# Patient Record
Sex: FEMALE | Race: BLACK OR AFRICAN AMERICAN | NOT HISPANIC OR LATINO | URBAN - METROPOLITAN AREA
[De-identification: names, ages, dates, MRNs, and addresses within clinical notes are randomized per-mention and may not be internally consistent; named-entity substitution may affect disease eponyms.]

---

## 2017-02-06 ENCOUNTER — OUTPATIENT (OUTPATIENT)
Dept: OUTPATIENT SERVICES | Facility: HOSPITAL | Age: 67
LOS: 1 days | End: 2017-02-06
Payer: COMMERCIAL

## 2017-02-06 DIAGNOSIS — Z01.810 ENCOUNTER FOR PREPROCEDURAL CARDIOVASCULAR EXAMINATION: ICD-10-CM

## 2017-02-06 PROCEDURE — 78452 HT MUSCLE IMAGE SPECT MULT: CPT | Mod: 26

## 2017-02-06 PROCEDURE — 78452 HT MUSCLE IMAGE SPECT MULT: CPT

## 2017-02-06 PROCEDURE — 93017 CV STRESS TEST TRACING ONLY: CPT

## 2017-02-06 PROCEDURE — A9500: CPT

## 2017-02-06 PROCEDURE — 93018 CV STRESS TEST I&R ONLY: CPT

## 2017-02-06 PROCEDURE — 93016 CV STRESS TEST SUPVJ ONLY: CPT

## 2017-02-07 DIAGNOSIS — R07.9 CHEST PAIN, UNSPECIFIED: ICD-10-CM

## 2017-04-27 ENCOUNTER — RESULT REVIEW (OUTPATIENT)
Age: 67
End: 2017-04-27

## 2017-12-26 ENCOUNTER — APPOINTMENT (OUTPATIENT)
Dept: PULMONOLOGY | Facility: CLINIC | Age: 67
End: 2017-12-26

## 2018-07-31 ENCOUNTER — RESULT REVIEW (OUTPATIENT)
Age: 68
End: 2018-07-31

## 2018-08-28 ENCOUNTER — RESULT REVIEW (OUTPATIENT)
Age: 68
End: 2018-08-28

## 2019-02-04 ENCOUNTER — RESULT REVIEW (OUTPATIENT)
Age: 69
End: 2019-02-04

## 2019-09-30 ENCOUNTER — RESULT REVIEW (OUTPATIENT)
Age: 69
End: 2019-09-30

## 2019-11-20 ENCOUNTER — OUTPATIENT (OUTPATIENT)
Dept: OUTPATIENT SERVICES | Facility: HOSPITAL | Age: 69
LOS: 1 days | End: 2019-11-20
Payer: COMMERCIAL

## 2019-11-20 DIAGNOSIS — I35.0 NONRHEUMATIC AORTIC (VALVE) STENOSIS: ICD-10-CM

## 2019-11-20 DIAGNOSIS — Z01.810 ENCOUNTER FOR PREPROCEDURAL CARDIOVASCULAR EXAMINATION: ICD-10-CM

## 2019-11-20 DIAGNOSIS — R06.09 OTHER FORMS OF DYSPNEA: ICD-10-CM

## 2019-11-20 PROCEDURE — 78452 HT MUSCLE IMAGE SPECT MULT: CPT | Mod: 26

## 2019-11-20 PROCEDURE — 78452 HT MUSCLE IMAGE SPECT MULT: CPT

## 2019-11-20 PROCEDURE — 93017 CV STRESS TEST TRACING ONLY: CPT

## 2019-11-20 PROCEDURE — A9500: CPT

## 2019-11-20 PROCEDURE — 93016 CV STRESS TEST SUPVJ ONLY: CPT

## 2019-11-20 PROCEDURE — 93018 CV STRESS TEST I&R ONLY: CPT

## 2020-01-21 ENCOUNTER — OUTPATIENT (OUTPATIENT)
Dept: OUTPATIENT SERVICES | Facility: HOSPITAL | Age: 70
LOS: 1 days | End: 2020-01-21
Payer: COMMERCIAL

## 2020-01-21 VITALS
SYSTOLIC BLOOD PRESSURE: 118 MMHG | WEIGHT: 212.97 LBS | OXYGEN SATURATION: 99 % | RESPIRATION RATE: 16 BRPM | HEIGHT: 65 IN | TEMPERATURE: 98 F | DIASTOLIC BLOOD PRESSURE: 68 MMHG | HEART RATE: 77 BPM

## 2020-01-21 DIAGNOSIS — I25.10 ATHEROSCLEROTIC HEART DISEASE OF NATIVE CORONARY ARTERY WITHOUT ANGINA PECTORIS: ICD-10-CM

## 2020-01-21 DIAGNOSIS — Z87.898 PERSONAL HISTORY OF OTHER SPECIFIED CONDITIONS: ICD-10-CM

## 2020-01-21 DIAGNOSIS — R42 DIZZINESS AND GIDDINESS: ICD-10-CM

## 2020-01-21 DIAGNOSIS — N95.0 POSTMENOPAUSAL BLEEDING: ICD-10-CM

## 2020-01-21 DIAGNOSIS — Z95.1 PRESENCE OF AORTOCORONARY BYPASS GRAFT: Chronic | ICD-10-CM

## 2020-01-21 DIAGNOSIS — E11.9 TYPE 2 DIABETES MELLITUS WITHOUT COMPLICATIONS: ICD-10-CM

## 2020-01-21 DIAGNOSIS — Z91.013 ALLERGY TO SEAFOOD: ICD-10-CM

## 2020-01-21 DIAGNOSIS — Z87.81 PERSONAL HISTORY OF (HEALED) TRAUMATIC FRACTURE: Chronic | ICD-10-CM

## 2020-01-21 LAB
ANION GAP SERPL CALC-SCNC: 17 MMO/L — HIGH (ref 7–14)
BUN SERPL-MCNC: 21 MG/DL — SIGNIFICANT CHANGE UP (ref 7–23)
CALCIUM SERPL-MCNC: 10.3 MG/DL — SIGNIFICANT CHANGE UP (ref 8.4–10.5)
CHLORIDE SERPL-SCNC: 95 MMOL/L — LOW (ref 98–107)
CO2 SERPL-SCNC: 26 MMOL/L — SIGNIFICANT CHANGE UP (ref 22–31)
CREAT SERPL-MCNC: 0.9 MG/DL — SIGNIFICANT CHANGE UP (ref 0.5–1.3)
GLUCOSE SERPL-MCNC: 137 MG/DL — HIGH (ref 70–99)
HBA1C BLD-MCNC: 7.3 % — HIGH (ref 4–5.6)
HCT VFR BLD CALC: 38.8 % — SIGNIFICANT CHANGE UP (ref 34.5–45)
HGB BLD-MCNC: 12.3 G/DL — SIGNIFICANT CHANGE UP (ref 11.5–15.5)
MCHC RBC-ENTMCNC: 29.4 PG — SIGNIFICANT CHANGE UP (ref 27–34)
MCHC RBC-ENTMCNC: 31.7 % — LOW (ref 32–36)
MCV RBC AUTO: 92.8 FL — SIGNIFICANT CHANGE UP (ref 80–100)
NRBC # FLD: 0 K/UL — SIGNIFICANT CHANGE UP (ref 0–0)
PLATELET # BLD AUTO: 334 K/UL — SIGNIFICANT CHANGE UP (ref 150–400)
PMV BLD: 9.7 FL — SIGNIFICANT CHANGE UP (ref 7–13)
POTASSIUM SERPL-MCNC: 3.5 MMOL/L — SIGNIFICANT CHANGE UP (ref 3.5–5.3)
POTASSIUM SERPL-SCNC: 3.5 MMOL/L — SIGNIFICANT CHANGE UP (ref 3.5–5.3)
RBC # BLD: 4.18 M/UL — SIGNIFICANT CHANGE UP (ref 3.8–5.2)
RBC # FLD: 14.2 % — SIGNIFICANT CHANGE UP (ref 10.3–14.5)
SODIUM SERPL-SCNC: 138 MMOL/L — SIGNIFICANT CHANGE UP (ref 135–145)
WBC # BLD: 5.46 K/UL — SIGNIFICANT CHANGE UP (ref 3.8–10.5)
WBC # FLD AUTO: 5.46 K/UL — SIGNIFICANT CHANGE UP (ref 3.8–10.5)

## 2020-01-21 PROCEDURE — 93010 ELECTROCARDIOGRAM REPORT: CPT

## 2020-01-21 RX ORDER — SODIUM CHLORIDE 9 MG/ML
1000 INJECTION, SOLUTION INTRAVENOUS
Refills: 0 | Status: DISCONTINUED | OUTPATIENT
Start: 2020-01-31 | End: 2020-02-17

## 2020-01-21 RX ORDER — UBIDECARENONE 100 MG
1 CAPSULE ORAL
Qty: 0 | Refills: 0 | DISCHARGE

## 2020-01-21 NOTE — H&P PST ADULT - NSICDXPASTMEDICALHX_GEN_ALL_CORE_FT
PAST MEDICAL HISTORY:  Breast Ca (L)    Colon Cancer     Coronary artery disease     HTN (Hypertension)     Hyperlipidemia PAST MEDICAL HISTORY:  Breast Ca (L)    Colon Cancer     Coronary artery disease     Diabetes mellitus     HTN (Hypertension)     Hyperlipidemia     Renal insufficiency per pt    Vertigo

## 2020-01-21 NOTE — H&P PST ADULT - NEUROLOGICAL COMMENTS
Pt  with hx of vertigo dx several ago.  Pt reports episodes of  dizziness started  Dec 2019. Started on Meclizine without improvement, No longer taking.  Per pt, she feels the dizziness when changing positions quickly.  Pt to see PMD for re-eval. Pt  with hx of vertigo dx several ago.  Pt reports recent episodes of  dizziness started  Dec 2019. Started on Meclizine without improvement, No longer taking.  Per pt, she feels the dizziness when changing positions quickly.  Pt to see PMD for re-eval.

## 2020-01-21 NOTE — H&P PST ADULT - NSICDXPROBLEM_GEN_ALL_CORE_FT
PROBLEM DIAGNOSES  Problem: Postmenopausal bleeding  Assessment and Plan: Dilation Curettage Hysteroscopy 1/31/20.   CBC BMP Hgba1C EKG  pre-op instructions given and explained.  MASON precautions, OR booking informed    Problem: Shellfish allergy  Assessment and Plan: OR booking informed    Problem: Diabetes mellitus  Assessment and Plan: Pt advised not to take Metformin night pripor to surgery  or  morning of surgery, accuchek on admission    Problem: Episode of dizziness  Assessment and Plan: Pt with recent episdoes of dizziness, past hx of vertigo.  Pt to see PMD for pre-op eval, requsetd on chart    Problem: Coronary artery disease  Assessment and Plan: Most recent Card note , echo and stress reports from 11/2019 requested on chart.  Pt continues on low dose ASA

## 2020-01-21 NOTE — H&P PST ADULT - NSICDXPASTSURGICALHX_GEN_ALL_CORE_FT
PAST SURGICAL HISTORY:  H/O forearm fracture 1995 (R)    History of Colon Resection 2001    S/P Breast Lumpectomy (L) 1999    S/P CABG x 2 2010

## 2020-01-21 NOTE — H&P PST ADULT - GENITOURINARY COMMENTS
hx of intermittent vaginal bleeding which started 2019.  Dx with uterine polyp on imaging.  Scheduled for Dilation Curettage Hysteroscopy 1/31/20.

## 2020-01-21 NOTE — H&P PST ADULT - NEGATIVE NEUROLOGICAL SYMPTOMS
no confusion/no weakness/no generalized seizures/no headache/no facial palsy/no hemiparesis/no syncope/no transient paralysis/no loss of consciousness/no paresthesias/no loss of sensation/no difficulty walking/no focal seizures/no tremors

## 2020-01-21 NOTE — H&P PST ADULT - ASSESSMENT
70 y/o female with hx of intermittent vaginal bleeding which started 2019.  Dx with uterine polyp on imaging.  Scheduled for Dilation Curettage Hysteroscopy 1/31/20.

## 2020-01-30 ENCOUNTER — TRANSCRIPTION ENCOUNTER (OUTPATIENT)
Age: 70
End: 2020-01-30

## 2020-01-30 NOTE — ASU PATIENT PROFILE, ADULT - PSH
H/O forearm fracture  1995 (R)  History of Colon Resection  2001  S/P Breast Lumpectomy  (L) 1999  S/P CABG x 2  2010

## 2020-01-30 NOTE — ASU PATIENT PROFILE, ADULT - HARM RISK FACTORS
[facility]– [place] 2[place]         Critical Care Progress Note    Patient: Domo Espinal Date of Service: 5/5/2019   male, 81 year old  Admit Date: 5/3/2019   Attending: Bobby Parsons MD      Patient Description:  81 year old year old male admitted with SDH BLEED    Principal Problem:    SDH (subdural hematoma) (CMS/HCC)  Active Problems:    SAH (subarachnoid hemorrhage) (CMS/HCC)    CAD (coronary artery disease)    HTN (hypertension)    Atrial fibrillation (CMS/HCC)  Resolved Problems:    * No resolved hospital problems. *       ICU Diagnosis  A.fib on X[place]to, fall, SAH/SDH    24 HOUR Summary:  No change in neuro exam: intermittent R UE weakness/stiffness. Plan repeat CT brain w/o contrast tomorrow AM. Neurosurgery on the case. PT/OT on the case. Speech therapy and inpt rehab consults pending. Continue to hold anticoagolation. Pt has suspected MARGARETH, did not tolerate CPAP, required O2 4 lpm while asleep but on RA during the day. CXR: nl.      PHYSICAL EXAM    Vital 24 Hour Range Most Recent Value   Temperature Temp  Min: 97.6 °F (36.4 °C)  Max: 98.6 °F (37 °C) 97.6 °F (36.4 °C)   Pulse Pulse  Min: 51  Max: 77 64   Respiratory Resp  Min: 9  Max: 30 18   Blood Pressure BP  Min: 109/57  Max: 157/65 123/78   Pulse Oximetry SpO2  Min: 85 %  Max: 100 % 96 %   Art. BP No data recorded     O2 O2 Flow Rate (L/min)  Avg: 3.6 L/min  Min: 2 L/min   Min taken time: 05/05/19 1400  Max: 5 L/min   Max taken time: 05/05/19 0140       Vital Most Recent Value First Value   Weight 87.3 kg Weight: 88 kg   Height 5' 9\" (175.3 cm) Height: 5' 9\" (175.3 cm)   BMI 28.42 N/A     Examination:  Appearence: 81 year old year old male who appears   Neurologic:  Alert and oriented x 3  Lungs:  Clear to auscultation bilaterally  Heart:  Regular rate and rhythm and S1, S2 present  Abdomen: Soft  Extremities: Edema absent      Plan:  Close neuro monitoring  CT brain w/o contrast in AM  [facility] for seizure prophylaxis  PT/OT  Inpatient  rehab consult  Speech consult  Hold anticoagulation  Neurosurgery on the case    I have personally examined the patient and reviewed all pertinent data. Critical care time was 35 minutes.  This does not include time spent in procedures and teaching.  The patient's cares and treatment plan were discussed with Patient/Family and Nursing.    Jaqui Hamilton MD  5/5/2019  2:33 PM           no

## 2020-01-31 ENCOUNTER — RESULT REVIEW (OUTPATIENT)
Age: 70
End: 2020-01-31

## 2020-01-31 ENCOUNTER — OUTPATIENT (OUTPATIENT)
Dept: OUTPATIENT SERVICES | Facility: HOSPITAL | Age: 70
LOS: 1 days | Discharge: HOME | End: 2020-01-31
Payer: COMMERCIAL

## 2020-01-31 VITALS
DIASTOLIC BLOOD PRESSURE: 88 MMHG | TEMPERATURE: 98 F | OXYGEN SATURATION: 95 % | HEART RATE: 63 BPM | SYSTOLIC BLOOD PRESSURE: 128 MMHG | WEIGHT: 212.97 LBS | HEIGHT: 65 IN | RESPIRATION RATE: 16 BRPM

## 2020-01-31 VITALS
OXYGEN SATURATION: 99 % | RESPIRATION RATE: 16 BRPM | HEART RATE: 82 BPM | SYSTOLIC BLOOD PRESSURE: 118 MMHG | DIASTOLIC BLOOD PRESSURE: 65 MMHG

## 2020-01-31 DIAGNOSIS — Z87.81 PERSONAL HISTORY OF (HEALED) TRAUMATIC FRACTURE: Chronic | ICD-10-CM

## 2020-01-31 DIAGNOSIS — N95.0 POSTMENOPAUSAL BLEEDING: ICD-10-CM

## 2020-01-31 DIAGNOSIS — Z95.1 PRESENCE OF AORTOCORONARY BYPASS GRAFT: Chronic | ICD-10-CM

## 2020-01-31 LAB — GLUCOSE BLDC GLUCOMTR-MCNC: 156 MG/DL — HIGH (ref 70–99)

## 2020-01-31 PROCEDURE — 88305 TISSUE EXAM BY PATHOLOGIST: CPT | Mod: 26

## 2020-01-31 RX ORDER — METOPROLOL TARTRATE 50 MG
1 TABLET ORAL
Qty: 0 | Refills: 0 | DISCHARGE

## 2020-01-31 RX ORDER — ASCORBIC ACID 60 MG
1 TABLET,CHEWABLE ORAL
Qty: 0 | Refills: 0 | DISCHARGE

## 2020-01-31 RX ORDER — NIFEDIPINE 30 MG
1 TABLET, EXTENDED RELEASE 24 HR ORAL
Qty: 0 | Refills: 0 | DISCHARGE

## 2020-01-31 RX ORDER — ASPIRIN/CALCIUM CARB/MAGNESIUM 324 MG
1 TABLET ORAL
Qty: 0 | Refills: 0 | DISCHARGE

## 2020-01-31 RX ORDER — METFORMIN HYDROCHLORIDE 850 MG/1
1 TABLET ORAL
Qty: 0 | Refills: 0 | DISCHARGE

## 2020-01-31 RX ORDER — LISINOPRIL/HYDROCHLOROTHIAZIDE 10-12.5 MG
1 TABLET ORAL
Qty: 0 | Refills: 0 | DISCHARGE

## 2020-01-31 RX ORDER — SODIUM CHLORIDE 9 MG/ML
1000 INJECTION, SOLUTION INTRAVENOUS
Refills: 0 | Status: DISCONTINUED | OUTPATIENT
Start: 2020-01-31 | End: 2020-02-17

## 2020-01-31 RX ORDER — EZETIMIBE 10 MG/1
1 TABLET ORAL
Qty: 0 | Refills: 0 | DISCHARGE

## 2020-01-31 RX ORDER — UBIDECARENONE 100 MG
1 CAPSULE ORAL
Qty: 0 | Refills: 0 | DISCHARGE

## 2020-01-31 NOTE — ASU DISCHARGE PLAN (ADULT/PEDIATRIC) - FOLLOW UP APPOINTMENTS
911 or go to the nearest Emergency Room may also call Recovery Room (PACU) 24/7 @ [phone]/[facility]:

## 2020-01-31 NOTE — ASU DISCHARGE PLAN (ADULT/PEDIATRIC) - MEDICATION INSTRUCTIONS
You received IV Tylenol for pain management at 10:25 AM. Please DO NOT take any Tylenol (Acetaminophen) containing products, such as Vicodin, Percocet, Excedrin, and cold medications for the next 6 hours (until 4:25 PM). DO NOT TAKE MORE THAN 3000 MG OF TYLENOL in a 24 hour period.

## 2020-01-31 NOTE — ASU DISCHARGE PLAN (ADULT/PEDIATRIC) - CARE PROVIDER_API CALL
Danielle Espino)  Obstetrics and Gynecology  [address] [place] [id number]  Phone: [phone]  Fax: [phone]  Follow Up Time:

## 2020-01-31 NOTE — BRIEF OPERATIVE NOTE - OPERATION/FINDINGS
2-3 cm fibroid protruding into fundus, 1 cm polyp at left uterine cavity, otherwise normal uterus and b/l ostia

## 2020-01-31 NOTE — BRIEF OPERATIVE NOTE - NSICDXBRIEFPROCEDURE_GEN_ALL_CORE_FT
PROCEDURES:  Polypectomy, uterus 31-Jan-2020 10:48:00  Walter Orlando  Diagnostic hysteroscopy 31-Jan-2020 10:47:53  Walter Orlando  Dilation and curettage, uterus 31-Jan-2020 10:47:46  Walter Orlando

## 2020-01-31 NOTE — ASU DISCHARGE PLAN (ADULT/PEDIATRIC) - CALL YOUR DOCTOR IF YOU HAVE ANY OF THE FOLLOWING:
Inability to tolerate liquids or foods/Bleeding that does not stop/Nausea and vomiting that does not stop/Pain not relieved by Medications/Fever greater than (need to indicate Fahrenheit or Celsius) Nausea and vomiting that does not stop/Inability to tolerate liquids or foods/Unable to urinate/Pain not relieved by Medications/Fever greater than (need to indicate Fahrenheit or Celsius)/Wound/Surgical Site with redness, or foul smelling discharge or pus/Bleeding that does not stop

## 2020-02-06 LAB — SURGICAL PATHOLOGY STUDY: SIGNIFICANT CHANGE UP

## 2021-02-16 ENCOUNTER — RESULT REVIEW (OUTPATIENT)
Age: 71
End: 2021-02-16

## 2021-06-17 ENCOUNTER — EMERGENCY (EMERGENCY)
Facility: HOSPITAL | Age: 71
LOS: 1 days | Discharge: HOME | End: 2021-06-17
Attending: EMERGENCY MEDICINE
Payer: COMMERCIAL

## 2021-06-17 VITALS
SYSTOLIC BLOOD PRESSURE: 162 MMHG | RESPIRATION RATE: 16 BRPM | HEART RATE: 79 BPM | DIASTOLIC BLOOD PRESSURE: 90 MMHG | OXYGEN SATURATION: 97 % | HEIGHT: 65 IN | TEMPERATURE: 98 F

## 2021-06-17 DIAGNOSIS — Z95.1 PRESENCE OF AORTOCORONARY BYPASS GRAFT: Chronic | ICD-10-CM

## 2021-06-17 DIAGNOSIS — Z87.81 PERSONAL HISTORY OF (HEALED) TRAUMATIC FRACTURE: Chronic | ICD-10-CM

## 2021-06-17 PROBLEM — R42 DIZZINESS AND GIDDINESS: Chronic | Status: ACTIVE | Noted: 2020-01-21

## 2021-06-17 PROBLEM — I25.10 ATHEROSCLEROTIC HEART DISEASE OF NATIVE CORONARY ARTERY WITHOUT ANGINA PECTORIS: Chronic | Status: ACTIVE | Noted: 2020-01-21

## 2021-06-17 PROBLEM — N28.9 DISORDER OF KIDNEY AND URETER, UNSPECIFIED: Chronic | Status: ACTIVE | Noted: 2020-01-21

## 2021-06-17 PROBLEM — E11.9 TYPE 2 DIABETES MELLITUS WITHOUT COMPLICATIONS: Chronic | Status: ACTIVE | Noted: 2020-01-21

## 2021-06-17 LAB
ALBUMIN SERPL ELPH-MCNC: 4.1 G/DL — SIGNIFICANT CHANGE UP (ref 3.3–5.2)
ALP SERPL-CCNC: 56 U/L — SIGNIFICANT CHANGE UP (ref 40–120)
ALT FLD-CCNC: 11 U/L — SIGNIFICANT CHANGE UP
ANION GAP SERPL CALC-SCNC: 14 MMOL/L — SIGNIFICANT CHANGE UP (ref 5–17)
AST SERPL-CCNC: 23 U/L — SIGNIFICANT CHANGE UP
BASE EXCESS BLDV CALC-SCNC: 4.2 MMOL/L — HIGH (ref -2–3)
BASOPHILS # BLD AUTO: 0.07 K/UL — SIGNIFICANT CHANGE UP (ref 0–0.2)
BASOPHILS NFR BLD AUTO: 0.9 % — SIGNIFICANT CHANGE UP (ref 0–2)
BILIRUB SERPL-MCNC: 0.4 MG/DL — SIGNIFICANT CHANGE UP (ref 0.4–2)
BUN SERPL-MCNC: 16.6 MG/DL — SIGNIFICANT CHANGE UP (ref 8–20)
CA-I SERPL-SCNC: 1.17 MMOL/L — SIGNIFICANT CHANGE UP (ref 1.15–1.33)
CALCIUM SERPL-MCNC: 9.4 MG/DL — SIGNIFICANT CHANGE UP (ref 8.6–10.2)
CHLORIDE BLDV-SCNC: 101 MMOL/L — SIGNIFICANT CHANGE UP (ref 98–107)
CHLORIDE SERPL-SCNC: 99 MMOL/L — SIGNIFICANT CHANGE UP (ref 98–107)
CO2 SERPL-SCNC: 25 MMOL/L — SIGNIFICANT CHANGE UP (ref 22–29)
CREAT SERPL-MCNC: 0.89 MG/DL — SIGNIFICANT CHANGE UP (ref 0.5–1.3)
EOSINOPHIL # BLD AUTO: 0 K/UL — SIGNIFICANT CHANGE UP (ref 0–0.5)
EOSINOPHIL NFR BLD AUTO: 0 % — SIGNIFICANT CHANGE UP (ref 0–6)
GAS PNL BLDV: 137 MMOL/L — SIGNIFICANT CHANGE UP (ref 136–145)
GAS PNL BLDV: SIGNIFICANT CHANGE UP
GIANT PLATELETS BLD QL SMEAR: PRESENT — SIGNIFICANT CHANGE UP
GLUCOSE BLDV-MCNC: 285 MG/DL — HIGH (ref 70–99)
GLUCOSE SERPL-MCNC: 264 MG/DL — HIGH (ref 70–99)
HCO3 BLDV-SCNC: 28 MMOL/L — SIGNIFICANT CHANGE UP (ref 22–29)
HCT VFR BLD CALC: 39.7 % — SIGNIFICANT CHANGE UP (ref 34.5–45)
HCT VFR BLDA CALC: 41 % — SIGNIFICANT CHANGE UP (ref 34–46)
HGB BLD CALC-MCNC: 13.7 G/DL — SIGNIFICANT CHANGE UP (ref 11.7–16.1)
HGB BLD-MCNC: 13.2 G/DL — SIGNIFICANT CHANGE UP (ref 11.5–15.5)
LACTATE BLDV-MCNC: 2.1 MMOL/L — HIGH (ref 0.5–2)
LACTATE BLDV-MCNC: 2.3 MMOL/L — HIGH (ref 0.5–2)
LIDOCAIN IGE QN: 23 U/L — SIGNIFICANT CHANGE UP (ref 22–51)
LYMPHOCYTES # BLD AUTO: 1.19 K/UL — SIGNIFICANT CHANGE UP (ref 1–3.3)
LYMPHOCYTES # BLD AUTO: 14.8 % — SIGNIFICANT CHANGE UP (ref 13–44)
MACROCYTES BLD QL: SLIGHT — SIGNIFICANT CHANGE UP
MAGNESIUM SERPL-MCNC: 1.4 MG/DL — LOW (ref 1.6–2.6)
MANUAL SMEAR VERIFICATION: SIGNIFICANT CHANGE UP
MCHC RBC-ENTMCNC: 30 PG — SIGNIFICANT CHANGE UP (ref 27–34)
MCHC RBC-ENTMCNC: 33.2 GM/DL — SIGNIFICANT CHANGE UP (ref 32–36)
MCV RBC AUTO: 90.2 FL — SIGNIFICANT CHANGE UP (ref 80–100)
MONOCYTES # BLD AUTO: 0.56 K/UL — SIGNIFICANT CHANGE UP (ref 0–0.9)
MONOCYTES NFR BLD AUTO: 6.9 % — SIGNIFICANT CHANGE UP (ref 2–14)
NEUTROPHILS # BLD AUTO: 5.46 K/UL — SIGNIFICANT CHANGE UP (ref 1.8–7.4)
NEUTROPHILS NFR BLD AUTO: 67.8 % — SIGNIFICANT CHANGE UP (ref 43–77)
OVALOCYTES BLD QL SMEAR: SLIGHT — SIGNIFICANT CHANGE UP
PCO2 BLDV: 43 MMHG — HIGH (ref 39–42)
PH BLDV: 7.43 — SIGNIFICANT CHANGE UP (ref 7.32–7.43)
PLAT MORPH BLD: NORMAL — SIGNIFICANT CHANGE UP
PLATELET # BLD AUTO: 336 K/UL — SIGNIFICANT CHANGE UP (ref 150–400)
PO2 BLDV: 102 MMHG — HIGH (ref 25–45)
POTASSIUM BLDV-SCNC: 3.7 MMOL/L — SIGNIFICANT CHANGE UP (ref 3.5–5.1)
POTASSIUM SERPL-MCNC: 4.2 MMOL/L — SIGNIFICANT CHANGE UP (ref 3.5–5.3)
POTASSIUM SERPL-SCNC: 4.2 MMOL/L — SIGNIFICANT CHANGE UP (ref 3.5–5.3)
PROT SERPL-MCNC: 8.1 G/DL — SIGNIFICANT CHANGE UP (ref 6.6–8.7)
RBC # BLD: 4.4 M/UL — SIGNIFICANT CHANGE UP (ref 3.8–5.2)
RBC # FLD: 13.7 % — SIGNIFICANT CHANGE UP (ref 10.3–14.5)
RBC BLD AUTO: ABNORMAL
SAO2 % BLDV: 99.8 % — SIGNIFICANT CHANGE UP
SODIUM SERPL-SCNC: 138 MMOL/L — SIGNIFICANT CHANGE UP (ref 135–145)
VARIANT LYMPHS # BLD: 9.6 % — HIGH (ref 0–6)
WBC # BLD: 8.06 K/UL — SIGNIFICANT CHANGE UP (ref 3.8–10.5)
WBC # FLD AUTO: 8.06 K/UL — SIGNIFICANT CHANGE UP (ref 3.8–10.5)

## 2021-06-17 PROCEDURE — 74177 CT ABD & PELVIS W/CONTRAST: CPT

## 2021-06-17 PROCEDURE — 82803 BLOOD GASES ANY COMBINATION: CPT

## 2021-06-17 PROCEDURE — 84132 ASSAY OF SERUM POTASSIUM: CPT

## 2021-06-17 PROCEDURE — 96374 THER/PROPH/DIAG INJ IV PUSH: CPT | Mod: XU

## 2021-06-17 PROCEDURE — 82435 ASSAY OF BLOOD CHLORIDE: CPT

## 2021-06-17 PROCEDURE — 80053 COMPREHEN METABOLIC PANEL: CPT

## 2021-06-17 PROCEDURE — 82962 GLUCOSE BLOOD TEST: CPT

## 2021-06-17 PROCEDURE — 71045 X-RAY EXAM CHEST 1 VIEW: CPT

## 2021-06-17 PROCEDURE — 99284 EMERGENCY DEPT VISIT MOD MDM: CPT | Mod: 25

## 2021-06-17 PROCEDURE — 36415 COLL VENOUS BLD VENIPUNCTURE: CPT

## 2021-06-17 PROCEDURE — 93005 ELECTROCARDIOGRAM TRACING: CPT

## 2021-06-17 PROCEDURE — 82947 ASSAY GLUCOSE BLOOD QUANT: CPT

## 2021-06-17 PROCEDURE — 85025 COMPLETE CBC W/AUTO DIFF WBC: CPT

## 2021-06-17 PROCEDURE — 83690 ASSAY OF LIPASE: CPT

## 2021-06-17 PROCEDURE — 85014 HEMATOCRIT: CPT

## 2021-06-17 PROCEDURE — 74177 CT ABD & PELVIS W/CONTRAST: CPT | Mod: 26,MA

## 2021-06-17 PROCEDURE — 71045 X-RAY EXAM CHEST 1 VIEW: CPT | Mod: 26

## 2021-06-17 PROCEDURE — 82330 ASSAY OF CALCIUM: CPT

## 2021-06-17 PROCEDURE — 84295 ASSAY OF SERUM SODIUM: CPT

## 2021-06-17 PROCEDURE — 83605 ASSAY OF LACTIC ACID: CPT

## 2021-06-17 PROCEDURE — 85018 HEMOGLOBIN: CPT

## 2021-06-17 PROCEDURE — 83735 ASSAY OF MAGNESIUM: CPT

## 2021-06-17 PROCEDURE — 93010 ELECTROCARDIOGRAM REPORT: CPT

## 2021-06-17 PROCEDURE — 99284 EMERGENCY DEPT VISIT MOD MDM: CPT

## 2021-06-17 RX ORDER — SODIUM CHLORIDE 9 MG/ML
1000 INJECTION, SOLUTION INTRAVENOUS ONCE
Refills: 0 | Status: COMPLETED | OUTPATIENT
Start: 2021-06-17 | End: 2021-06-17

## 2021-06-17 RX ORDER — MORPHINE SULFATE 50 MG/1
4 CAPSULE, EXTENDED RELEASE ORAL ONCE
Refills: 0 | Status: DISCONTINUED | OUTPATIENT
Start: 2021-06-17 | End: 2021-06-17

## 2021-06-17 RX ORDER — IOHEXOL 300 MG/ML
30 INJECTION, SOLUTION INTRAVENOUS ONCE
Refills: 0 | Status: COMPLETED | OUTPATIENT
Start: 2021-06-17 | End: 2021-06-17

## 2021-06-17 RX ADMIN — IOHEXOL 30 MILLILITER(S): 300 INJECTION, SOLUTION INTRAVENOUS at 01:53

## 2021-06-17 RX ADMIN — MORPHINE SULFATE 4 MILLIGRAM(S): 50 CAPSULE, EXTENDED RELEASE ORAL at 04:19

## 2021-06-17 RX ADMIN — SODIUM CHLORIDE 1000 MILLILITER(S): 9 INJECTION, SOLUTION INTRAVENOUS at 01:15

## 2021-06-17 NOTE — ED ADULT NURSE NOTE - NSIMPLEMENTINTERV_GEN_ALL_ED
Implemented All Universal Safety Interventions:  [place] to call system. Call bell, personal items and telephone within reach. Instruct patient to call for assistance. Room bathroom lighting operational. Non-slip footwear when patient is off stretcher. Physically safe environment: no spills, clutter or unnecessary equipment. Stretcher in lowest position, wheels locked, appropriate side rails in place.

## 2021-06-17 NOTE — ED PROVIDER NOTE - PMH
Breast Ca  (L)  Colon Cancer    Coronary artery disease    Diabetes mellitus    HTN (Hypertension)    Hyperlipidemia    Renal insufficiency  per pt  Vertigo

## 2021-06-17 NOTE — ED ADULT TRIAGE NOTE - CHIEF COMPLAINT QUOTE
Pt. BIBA for worsening epigastric pain over the last 7 hours. Pt. states shes been having diarrhea also. Pt. denies N/V/chest pain/ SOB.  in triage.

## 2021-06-17 NOTE — ED PROVIDER NOTE - CLINICAL SUMMARY MEDICAL DECISION MAKING FREE TEXT BOX
Acute abdominal pain with nausea, vomiting, diarrhea, CT with nonspecific small bowel enteritis. Noted minimal elevation of lactate but no signs of ischemia, abdominal exam benign and pain free on reassessment. Able to tolerate PO without recurrent pain. Given lack of fever or leukocytosis will hold antibiotics. Pt will follow up with her GI, return precautions provided.

## 2021-06-17 NOTE — ED PROVIDER NOTE - OBJECTIVE STATEMENT
71yof w/ distant hx of colon CA s/p resection p/w abdominal pain. Acute onset of diffuse abdominal pain with nausea, vomiting and diarrhea this evening without any inciting event. Pain localized mostly across the mid abdomen. Non-bilious vomiting, watery diarrhea. Pain has been starting to improve without intervention but any PO intake causes recurrent pain.

## 2021-06-17 NOTE — ED PROVIDER NOTE - PATIENT PORTAL LINK FT
You can access the FollowMyHealth Patient Portal offered by [facility] by registering at the following website: [email or web address]. By joining [employer]’s FollowMyHealth portal, you will also be able to view your health information using other applications (apps) compatible with our system.

## 2021-06-17 NOTE — ED ADULT NURSE NOTE - OBJECTIVE STATEMENT
Pt is alert and oriented. Pt states that she developed abdominal pain at 7pm. Pt states that the pain in her abdomen is generalized and a 4/10 and diarrhea. Pt denies sob, chest pain, nausea, vomiting and dizziness. Pt resp are even and unlabored, skin color lenore for race. pt educated on plan of care, pt able to successfully teach back plan of care to RN, RN will continue to reeducate pt during hospital stay.

## 2021-09-08 ENCOUNTER — OUTPATIENT (OUTPATIENT)
Dept: OUTPATIENT SERVICES | Facility: HOSPITAL | Age: 71
LOS: 1 days | End: 2021-09-08
Payer: COMMERCIAL

## 2021-09-08 DIAGNOSIS — I34.0 NONRHEUMATIC MITRAL (VALVE) INSUFFICIENCY: ICD-10-CM

## 2021-09-08 DIAGNOSIS — Z95.1 PRESENCE OF AORTOCORONARY BYPASS GRAFT: Chronic | ICD-10-CM

## 2021-09-08 DIAGNOSIS — Z87.81 PERSONAL HISTORY OF (HEALED) TRAUMATIC FRACTURE: Chronic | ICD-10-CM

## 2021-09-08 DIAGNOSIS — R06.00 DYSPNEA, UNSPECIFIED: ICD-10-CM

## 2021-09-08 PROCEDURE — 93306 TTE W/DOPPLER COMPLETE: CPT | Mod: 26

## 2021-09-08 PROCEDURE — 93306 TTE W/DOPPLER COMPLETE: CPT

## 2021-09-13 ENCOUNTER — OUTPATIENT (OUTPATIENT)
Dept: OUTPATIENT SERVICES | Facility: HOSPITAL | Age: 71
LOS: 1 days | End: 2021-09-13
Payer: COMMERCIAL

## 2021-09-13 DIAGNOSIS — Z87.81 PERSONAL HISTORY OF (HEALED) TRAUMATIC FRACTURE: Chronic | ICD-10-CM

## 2021-09-13 DIAGNOSIS — R06.00 DYSPNEA, UNSPECIFIED: ICD-10-CM

## 2021-09-13 DIAGNOSIS — I34.0 NONRHEUMATIC MITRAL (VALVE) INSUFFICIENCY: ICD-10-CM

## 2021-09-13 DIAGNOSIS — Z95.1 PRESENCE OF AORTOCORONARY BYPASS GRAFT: Chronic | ICD-10-CM

## 2021-09-13 PROCEDURE — 93017 CV STRESS TEST TRACING ONLY: CPT

## 2021-09-13 PROCEDURE — 78452 HT MUSCLE IMAGE SPECT MULT: CPT

## 2021-09-13 PROCEDURE — A9500: CPT

## 2021-09-13 PROCEDURE — 93018 CV STRESS TEST I&R ONLY: CPT

## 2021-09-13 PROCEDURE — 78452 HT MUSCLE IMAGE SPECT MULT: CPT | Mod: 26

## 2021-09-13 PROCEDURE — 93016 CV STRESS TEST SUPVJ ONLY: CPT

## 2022-06-16 NOTE — ED PROVIDER NOTE - CONSTITUTIONAL, MLM
normal... Well appearing, awake, alert, oriented to person, place, time/situation and in no apparent distress. No

## 2022-12-03 ENCOUNTER — EMERGENCY (EMERGENCY)
Facility: HOSPITAL | Age: 72
LOS: 1 days | Discharge: HOME | End: 2022-12-03
Attending: EMERGENCY MEDICINE | Admitting: EMERGENCY MEDICINE
Payer: COMMERCIAL

## 2022-12-03 VITALS
RESPIRATION RATE: 15 BRPM | DIASTOLIC BLOOD PRESSURE: 75 MMHG | HEART RATE: 70 BPM | TEMPERATURE: 98 F | OXYGEN SATURATION: 99 % | SYSTOLIC BLOOD PRESSURE: 149 MMHG

## 2022-12-03 VITALS
RESPIRATION RATE: 15 BRPM | DIASTOLIC BLOOD PRESSURE: 78 MMHG | WEIGHT: 199.96 LBS | SYSTOLIC BLOOD PRESSURE: 161 MMHG | TEMPERATURE: 98 F | HEIGHT: 65 IN | HEART RATE: 75 BPM | OXYGEN SATURATION: 98 %

## 2022-12-03 DIAGNOSIS — Z87.81 PERSONAL HISTORY OF (HEALED) TRAUMATIC FRACTURE: Chronic | ICD-10-CM

## 2022-12-03 DIAGNOSIS — Z95.1 PRESENCE OF AORTOCORONARY BYPASS GRAFT: Chronic | ICD-10-CM

## 2022-12-03 PROCEDURE — 99283 EMERGENCY DEPT VISIT LOW MDM: CPT | Mod: 25

## 2022-12-03 PROCEDURE — 73610 X-RAY EXAM OF ANKLE: CPT | Mod: 26,RT,59

## 2022-12-03 PROCEDURE — 73630 X-RAY EXAM OF FOOT: CPT | Mod: 26,RT

## 2022-12-03 PROCEDURE — 29515 APPLICATION SHORT LEG SPLINT: CPT | Mod: RT

## 2022-12-03 PROCEDURE — 73610 X-RAY EXAM OF ANKLE: CPT

## 2022-12-03 PROCEDURE — 99284 EMERGENCY DEPT VISIT MOD MDM: CPT | Mod: 25

## 2022-12-03 PROCEDURE — 93971 EXTREMITY STUDY: CPT | Mod: 26,RT

## 2022-12-03 PROCEDURE — 73590 X-RAY EXAM OF LOWER LEG: CPT

## 2022-12-03 PROCEDURE — 93971 EXTREMITY STUDY: CPT

## 2022-12-03 PROCEDURE — 73630 X-RAY EXAM OF FOOT: CPT

## 2022-12-03 PROCEDURE — 73590 X-RAY EXAM OF LOWER LEG: CPT | Mod: 26,RT,59

## 2022-12-03 RX ORDER — OXYCODONE AND ACETAMINOPHEN 5; 325 MG/1; MG/1
1 TABLET ORAL
Qty: 12 | Refills: 0
Start: 2022-12-03 | End: 2022-12-05

## 2022-12-03 NOTE — ED ADULT NURSE REASSESSMENT NOTE - NS ED NURSE REASSESS COMMENT FT1
Presents to ER with right ankle pain since yesterday.  Outer ankle edematous. No bruising or wounds noted to foot.  Pending XRAYS, medicated with Percocet for pain.  Leg elevated, ice pack applied.

## 2022-12-03 NOTE — ED PROVIDER NOTE - PATIENT PORTAL LINK FT
You can access the FollowMyHealth Patient Portal offered by [facility] by registering at the following website: [email or web address]. By joining [employer]’s FollowMyHealth portal, you will also be able to view your health information using other applications (apps) compatible with our system.

## 2022-12-03 NOTE — ED PROVIDER NOTE - MUSCULOSKELETAL, MLM
Right ankle: Moderate severe swelling noted to the lateral malleolus with diffuse tenderness to palpation no tenderness to palpation overlying the medial malleolus.  No tenderness to palpation overlying the Achilles tendon.  Achilles reflex intact.  No tenderness to palpation to the first through fourth MTPs.  There is some tenderness to palpation overlying the fifth proximal MTP.  Sensation is grossly intact to the foot and ankle positive pedal pulse cap refill less than 2 seconds there is no increased warmth or redness noted to the ankle.  No tenderness palpation to the anterior shin.  There is no tenderness of palpation to the calf no calf swelling noted.

## 2022-12-03 NOTE — ED PROVIDER NOTE - CARE PROVIDER_API CALL
Wander Smith (DPM)  Foot Surgery; Podiatric Medicine; Recon RearfootAnkle Surgery  [address] [place] [address]  Phone: [phone]  Fax: [phone]  Follow Up Time:

## 2022-12-03 NOTE — ED ADULT NURSE NOTE - NSICDXPASTMEDICALHX_GEN_ALL_CORE_FT
PAST MEDICAL HISTORY:  Breast Ca (L)    Colon Cancer     Coronary artery disease     Diabetes mellitus     HTN (Hypertension)     Hyperlipidemia     Renal insufficiency per pt    Vertigo

## 2022-12-03 NOTE — ED ADULT NURSE NOTE - NSIMPLEMENTINTERV_GEN_ALL_ED
Implemented All Fall Risk Interventions:  [place] to call system. Call bell, personal items and telephone within reach. Instruct patient to call for assistance. Room bathroom lighting operational. Non-slip footwear when patient is off stretcher. Physically safe environment: no spills, clutter or unnecessary equipment. Stretcher in lowest position, wheels locked, appropriate side rails in place. Provide visual cue, wrist band, yellow gown, etc. Monitor gait and stability. Monitor for mental status changes and reorient to person, place, and time. Review medications for side effects contributing to fall risk. Reinforce activity limits and safety measures with patient and family.

## 2022-12-03 NOTE — ED PROVIDER NOTE - CLINICAL SUMMARY MEDICAL DECISION MAKING FREE TEXT BOX
Patient is a 72-year-old female who presents emergency room with a chief complaint of ankle pain.  Past medical history of breast cancer status post left breast lumpectomy in , colon cancer status postresection in , CAD status post CABG x2 vessels in , diabetes, hypertension, hyperlipidemia, renal insufficiency, vertigo. Patient reports that on Thursday while coming back from a  she was walking up the ledge of a sidewalk when she twisted her right ankle.  Denies falling and initially did not feel any pain.  She reports that she was able to ambulate after and went about her typical routine.  Yesterday she again went to a  and returned home.  She then went to bed and when she tried to get up later that evening she had severe right ankle pain and was unable to bear weight or ambulate.  She also noted swelling to the lateral malleolus.  Denies any additional injury.  Patient did not take anything for her symptoms.  Denies any numbness or tingling to the ankle.  Denies prior similar episodes.  No prior history of ankle injury.  Denies any calf pain or swelling. Patient presented to the emergency room with right ankle pain initially reported atraumatic but there is a remote history of a twisting incident several days prior.  At this time neurovascular intact.  High suspicion for possible sprain versus strain but cannot exclude underlying fracture.  Low suspicion for gout given the fact that there is no increased warmth or redness overlying the region.  Patient is concerned about a possible DVT given her cancer history.  Will obtain screening x-rays venous Doppler medicate for pain and monitor.  No evidence of DVT no evidence of fracture likely sprain strain.  Given degree of swelling and pain will immobilize in a splint and provide crutches as patient is to be nonweightbearing.  Medicate as needed for pain.  Patient advised that she will need to follow-up with podiatry outpatient for further work-up and imaging including but not limited to possible MRI imaging.  Advised that sometimes fractures can be missed on initial imaging and therefore prompt follow-up is important.

## 2022-12-03 NOTE — ED PROVIDER NOTE - OBJECTIVE STATEMENT
Patient is a 72-year-old female who presents emergency room with a chief complaint of ankle pain.  Past medical history of breast cancer status post left breast lumpectomy in 1999, colon cancer status postresection in 2001, CAD status post CABG x2 vessels in 2010, diabetes, hypertension, hyperlipidemia, renal insufficiency, vertigo. Patient is a 72-year-old female who presents emergency room with a chief complaint of ankle pain.  Past medical history of breast cancer status post left breast lumpectomy in , colon cancer status postresection in , CAD status post CABG x2 vessels in , diabetes, hypertension, hyperlipidemia, renal insufficiency, vertigo. Patient reports that on Thursday while coming back from a  she was walking up the ledge of a sidewalk when she twisted her right ankle.  Denies falling and initially did not feel any pain.  She reports that she was able to ambulate after and went about her typical routine.  Yesterday she again went to a  and returned home.  She then went to bed and when she tried to get up later that evening she had severe right ankle pain and was unable to bear weight or ambulate.  She also noted swelling to the lateral malleolus.  Denies any additional injury.  Patient did not take anything for her symptoms.  Denies any numbness or tingling to the ankle.  Denies prior similar episodes.  No prior history of ankle injury.  Denies any calf pain or swelling.

## 2022-12-03 NOTE — ED PROVIDER NOTE - NSFOLLOWUPINSTRUCTIONS_ED_ALL_ED_FT
Return to the ED for any new or worsening symptoms  Take your medication as prescribed  Percocet per label instructions as needed for severe pain. Remember when taking this that the medication contains Tylenol 325 mg   Keep splint in place until cleared by podiatry   Non weight bearing use crutches  Keep leg elevated to reduce swelling  Advance activity as tolerated     Ankle Sprain    Illustration of an ankle sprain caused by a foot turning outward and a foot turning inward.    An ankle sprain is a stretch or tear in one of the tough tissues (ligaments) that connect the bones in your ankle. An ankle sprain can happen when the ankle rolls outward (inversion sprain) or inward (eversion sprain).      What are the causes?    This condition is caused by rolling or twisting the ankle.      What increases the risk?    You are more likely to develop this condition if you play sports.      What are the signs or symptoms?    Symptoms of this condition include:  •Pain in your ankle.       •Swelling.       •Bruising. This may happen right after you sprain your ankle or 1–2 days later.      •Trouble standing or walking.        How is this diagnosed?    This condition is diagnosed with:  •A physical exam. During the exam, your doctor will press on certain parts of your foot and ankle and try to move them in certain ways.      •X-ray imaging. These may be taken to see how bad the sprain is and to check for broken bones.        How is this treated?    This condition may be treated with:  •A brace or splint. This is used to keep the ankle from moving until it heals.      •An elastic bandage. This is used to support the ankle.      •Crutches.      •Pain medicine.      •Surgery. This may be needed if the sprain is very bad.      •Physical therapy. This may help to improve movement in the ankle.        Follow these instructions at home:    If you have a brace or a splint:     •Wear the brace or splint as told by your doctor. Remove it only as told by your doctor.    •Loosen the brace or splint if your toes:  •Tingle.       •Lose feeling (become numb).      •Turn cold and blue.        •Keep the brace or splint clean.    •If the brace or splint is not waterproof:  •Do not let it get wet.      •Cover it with a watertight covering when you take a bath or a shower.        If you have an elastic bandage (dressing):     •Remove it to shower or bathe.       •Try not to move your ankle much, but wiggle your toes from time to time. This helps to prevent swelling.       •Adjust the dressing if it feels too tight.    •Loosen the dressing if your foot:   •Loses feeling.      •Tingles.      •Becomes cold and blue.          Managing pain, stiffness, and swelling   Bag of ice on a towel on the skin.   •Take over-the-counter and prescription medicines only as told by doctor.      •For 2–3 days, keep your ankle raised (elevated) above the level of your heart.    •If told, put ice on the injured area:  •If you have a removable brace or splint, remove it as told by your doctor.      •Put ice in a plastic bag.       •Place a towel between your skin and the bag.       •Leave the ice on for 20 minutes, 2–3 times a day.        General instructions     •Rest your ankle.      • Do not use your injured leg to support your body weight until your doctor says that you can. Use crutches as told by your doctor.      • Do not use any products that contain nicotine or tobacco, such as cigarettes, e-cigarettes, and chewing tobacco. If you need help quitting, ask your doctor.      •Keep all follow-up visits as told by your doctor.        Contact a doctor if:    •Your bruises or swelling are quickly getting worse.      •Your pain does not get better after you take medicine.        Get help right away if:    •You cannot feel your toes or foot.      •Your foot or toes look blue.      •You have very bad pain that gets worse.        Summary    •An ankle sprain is a stretch or tear in one of the tough tissues (ligaments) that connect the bones in your ankle.      •This condition is caused by rolling or twisting the ankle.      •Symptoms include pain, swelling, bruising, and trouble walking.      •To help with pain and swelling, put ice on the injured ankle, raise your ankle above the level of your heart, and use an elastic bandage. Also, rest as told by your doctor.      •Keep all follow-up visits as told by your doctor. This is important.      This information is not intended to replace advice given to you by your health care provider. Make sure you discuss any questions you have with your health care provider.

## 2022-12-03 NOTE — ED ADULT NURSE NOTE - OBJECTIVE STATEMENT
Patient received awake, alert, and orientated. No distress noted. Vital signs stable.   No obvious signs or symptoms of injury. Patient states she has right ankle pain

## 2022-12-14 ENCOUNTER — APPOINTMENT (OUTPATIENT)
Dept: WOUND CARE | Facility: HOSPITAL | Age: 72
End: 2022-12-14

## 2023-06-29 NOTE — ED ADULT TRIAGE NOTE - WEIGHT IN LBS
Attempted to reach pt at work.  Called his home number- left message with pt's wife to return call     [phone]

## 2025-01-01 ENCOUNTER — EMERGENCY (EMERGENCY)
Facility: HOSPITAL | Age: 75
LOS: 1 days | Discharge: ANOTHER HOSPITAL | End: 2025-01-01
Attending: EMERGENCY MEDICINE | Admitting: EMERGENCY MEDICINE
Payer: COMMERCIAL

## 2025-01-01 ENCOUNTER — RESULT REVIEW (OUTPATIENT)
Age: 75
End: 2025-01-01

## 2025-01-01 ENCOUNTER — INPATIENT (INPATIENT)
Facility: HOSPITAL | Age: 75
LOS: 7 days | DRG: 836 | End: 2025-01-15
Attending: SPECIALIST | Admitting: EMERGENCY MEDICINE
Payer: COMMERCIAL

## 2025-01-01 VITALS
HEART RATE: 86 BPM | OXYGEN SATURATION: 94 % | SYSTOLIC BLOOD PRESSURE: 167 MMHG | DIASTOLIC BLOOD PRESSURE: 79 MMHG | HEIGHT: 65 IN | RESPIRATION RATE: 16 BRPM | TEMPERATURE: 98 F

## 2025-01-01 VITALS
DIASTOLIC BLOOD PRESSURE: 73 MMHG | TEMPERATURE: 98 F | OXYGEN SATURATION: 98 % | HEIGHT: 65 IN | RESPIRATION RATE: 16 BRPM | WEIGHT: 205.03 LBS | SYSTOLIC BLOOD PRESSURE: 179 MMHG | HEART RATE: 85 BPM

## 2025-01-01 VITALS
OXYGEN SATURATION: 97 % | SYSTOLIC BLOOD PRESSURE: 154 MMHG | DIASTOLIC BLOOD PRESSURE: 67 MMHG | TEMPERATURE: 98 F | RESPIRATION RATE: 18 BRPM | HEART RATE: 80 BPM

## 2025-01-01 VITALS
OXYGEN SATURATION: 55 % | TEMPERATURE: 100 F | RESPIRATION RATE: 26 BRPM | SYSTOLIC BLOOD PRESSURE: 100 MMHG | DIASTOLIC BLOOD PRESSURE: 51 MMHG | HEART RATE: 118 BPM

## 2025-01-01 DIAGNOSIS — R06.00 DYSPNEA, UNSPECIFIED: ICD-10-CM

## 2025-01-01 DIAGNOSIS — I10 ESSENTIAL (PRIMARY) HYPERTENSION: ICD-10-CM

## 2025-01-01 DIAGNOSIS — Z51.5 ENCOUNTER FOR PALLIATIVE CARE: ICD-10-CM

## 2025-01-01 DIAGNOSIS — Z87.81 PERSONAL HISTORY OF (HEALED) TRAUMATIC FRACTURE: Chronic | ICD-10-CM

## 2025-01-01 DIAGNOSIS — Z95.1 PRESENCE OF AORTOCORONARY BYPASS GRAFT: Chronic | ICD-10-CM

## 2025-01-01 DIAGNOSIS — E11.9 TYPE 2 DIABETES MELLITUS WITHOUT COMPLICATIONS: ICD-10-CM

## 2025-01-01 DIAGNOSIS — B99.9 UNSPECIFIED INFECTIOUS DISEASE: ICD-10-CM

## 2025-01-01 DIAGNOSIS — C95.00 ACUTE LEUKEMIA OF UNSPECIFIED CELL TYPE NOT HAVING ACHIEVED REMISSION: ICD-10-CM

## 2025-01-01 DIAGNOSIS — Z29.9 ENCOUNTER FOR PROPHYLACTIC MEASURES, UNSPECIFIED: ICD-10-CM

## 2025-01-01 DIAGNOSIS — R45.1 RESTLESSNESS AND AGITATION: ICD-10-CM

## 2025-01-01 DIAGNOSIS — C92.00 ACUTE MYELOBLASTIC LEUKEMIA, NOT HAVING ACHIEVED REMISSION: ICD-10-CM

## 2025-01-01 DIAGNOSIS — R52 PAIN, UNSPECIFIED: ICD-10-CM

## 2025-01-01 LAB
A1C WITH ESTIMATED AVERAGE GLUCOSE RESULT: 8.4 % — HIGH (ref 4–5.6)
ADD ON TEST-SPECIMEN IN LAB: SIGNIFICANT CHANGE UP
ALBUMIN SERPL ELPH-MCNC: 2.2 G/DL — LOW (ref 3.3–5)
ALBUMIN SERPL ELPH-MCNC: 2.9 G/DL — LOW (ref 3.3–5)
ALBUMIN SERPL ELPH-MCNC: 3 G/DL — LOW (ref 3.3–5)
ALBUMIN SERPL ELPH-MCNC: 3 G/DL — LOW (ref 3.3–5)
ALBUMIN SERPL ELPH-MCNC: 3.3 G/DL — SIGNIFICANT CHANGE UP (ref 3.3–5)
ALBUMIN SERPL ELPH-MCNC: 3.5 G/DL — SIGNIFICANT CHANGE UP (ref 3.3–5)
ALBUMIN SERPL ELPH-MCNC: 3.5 G/DL — SIGNIFICANT CHANGE UP (ref 3.3–5)
ALBUMIN SERPL ELPH-MCNC: 3.6 G/DL — SIGNIFICANT CHANGE UP (ref 3.3–5)
ALBUMIN SERPL ELPH-MCNC: 3.7 G/DL — SIGNIFICANT CHANGE UP (ref 3.3–5)
ALBUMIN SERPL ELPH-MCNC: 3.7 G/DL — SIGNIFICANT CHANGE UP (ref 3.3–5)
ALBUMIN SERPL ELPH-MCNC: 3.9 G/DL — SIGNIFICANT CHANGE UP (ref 3.3–5)
ALBUMIN SERPL ELPH-MCNC: 4 G/DL — SIGNIFICANT CHANGE UP (ref 3.3–5)
ALBUMIN SERPL ELPH-MCNC: 4.1 G/DL — SIGNIFICANT CHANGE UP (ref 3.3–5)
ALBUMIN SERPL ELPH-MCNC: 4.2 G/DL — SIGNIFICANT CHANGE UP (ref 3.3–5)
ALP SERPL-CCNC: 100 U/L — SIGNIFICANT CHANGE UP (ref 40–120)
ALP SERPL-CCNC: 166 U/L — HIGH (ref 40–120)
ALP SERPL-CCNC: 270 U/L — HIGH (ref 40–120)
ALP SERPL-CCNC: 287 U/L — HIGH (ref 40–120)
ALP SERPL-CCNC: 377 U/L — HIGH (ref 40–120)
ALP SERPL-CCNC: 380 U/L — HIGH (ref 40–120)
ALP SERPL-CCNC: 392 U/L — HIGH (ref 40–120)
ALP SERPL-CCNC: 394 U/L — HIGH (ref 40–120)
ALP SERPL-CCNC: 405 U/L — HIGH (ref 40–120)
ALP SERPL-CCNC: 62 U/L — SIGNIFICANT CHANGE UP (ref 40–120)
ALP SERPL-CCNC: 64 U/L — SIGNIFICANT CHANGE UP (ref 40–120)
ALP SERPL-CCNC: 67 U/L — SIGNIFICANT CHANGE UP (ref 40–120)
ALP SERPL-CCNC: 71 U/L — SIGNIFICANT CHANGE UP (ref 40–120)
ALP SERPL-CCNC: 74 U/L — SIGNIFICANT CHANGE UP (ref 40–120)
ALP SERPL-CCNC: 78 U/L — SIGNIFICANT CHANGE UP (ref 40–120)
ALP SERPL-CCNC: 79 U/L — SIGNIFICANT CHANGE UP (ref 40–120)
ALP SERPL-CCNC: 89 U/L — SIGNIFICANT CHANGE UP (ref 40–120)
ALT FLD-CCNC: 10 U/L — SIGNIFICANT CHANGE UP (ref 10–45)
ALT FLD-CCNC: 14 U/L — SIGNIFICANT CHANGE UP (ref 10–45)
ALT FLD-CCNC: 14 U/L — SIGNIFICANT CHANGE UP (ref 10–45)
ALT FLD-CCNC: 15 U/L — SIGNIFICANT CHANGE UP (ref 10–45)
ALT FLD-CCNC: 15 U/L — SIGNIFICANT CHANGE UP (ref 10–45)
ALT FLD-CCNC: 18 U/L — SIGNIFICANT CHANGE UP (ref 10–45)
ALT FLD-CCNC: 18 U/L — SIGNIFICANT CHANGE UP (ref 10–45)
ALT FLD-CCNC: 19 U/L — SIGNIFICANT CHANGE UP (ref 10–45)
ALT FLD-CCNC: 20 U/L — SIGNIFICANT CHANGE UP (ref 10–45)
ALT FLD-CCNC: 21 U/L — SIGNIFICANT CHANGE UP (ref 10–45)
ALT FLD-CCNC: 22 U/L — SIGNIFICANT CHANGE UP (ref 10–45)
ALT FLD-CCNC: 24 U/L — SIGNIFICANT CHANGE UP (ref 10–45)
ALT FLD-CCNC: 25 U/L — SIGNIFICANT CHANGE UP (ref 12–78)
ALT FLD-CCNC: 26 U/L — SIGNIFICANT CHANGE UP (ref 10–45)
ALT FLD-CCNC: 30 U/L — SIGNIFICANT CHANGE UP (ref 10–45)
ALT FLD-CCNC: 31 U/L — SIGNIFICANT CHANGE UP (ref 10–45)
ANION GAP SERPL CALC-SCNC: 10 MMOL/L — SIGNIFICANT CHANGE UP (ref 5–17)
ANION GAP SERPL CALC-SCNC: 11 MMOL/L — SIGNIFICANT CHANGE UP (ref 5–17)
ANION GAP SERPL CALC-SCNC: 13 MMOL/L — SIGNIFICANT CHANGE UP (ref 5–17)
ANION GAP SERPL CALC-SCNC: 14 MMOL/L — SIGNIFICANT CHANGE UP (ref 5–17)
ANION GAP SERPL CALC-SCNC: 15 MMOL/L — SIGNIFICANT CHANGE UP (ref 5–17)
ANION GAP SERPL CALC-SCNC: 16 MMOL/L — SIGNIFICANT CHANGE UP (ref 5–17)
ANION GAP SERPL CALC-SCNC: 17 MMOL/L — SIGNIFICANT CHANGE UP (ref 5–17)
ANION GAP SERPL CALC-SCNC: 18 MMOL/L — HIGH (ref 5–17)
ANION GAP SERPL CALC-SCNC: 18 MMOL/L — HIGH (ref 5–17)
ANION GAP SERPL CALC-SCNC: 19 MMOL/L — HIGH (ref 5–17)
ANION GAP SERPL CALC-SCNC: 6 MMOL/L — SIGNIFICANT CHANGE UP (ref 5–17)
ANION GAP SERPL CALC-SCNC: 7 MMOL/L — SIGNIFICANT CHANGE UP (ref 5–17)
ANISOCYTOSIS BLD QL: SLIGHT — SIGNIFICANT CHANGE UP
ANISOCYTOSIS BLD QL: SLIGHT — SIGNIFICANT CHANGE UP
APPEARANCE UR: ABNORMAL
APPEARANCE UR: CLEAR — SIGNIFICANT CHANGE UP
APTT BLD: 23.4 SEC — LOW (ref 24.5–35.6)
APTT BLD: 24.2 SEC — LOW (ref 24.5–35.6)
APTT BLD: 25.3 SEC — SIGNIFICANT CHANGE UP (ref 24.5–35.6)
APTT BLD: 25.4 SEC — SIGNIFICANT CHANGE UP (ref 24.5–35.6)
APTT BLD: 25.4 SEC — SIGNIFICANT CHANGE UP (ref 24.5–35.6)
APTT BLD: 26.3 SEC — SIGNIFICANT CHANGE UP (ref 24.5–35.6)
APTT BLD: 26.5 SEC — SIGNIFICANT CHANGE UP (ref 24.5–35.6)
APTT BLD: 27.3 SEC — SIGNIFICANT CHANGE UP (ref 24.5–35.6)
APTT BLD: 28.1 SEC — SIGNIFICANT CHANGE UP (ref 24.5–35.6)
APTT BLD: 31 SEC — SIGNIFICANT CHANGE UP (ref 24.5–35.6)
APTT BLD: 31.7 SEC — SIGNIFICANT CHANGE UP (ref 24.5–35.6)
APTT BLD: 32.3 SEC — SIGNIFICANT CHANGE UP (ref 24.5–35.6)
AST SERPL-CCNC: 106 U/L — HIGH (ref 10–40)
AST SERPL-CCNC: 107 U/L — HIGH (ref 10–40)
AST SERPL-CCNC: 132 U/L — HIGH (ref 10–40)
AST SERPL-CCNC: 24 U/L — SIGNIFICANT CHANGE UP (ref 10–40)
AST SERPL-CCNC: 24 U/L — SIGNIFICANT CHANGE UP (ref 10–40)
AST SERPL-CCNC: 30 U/L — SIGNIFICANT CHANGE UP (ref 10–40)
AST SERPL-CCNC: 36 U/L — SIGNIFICANT CHANGE UP (ref 10–40)
AST SERPL-CCNC: 37 U/L — SIGNIFICANT CHANGE UP (ref 10–40)
AST SERPL-CCNC: 37 U/L — SIGNIFICANT CHANGE UP (ref 10–40)
AST SERPL-CCNC: 38 U/L — SIGNIFICANT CHANGE UP (ref 10–40)
AST SERPL-CCNC: 40 U/L — SIGNIFICANT CHANGE UP (ref 10–40)
AST SERPL-CCNC: 41 U/L — HIGH (ref 10–40)
AST SERPL-CCNC: 42 U/L — HIGH (ref 10–40)
AST SERPL-CCNC: 43 U/L — HIGH (ref 15–37)
AST SERPL-CCNC: 63 U/L — HIGH (ref 10–40)
AST SERPL-CCNC: 66 U/L — HIGH (ref 10–40)
AST SERPL-CCNC: 83 U/L — HIGH (ref 10–40)
AST SERPL-CCNC: 92 U/L — HIGH (ref 10–40)
AST SERPL-CCNC: 94 U/L — HIGH (ref 10–40)
AUER BODIES BLD QL SMEAR: PRESENT — SIGNIFICANT CHANGE UP
B-OH-BUTYR SERPL-SCNC: 0.3 MMOL/L — SIGNIFICANT CHANGE UP
BACTERIA # UR AUTO: ABNORMAL /HPF
BACTERIA # UR AUTO: NEGATIVE /HPF — SIGNIFICANT CHANGE UP
BASOPHILS # BLD AUTO: 0 K/UL — SIGNIFICANT CHANGE UP (ref 0–0.2)
BASOPHILS # BLD AUTO: 0.01 K/UL — SIGNIFICANT CHANGE UP (ref 0–0.2)
BASOPHILS # BLD AUTO: 0.02 K/UL — SIGNIFICANT CHANGE UP (ref 0–0.2)
BASOPHILS # BLD AUTO: 0.02 K/UL — SIGNIFICANT CHANGE UP (ref 0–0.2)
BASOPHILS # BLD AUTO: 0.03 K/UL — SIGNIFICANT CHANGE UP (ref 0–0.2)
BASOPHILS # BLD AUTO: 0.04 K/UL — SIGNIFICANT CHANGE UP (ref 0–0.2)
BASOPHILS # BLD AUTO: 0.1 K/UL — SIGNIFICANT CHANGE UP (ref 0–0.2)
BASOPHILS # BLD AUTO: 0.1 K/UL — SIGNIFICANT CHANGE UP (ref 0–0.2)
BASOPHILS # BLD AUTO: 0.36 K/UL — HIGH (ref 0–0.2)
BASOPHILS # BLD AUTO: 0.43 K/UL — HIGH (ref 0–0.2)
BASOPHILS # BLD AUTO: 0.62 K/UL — HIGH (ref 0–0.2)
BASOPHILS # BLD AUTO: 0.67 K/UL — HIGH (ref 0–0.2)
BASOPHILS # BLD AUTO: 0.99 K/UL — HIGH (ref 0–0.2)
BASOPHILS NFR BLD AUTO: 0 % — SIGNIFICANT CHANGE UP (ref 0–2)
BASOPHILS NFR BLD AUTO: 0.1 % — SIGNIFICANT CHANGE UP (ref 0–2)
BASOPHILS NFR BLD AUTO: 0.1 % — SIGNIFICANT CHANGE UP (ref 0–2)
BASOPHILS NFR BLD AUTO: 0.2 % — SIGNIFICANT CHANGE UP (ref 0–2)
BASOPHILS NFR BLD AUTO: 0.7 % — SIGNIFICANT CHANGE UP (ref 0–2)
BASOPHILS NFR BLD AUTO: 1 % — SIGNIFICANT CHANGE UP (ref 0–2)
BCR/ABL BY RT - PCR QUANTITATIVE: SIGNIFICANT CHANGE UP
BILIRUB SERPL-MCNC: 0.6 MG/DL — SIGNIFICANT CHANGE UP (ref 0.2–1.2)
BILIRUB SERPL-MCNC: 0.6 MG/DL — SIGNIFICANT CHANGE UP (ref 0.2–1.2)
BILIRUB SERPL-MCNC: 0.7 MG/DL — SIGNIFICANT CHANGE UP (ref 0.2–1.2)
BILIRUB SERPL-MCNC: 0.8 MG/DL — SIGNIFICANT CHANGE UP (ref 0.2–1.2)
BILIRUB SERPL-MCNC: 0.9 MG/DL — SIGNIFICANT CHANGE UP (ref 0.2–1.2)
BILIRUB SERPL-MCNC: 0.9 MG/DL — SIGNIFICANT CHANGE UP (ref 0.2–1.2)
BILIRUB SERPL-MCNC: 1 MG/DL — SIGNIFICANT CHANGE UP (ref 0.2–1.2)
BILIRUB SERPL-MCNC: 1.2 MG/DL — SIGNIFICANT CHANGE UP (ref 0.2–1.2)
BILIRUB SERPL-MCNC: 1.2 MG/DL — SIGNIFICANT CHANGE UP (ref 0.2–1.2)
BILIRUB SERPL-MCNC: 1.4 MG/DL — HIGH (ref 0.2–1.2)
BILIRUB UR-MCNC: NEGATIVE — SIGNIFICANT CHANGE UP
BLASTS # FLD: 69 % — HIGH (ref 0–0)
BLASTS # FLD: 85 % — HIGH (ref 0–0)
BLASTS # FLD: 88.2 % — HIGH (ref 0–0)
BLASTS # FLD: 89 % — HIGH (ref 0–0)
BLASTS # FLD: 89.2 % — HIGH (ref 0–0)
BLASTS # FLD: 89.8 % — HIGH (ref 0–0)
BLASTS # FLD: 93.3 % — HIGH (ref 0–0)
BLASTS # FLD: 94.4 % — HIGH (ref 0–0)
BLD GP AB SCN SERPL QL: NEGATIVE — SIGNIFICANT CHANGE UP
BUN SERPL-MCNC: 16 MG/DL — SIGNIFICANT CHANGE UP (ref 7–23)
BUN SERPL-MCNC: 17 MG/DL — SIGNIFICANT CHANGE UP (ref 7–23)
BUN SERPL-MCNC: 17 MG/DL — SIGNIFICANT CHANGE UP (ref 7–23)
BUN SERPL-MCNC: 18 MG/DL — SIGNIFICANT CHANGE UP (ref 7–23)
BUN SERPL-MCNC: 19 MG/DL — SIGNIFICANT CHANGE UP (ref 7–23)
BUN SERPL-MCNC: 19 MG/DL — SIGNIFICANT CHANGE UP (ref 7–23)
BUN SERPL-MCNC: 20 MG/DL — SIGNIFICANT CHANGE UP (ref 7–23)
BUN SERPL-MCNC: 21 MG/DL — SIGNIFICANT CHANGE UP (ref 7–23)
BUN SERPL-MCNC: 22 MG/DL — SIGNIFICANT CHANGE UP (ref 7–23)
BUN SERPL-MCNC: 29 MG/DL — HIGH (ref 7–23)
BUN SERPL-MCNC: 38 MG/DL — HIGH (ref 7–23)
BUN SERPL-MCNC: 44 MG/DL — HIGH (ref 7–23)
BUN SERPL-MCNC: 47 MG/DL — HIGH (ref 7–23)
BUN SERPL-MCNC: 59 MG/DL — HIGH (ref 7–23)
BUN SERPL-MCNC: 64 MG/DL — HIGH (ref 7–23)
BUN SERPL-MCNC: 75 MG/DL — HIGH (ref 7–23)
BUN SERPL-MCNC: 79 MG/DL — HIGH (ref 7–23)
CALCIUM SERPL-MCNC: 5.5 MG/DL — CRITICAL LOW (ref 8.4–10.5)
CALCIUM SERPL-MCNC: 7.8 MG/DL — LOW (ref 8.4–10.5)
CALCIUM SERPL-MCNC: 7.9 MG/DL — LOW (ref 8.4–10.5)
CALCIUM SERPL-MCNC: 8.3 MG/DL — LOW (ref 8.4–10.5)
CALCIUM SERPL-MCNC: 8.3 MG/DL — LOW (ref 8.4–10.5)
CALCIUM SERPL-MCNC: 8.4 MG/DL — SIGNIFICANT CHANGE UP (ref 8.4–10.5)
CALCIUM SERPL-MCNC: 8.6 MG/DL — SIGNIFICANT CHANGE UP (ref 8.4–10.5)
CALCIUM SERPL-MCNC: 8.8 MG/DL — SIGNIFICANT CHANGE UP (ref 8.4–10.5)
CALCIUM SERPL-MCNC: 8.9 MG/DL — SIGNIFICANT CHANGE UP (ref 8.4–10.5)
CALCIUM SERPL-MCNC: 9.2 MG/DL — SIGNIFICANT CHANGE UP (ref 8.4–10.5)
CALCIUM SERPL-MCNC: 9.4 MG/DL — SIGNIFICANT CHANGE UP (ref 8.4–10.5)
CALCIUM SERPL-MCNC: 9.4 MG/DL — SIGNIFICANT CHANGE UP (ref 8.5–10.1)
CALCIUM SERPL-MCNC: 9.5 MG/DL — SIGNIFICANT CHANGE UP (ref 8.4–10.5)
CALCIUM SERPL-MCNC: 9.6 MG/DL — SIGNIFICANT CHANGE UP (ref 8.4–10.5)
CALCIUM SERPL-MCNC: 9.7 MG/DL — SIGNIFICANT CHANGE UP (ref 8.4–10.5)
CAST: 2 /LPF — SIGNIFICANT CHANGE UP (ref 0–4)
CAST: 4 /LPF — SIGNIFICANT CHANGE UP (ref 0–4)
CHLORIDE SERPL-SCNC: 100 MMOL/L — SIGNIFICANT CHANGE UP (ref 96–108)
CHLORIDE SERPL-SCNC: 101 MMOL/L — SIGNIFICANT CHANGE UP (ref 96–108)
CHLORIDE SERPL-SCNC: 103 MMOL/L — SIGNIFICANT CHANGE UP (ref 96–108)
CHLORIDE SERPL-SCNC: 105 MMOL/L — SIGNIFICANT CHANGE UP (ref 96–108)
CHLORIDE SERPL-SCNC: 86 MMOL/L — LOW (ref 96–108)
CHLORIDE SERPL-SCNC: 87 MMOL/L — LOW (ref 96–108)
CHLORIDE SERPL-SCNC: 89 MMOL/L — LOW (ref 96–108)
CHLORIDE SERPL-SCNC: 90 MMOL/L — LOW (ref 96–108)
CHLORIDE SERPL-SCNC: 90 MMOL/L — LOW (ref 96–108)
CHLORIDE SERPL-SCNC: 91 MMOL/L — LOW (ref 96–108)
CHLORIDE SERPL-SCNC: 92 MMOL/L — LOW (ref 96–108)
CHLORIDE SERPL-SCNC: 95 MMOL/L — LOW (ref 96–108)
CHLORIDE SERPL-SCNC: 97 MMOL/L — SIGNIFICANT CHANGE UP (ref 96–108)
CHLORIDE SERPL-SCNC: 98 MMOL/L — SIGNIFICANT CHANGE UP (ref 96–108)
CHLORIDE SERPL-SCNC: 99 MMOL/L — SIGNIFICANT CHANGE UP (ref 96–108)
CHROM ANALY INTERPHASE BLD FISH-IMP: SIGNIFICANT CHANGE UP
CK SERPL-CCNC: 98 U/L — SIGNIFICANT CHANGE UP (ref 26–192)
CO2 SERPL-SCNC: 15 MMOL/L — LOW (ref 22–31)
CO2 SERPL-SCNC: 15 MMOL/L — LOW (ref 22–31)
CO2 SERPL-SCNC: 17 MMOL/L — LOW (ref 22–31)
CO2 SERPL-SCNC: 18 MMOL/L — LOW (ref 22–31)
CO2 SERPL-SCNC: 18 MMOL/L — LOW (ref 22–31)
CO2 SERPL-SCNC: 19 MMOL/L — LOW (ref 22–31)
CO2 SERPL-SCNC: 19 MMOL/L — LOW (ref 22–31)
CO2 SERPL-SCNC: 20 MMOL/L — LOW (ref 22–31)
CO2 SERPL-SCNC: 21 MMOL/L — LOW (ref 22–31)
CO2 SERPL-SCNC: 22 MMOL/L — SIGNIFICANT CHANGE UP (ref 22–31)
CO2 SERPL-SCNC: 23 MMOL/L — SIGNIFICANT CHANGE UP (ref 22–31)
CO2 SERPL-SCNC: 25 MMOL/L — SIGNIFICANT CHANGE UP (ref 22–31)
CO2 SERPL-SCNC: 25 MMOL/L — SIGNIFICANT CHANGE UP (ref 22–31)
CO2 SERPL-SCNC: 26 MMOL/L — SIGNIFICANT CHANGE UP (ref 22–31)
CO2 SERPL-SCNC: 27 MMOL/L — SIGNIFICANT CHANGE UP (ref 22–31)
COLOR SPEC: YELLOW — SIGNIFICANT CHANGE UP
CREAT ?TM UR-MCNC: 72 MG/DL — SIGNIFICANT CHANGE UP
CREAT SERPL-MCNC: 0.91 MG/DL — SIGNIFICANT CHANGE UP (ref 0.5–1.3)
CREAT SERPL-MCNC: 0.91 MG/DL — SIGNIFICANT CHANGE UP (ref 0.5–1.3)
CREAT SERPL-MCNC: 0.92 MG/DL — SIGNIFICANT CHANGE UP (ref 0.5–1.3)
CREAT SERPL-MCNC: 0.93 MG/DL — SIGNIFICANT CHANGE UP (ref 0.5–1.3)
CREAT SERPL-MCNC: 1 MG/DL — SIGNIFICANT CHANGE UP (ref 0.5–1.3)
CREAT SERPL-MCNC: 1.03 MG/DL — SIGNIFICANT CHANGE UP (ref 0.5–1.3)
CREAT SERPL-MCNC: 1.04 MG/DL — SIGNIFICANT CHANGE UP (ref 0.5–1.3)
CREAT SERPL-MCNC: 1.05 MG/DL — SIGNIFICANT CHANGE UP (ref 0.5–1.3)
CREAT SERPL-MCNC: 1.05 MG/DL — SIGNIFICANT CHANGE UP (ref 0.5–1.3)
CREAT SERPL-MCNC: 1.1 MG/DL — SIGNIFICANT CHANGE UP (ref 0.5–1.3)
CREAT SERPL-MCNC: 1.11 MG/DL — SIGNIFICANT CHANGE UP (ref 0.5–1.3)
CREAT SERPL-MCNC: 1.2 MG/DL — SIGNIFICANT CHANGE UP (ref 0.5–1.3)
CREAT SERPL-MCNC: 1.37 MG/DL — HIGH (ref 0.5–1.3)
CREAT SERPL-MCNC: 1.55 MG/DL — HIGH (ref 0.5–1.3)
CREAT SERPL-MCNC: 1.66 MG/DL — HIGH (ref 0.5–1.3)
CREAT SERPL-MCNC: 2.09 MG/DL — HIGH (ref 0.5–1.3)
CREAT SERPL-MCNC: 2.26 MG/DL — HIGH (ref 0.5–1.3)
CREAT SERPL-MCNC: 2.68 MG/DL — HIGH (ref 0.5–1.3)
CREAT SERPL-MCNC: 2.85 MG/DL — HIGH (ref 0.5–1.3)
CULTURE RESULTS: NO GROWTH — SIGNIFICANT CHANGE UP
CULTURE RESULTS: NO GROWTH — SIGNIFICANT CHANGE UP
CULTURE RESULTS: SIGNIFICANT CHANGE UP
D DIMER BLD IA.RAPID-MCNC: HIGH NG/ML DDU
DACRYOCYTES BLD QL SMEAR: SLIGHT — SIGNIFICANT CHANGE UP
DIFF PNL FLD: ABNORMAL
DIFF PNL FLD: NEGATIVE — SIGNIFICANT CHANGE UP
EGFR: 17 ML/MIN/1.73M2 — LOW
EGFR: 18 ML/MIN/1.73M2 — LOW
EGFR: 22 ML/MIN/1.73M2 — LOW
EGFR: 24 ML/MIN/1.73M2 — LOW
EGFR: 32 ML/MIN/1.73M2 — LOW
EGFR: 35 ML/MIN/1.73M2 — LOW
EGFR: 40 ML/MIN/1.73M2 — LOW
EGFR: 47 ML/MIN/1.73M2 — LOW
EGFR: 52 ML/MIN/1.73M2 — LOW
EGFR: 52 ML/MIN/1.73M2 — LOW
EGFR: 55 ML/MIN/1.73M2 — LOW
EGFR: 55 ML/MIN/1.73M2 — LOW
EGFR: 56 ML/MIN/1.73M2 — LOW
EGFR: 56 ML/MIN/1.73M2 — LOW
EGFR: 58 ML/MIN/1.73M2 — LOW
EGFR: 64 ML/MIN/1.73M2 — SIGNIFICANT CHANGE UP
EGFR: 65 ML/MIN/1.73M2 — SIGNIFICANT CHANGE UP
EOSINOPHIL # BLD AUTO: 0 K/UL — SIGNIFICANT CHANGE UP (ref 0–0.5)
EOSINOPHIL # BLD AUTO: 0.01 K/UL — SIGNIFICANT CHANGE UP (ref 0–0.5)
EOSINOPHIL # BLD AUTO: 0.5 K/UL — SIGNIFICANT CHANGE UP (ref 0–0.5)
EOSINOPHIL NFR BLD AUTO: 0 % — SIGNIFICANT CHANGE UP (ref 0–6)
EOSINOPHIL NFR BLD AUTO: 0.7 % — SIGNIFICANT CHANGE UP (ref 0–6)
EPI CELLS # UR: PRESENT
ESTIMATED AVERAGE GLUCOSE: 194 MG/DL — HIGH (ref 68–114)
FERRITIN SERPL-MCNC: 531 NG/ML — HIGH (ref 13–330)
FIBRINOGEN PPP-MCNC: 145 MG/DL — LOW (ref 200–445)
FIBRINOGEN PPP-MCNC: 166 MG/DL — LOW (ref 200–445)
FIBRINOGEN PPP-MCNC: 172 MG/DL — LOW (ref 200–445)
FIBRINOGEN PPP-MCNC: 177 MG/DL — LOW (ref 200–445)
FIBRINOGEN PPP-MCNC: 194 MG/DL — LOW (ref 200–445)
FIBRINOGEN PPP-MCNC: 227 MG/DL — SIGNIFICANT CHANGE UP (ref 200–445)
FIBRINOGEN PPP-MCNC: 232 MG/DL — SIGNIFICANT CHANGE UP (ref 200–445)
FIBRINOGEN PPP-MCNC: 235 MG/DL — SIGNIFICANT CHANGE UP (ref 200–445)
FLUAV AG NPH QL: SIGNIFICANT CHANGE UP
FLUAV AG NPH QL: SIGNIFICANT CHANGE UP
FLUBV AG NPH QL: SIGNIFICANT CHANGE UP
FLUBV AG NPH QL: SIGNIFICANT CHANGE UP
FOLATE SERPL-MCNC: >20 NG/ML — SIGNIFICANT CHANGE UP
G6PD RBC-CCNC: 22.6 U/G HGB — HIGH (ref 7–20.5)
GAS PNL BLDA: SIGNIFICANT CHANGE UP
GAS PNL BLDV: SIGNIFICANT CHANGE UP
GLUCOSE BLDC GLUCOMTR-MCNC: 127 MG/DL — HIGH (ref 70–99)
GLUCOSE BLDC GLUCOMTR-MCNC: 155 MG/DL — HIGH (ref 70–99)
GLUCOSE BLDC GLUCOMTR-MCNC: 156 MG/DL — HIGH (ref 70–99)
GLUCOSE BLDC GLUCOMTR-MCNC: 163 MG/DL — HIGH (ref 70–99)
GLUCOSE BLDC GLUCOMTR-MCNC: 171 MG/DL — HIGH (ref 70–99)
GLUCOSE BLDC GLUCOMTR-MCNC: 173 MG/DL — HIGH (ref 70–99)
GLUCOSE BLDC GLUCOMTR-MCNC: 175 MG/DL — HIGH (ref 70–99)
GLUCOSE BLDC GLUCOMTR-MCNC: 183 MG/DL — HIGH (ref 70–99)
GLUCOSE BLDC GLUCOMTR-MCNC: 184 MG/DL — HIGH (ref 70–99)
GLUCOSE BLDC GLUCOMTR-MCNC: 186 MG/DL — HIGH (ref 70–99)
GLUCOSE BLDC GLUCOMTR-MCNC: 191 MG/DL — HIGH (ref 70–99)
GLUCOSE BLDC GLUCOMTR-MCNC: 192 MG/DL — HIGH (ref 70–99)
GLUCOSE BLDC GLUCOMTR-MCNC: 210 MG/DL — HIGH (ref 70–99)
GLUCOSE BLDC GLUCOMTR-MCNC: 218 MG/DL — HIGH (ref 70–99)
GLUCOSE BLDC GLUCOMTR-MCNC: 223 MG/DL — HIGH (ref 70–99)
GLUCOSE BLDC GLUCOMTR-MCNC: 228 MG/DL — HIGH (ref 70–99)
GLUCOSE BLDC GLUCOMTR-MCNC: 230 MG/DL — HIGH (ref 70–99)
GLUCOSE BLDC GLUCOMTR-MCNC: 238 MG/DL — HIGH (ref 70–99)
GLUCOSE BLDC GLUCOMTR-MCNC: 241 MG/DL — HIGH (ref 70–99)
GLUCOSE BLDC GLUCOMTR-MCNC: 241 MG/DL — HIGH (ref 70–99)
GLUCOSE BLDC GLUCOMTR-MCNC: 254 MG/DL — HIGH (ref 70–99)
GLUCOSE BLDC GLUCOMTR-MCNC: 257 MG/DL — HIGH (ref 70–99)
GLUCOSE BLDC GLUCOMTR-MCNC: 313 MG/DL — HIGH (ref 70–99)
GLUCOSE BLDC GLUCOMTR-MCNC: 314 MG/DL — HIGH (ref 70–99)
GLUCOSE BLDC GLUCOMTR-MCNC: 319 MG/DL — HIGH (ref 70–99)
GLUCOSE BLDC GLUCOMTR-MCNC: 341 MG/DL — HIGH (ref 70–99)
GLUCOSE BLDC GLUCOMTR-MCNC: 370 MG/DL — HIGH (ref 70–99)
GLUCOSE SERPL-MCNC: 133 MG/DL — HIGH (ref 70–99)
GLUCOSE SERPL-MCNC: 162 MG/DL — HIGH (ref 70–99)
GLUCOSE SERPL-MCNC: 163 MG/DL — HIGH (ref 70–99)
GLUCOSE SERPL-MCNC: 163 MG/DL — HIGH (ref 70–99)
GLUCOSE SERPL-MCNC: 169 MG/DL — HIGH (ref 70–99)
GLUCOSE SERPL-MCNC: 193 MG/DL — HIGH (ref 70–99)
GLUCOSE SERPL-MCNC: 194 MG/DL — HIGH (ref 70–99)
GLUCOSE SERPL-MCNC: 207 MG/DL — HIGH (ref 70–99)
GLUCOSE SERPL-MCNC: 217 MG/DL — HIGH (ref 70–99)
GLUCOSE SERPL-MCNC: 222 MG/DL — HIGH (ref 70–99)
GLUCOSE SERPL-MCNC: 226 MG/DL — HIGH (ref 70–99)
GLUCOSE SERPL-MCNC: 229 MG/DL — HIGH (ref 70–99)
GLUCOSE SERPL-MCNC: 232 MG/DL — HIGH (ref 70–99)
GLUCOSE SERPL-MCNC: 266 MG/DL — HIGH (ref 70–99)
GLUCOSE SERPL-MCNC: 299 MG/DL — HIGH (ref 70–99)
GLUCOSE SERPL-MCNC: 311 MG/DL — HIGH (ref 70–99)
GLUCOSE SERPL-MCNC: 326 MG/DL — HIGH (ref 70–99)
GLUCOSE SERPL-MCNC: 341 MG/DL — HIGH (ref 70–99)
GLUCOSE SERPL-MCNC: 624 MG/DL — CRITICAL HIGH (ref 70–99)
GLUCOSE UR QL: NEGATIVE MG/DL — SIGNIFICANT CHANGE UP
HAPTOGLOB SERPL-MCNC: 104 MG/DL — SIGNIFICANT CHANGE UP (ref 34–200)
HAPTOGLOB SERPL-MCNC: 135 MG/DL — SIGNIFICANT CHANGE UP (ref 34–200)
HAPTOGLOB SERPL-MCNC: 140 MG/DL — SIGNIFICANT CHANGE UP (ref 34–200)
HAPTOGLOB SERPL-MCNC: 161 MG/DL — SIGNIFICANT CHANGE UP (ref 34–200)
HAPTOGLOB SERPL-MCNC: 177 MG/DL — SIGNIFICANT CHANGE UP (ref 34–200)
HAV IGM SER-ACNC: SIGNIFICANT CHANGE UP
HBV CORE AB SER-ACNC: SIGNIFICANT CHANGE UP
HBV CORE IGM SER-ACNC: SIGNIFICANT CHANGE UP
HBV SURFACE AB SER-ACNC: <3 MIU/ML — LOW
HBV SURFACE AG SER-ACNC: SIGNIFICANT CHANGE UP
HCT VFR BLD CALC: 16 % — CRITICAL LOW (ref 34.5–45)
HCT VFR BLD CALC: 17.1 % — CRITICAL LOW (ref 34.5–45)
HCT VFR BLD CALC: 20.3 % — CRITICAL LOW (ref 34.5–45)
HCT VFR BLD CALC: 20.5 % — CRITICAL LOW (ref 34.5–45)
HCT VFR BLD CALC: 22.1 % — LOW (ref 34.5–45)
HCT VFR BLD CALC: 23 % — LOW (ref 34.5–45)
HCT VFR BLD CALC: 23.2 % — LOW (ref 34.5–45)
HCT VFR BLD CALC: 23.4 % — LOW (ref 34.5–45)
HCT VFR BLD CALC: 24 % — LOW (ref 34.5–45)
HCT VFR BLD CALC: 24.2 % — LOW (ref 34.5–45)
HCT VFR BLD CALC: 24.6 % — LOW (ref 34.5–45)
HCT VFR BLD CALC: 24.9 % — LOW (ref 34.5–45)
HCT VFR BLD CALC: 25.2 % — LOW (ref 34.5–45)
HCT VFR BLD CALC: 26.8 % — LOW (ref 34.5–45)
HCT VFR BLD CALC: 27.7 % — LOW (ref 34.5–45)
HCT VFR BLD CALC: 27.8 % — LOW (ref 34.5–45)
HCT VFR BLD CALC: 28 % — LOW (ref 34.5–45)
HCT VFR BLD CALC: 28.8 % — LOW (ref 34.5–45)
HCT VFR BLD CALC: 29.2 % — LOW (ref 34.5–45)
HCT VFR BLD CALC: 29.5 % — LOW (ref 34.5–45)
HCV AB S/CO SERPL IA: 0.11 S/CO — SIGNIFICANT CHANGE UP (ref 0–0.99)
HCV AB SERPL-IMP: SIGNIFICANT CHANGE UP
HEPARINASE TEG R TIME: 7.8 MIN — SIGNIFICANT CHANGE UP (ref 4.3–8.3)
HGB BLD-MCNC: 5.5 G/DL — CRITICAL LOW (ref 11.5–15.5)
HGB BLD-MCNC: 5.7 G/DL — CRITICAL LOW (ref 11.5–15.5)
HGB BLD-MCNC: 6.4 G/DL — CRITICAL LOW (ref 11.5–15.5)
HGB BLD-MCNC: 6.9 G/DL — CRITICAL LOW (ref 11.5–15.5)
HGB BLD-MCNC: 7.5 G/DL — LOW (ref 11.5–15.5)
HGB BLD-MCNC: 7.7 G/DL — LOW (ref 11.5–15.5)
HGB BLD-MCNC: 7.9 G/DL — LOW (ref 11.5–15.5)
HGB BLD-MCNC: 8 G/DL — LOW (ref 11.5–15.5)
HGB BLD-MCNC: 8 G/DL — LOW (ref 11.5–15.5)
HGB BLD-MCNC: 8.1 G/DL — LOW (ref 11.5–15.5)
HGB BLD-MCNC: 8.2 G/DL — LOW (ref 11.5–15.5)
HGB BLD-MCNC: 8.2 G/DL — LOW (ref 11.5–15.5)
HGB BLD-MCNC: 8.3 G/DL — LOW (ref 11.5–15.5)
HGB BLD-MCNC: 8.5 G/DL — LOW (ref 11.5–15.5)
HGB BLD-MCNC: 8.9 G/DL — LOW (ref 11.5–15.5)
HGB BLD-MCNC: 8.9 G/DL — LOW (ref 11.5–15.5)
HGB BLD-MCNC: 9 G/DL — LOW (ref 11.5–15.5)
HGB BLD-MCNC: 9.3 G/DL — LOW (ref 11.5–15.5)
HGB BLD-MCNC: 9.5 G/DL — LOW (ref 11.5–15.5)
HGB BLD-MCNC: 9.5 G/DL — LOW (ref 11.5–15.5)
HIV 1+2 AB+HIV1 P24 AG SERPL QL IA: SIGNIFICANT CHANGE UP
HLX FLT3 FINAL REPORT: SIGNIFICANT CHANGE UP
HYPOCHROMIA BLD QL: SLIGHT — SIGNIFICANT CHANGE UP
IMM GRANULOCYTES NFR BLD AUTO: 0.1 % — SIGNIFICANT CHANGE UP (ref 0–0.9)
IMM GRANULOCYTES NFR BLD AUTO: 0.3 % — SIGNIFICANT CHANGE UP (ref 0–0.9)
IMM GRANULOCYTES NFR BLD AUTO: 0.8 % — SIGNIFICANT CHANGE UP (ref 0–0.9)
INR BLD: 1.13 RATIO — SIGNIFICANT CHANGE UP (ref 0.85–1.16)
INR BLD: 1.14 RATIO — SIGNIFICANT CHANGE UP (ref 0.85–1.16)
INR BLD: 1.14 RATIO — SIGNIFICANT CHANGE UP (ref 0.85–1.16)
INR BLD: 1.18 RATIO — HIGH (ref 0.85–1.16)
INR BLD: 1.26 RATIO — HIGH (ref 0.85–1.16)
INR BLD: 1.27 RATIO — HIGH (ref 0.85–1.16)
INR BLD: 1.39 RATIO — HIGH (ref 0.85–1.16)
INR BLD: 1.4 RATIO — HIGH (ref 0.85–1.16)
INR BLD: 1.46 RATIO — HIGH (ref 0.85–1.16)
INR BLD: 1.49 RATIO — HIGH (ref 0.85–1.16)
IRON SATN MFR SERPL: 132 UG/DL — SIGNIFICANT CHANGE UP (ref 30–160)
IRON SATN MFR SERPL: 47 % — SIGNIFICANT CHANGE UP (ref 14–50)
KETONES UR-MCNC: ABNORMAL MG/DL
KETONES UR-MCNC: NEGATIVE MG/DL — SIGNIFICANT CHANGE UP
LACTATE SERPL-SCNC: 0.9 MMOL/L — SIGNIFICANT CHANGE UP (ref 0.7–2)
LDH SERPL L TO P-CCNC: 1079 U/L — HIGH (ref 50–242)
LDH SERPL L TO P-CCNC: 384 U/L — HIGH (ref 50–242)
LDH SERPL L TO P-CCNC: 400 U/L — HIGH (ref 50–242)
LDH SERPL L TO P-CCNC: 438 U/L — HIGH (ref 50–242)
LDH SERPL L TO P-CCNC: 4432 U/L — HIGH (ref 50–242)
LDH SERPL L TO P-CCNC: 4550 U/L — HIGH (ref 50–242)
LDH SERPL L TO P-CCNC: 459 U/L — HIGH (ref 50–242)
LDH SERPL L TO P-CCNC: 5167 U/L — HIGH (ref 50–242)
LDH SERPL L TO P-CCNC: 528 U/L — HIGH (ref 50–242)
LDH SERPL L TO P-CCNC: 544 U/L — HIGH (ref 50–242)
LDH SERPL L TO P-CCNC: 574 U/L — HIGH (ref 50–242)
LDH SERPL L TO P-CCNC: 5935 U/L — HIGH (ref 50–242)
LDH SERPL L TO P-CCNC: 6852 U/L — HIGH (ref 50–242)
LDH SERPL L TO P-CCNC: 705 U/L — HIGH (ref 50–242)
LEUKOCYTE ESTERASE UR-ACNC: NEGATIVE — SIGNIFICANT CHANGE UP
LIDOCAIN IGE QN: 25 U/L — SIGNIFICANT CHANGE UP (ref 13–75)
LYMPHOCYTES # BLD AUTO: 10 % — LOW (ref 13–44)
LYMPHOCYTES # BLD AUTO: 11.6 % — LOW (ref 13–44)
LYMPHOCYTES # BLD AUTO: 16.57 K/UL — HIGH (ref 1–3.3)
LYMPHOCYTES # BLD AUTO: 17.6 K/UL — HIGH (ref 1–3.3)
LYMPHOCYTES # BLD AUTO: 20.5 % — SIGNIFICANT CHANGE UP (ref 13–44)
LYMPHOCYTES # BLD AUTO: 20.9 K/UL — HIGH (ref 1–3.3)
LYMPHOCYTES # BLD AUTO: 22.89 K/UL — HIGH (ref 1–3.3)
LYMPHOCYTES # BLD AUTO: 24 % — SIGNIFICANT CHANGE UP (ref 13–44)
LYMPHOCYTES # BLD AUTO: 24.9 % — SIGNIFICANT CHANGE UP (ref 13–44)
LYMPHOCYTES # BLD AUTO: 25.9 % — SIGNIFICANT CHANGE UP (ref 13–44)
LYMPHOCYTES # BLD AUTO: 3.4 % — LOW (ref 13–44)
LYMPHOCYTES # BLD AUTO: 32.5 % — SIGNIFICANT CHANGE UP (ref 13–44)
LYMPHOCYTES # BLD AUTO: 4.71 K/UL — HIGH (ref 1–3.3)
LYMPHOCYTES # BLD AUTO: 4.9 % — LOW (ref 13–44)
LYMPHOCYTES # BLD AUTO: 4.98 K/UL — HIGH (ref 1–3.3)
LYMPHOCYTES # BLD AUTO: 41.68 K/UL — HIGH (ref 1–3.3)
LYMPHOCYTES # BLD AUTO: 5.22 K/UL — HIGH (ref 1–3.3)
LYMPHOCYTES # BLD AUTO: 5.47 K/UL — HIGH (ref 1–3.3)
LYMPHOCYTES # BLD AUTO: 5.6 % — LOW (ref 13–44)
LYMPHOCYTES # BLD AUTO: 59.19 K/UL — HIGH (ref 1–3.3)
LYMPHOCYTES # BLD AUTO: 6 % — LOW (ref 13–44)
LYMPHOCYTES # BLD AUTO: 6.3 % — LOW (ref 13–44)
LYMPHOCYTES # BLD AUTO: 6.43 K/UL — HIGH (ref 1–3.3)
LYMPHOCYTES # BLD AUTO: 6.51 K/UL — HIGH (ref 1–3.3)
LYMPHOCYTES # BLD AUTO: 7 % — LOW (ref 13–44)
LYMPHOCYTES # BLD AUTO: 7.16 K/UL — HIGH (ref 1–3.3)
LYMPHOCYTES # BLD AUTO: 7.6 % — LOW (ref 13–44)
LYMPHOCYTES # BLD AUTO: 71.44 K/UL — HIGH (ref 1–3.3)
LYMPHOCYTES # BLD AUTO: 8.26 K/UL — HIGH (ref 1–3.3)
LYMPHOCYTES # BLD AUTO: 8.6 % — LOW (ref 13–44)
LYMPHOCYTES # BLD AUTO: 8.7 % — LOW (ref 13–44)
LYMPHOCYTES # BLD AUTO: 9.4 % — LOW (ref 13–44)
LYMPHOCYTES # BLD AUTO: 9.41 K/UL — HIGH (ref 1–3.3)
LYMPHOCYTES # BLD AUTO: 9.93 K/UL — HIGH (ref 1–3.3)
MACROCYTES BLD QL: SLIGHT — SIGNIFICANT CHANGE UP
MAGNESIUM SERPL-MCNC: 1.4 MG/DL — LOW (ref 1.6–2.6)
MAGNESIUM SERPL-MCNC: 1.5 MG/DL — LOW (ref 1.6–2.6)
MAGNESIUM SERPL-MCNC: 1.6 MG/DL — SIGNIFICANT CHANGE UP (ref 1.6–2.6)
MAGNESIUM SERPL-MCNC: 1.6 MG/DL — SIGNIFICANT CHANGE UP (ref 1.6–2.6)
MAGNESIUM SERPL-MCNC: 1.7 MG/DL — SIGNIFICANT CHANGE UP (ref 1.6–2.6)
MAGNESIUM SERPL-MCNC: 1.8 MG/DL — SIGNIFICANT CHANGE UP (ref 1.6–2.6)
MAGNESIUM SERPL-MCNC: 1.9 MG/DL — SIGNIFICANT CHANGE UP (ref 1.6–2.6)
MAGNESIUM SERPL-MCNC: 1.9 MG/DL — SIGNIFICANT CHANGE UP (ref 1.6–2.6)
MAGNESIUM SERPL-MCNC: 2 MG/DL — SIGNIFICANT CHANGE UP (ref 1.6–2.6)
MAGNESIUM SERPL-MCNC: 2.1 MG/DL — SIGNIFICANT CHANGE UP (ref 1.6–2.6)
MAGNESIUM SERPL-MCNC: 2.1 MG/DL — SIGNIFICANT CHANGE UP (ref 1.6–2.6)
MAGNESIUM SERPL-MCNC: 2.2 MG/DL — SIGNIFICANT CHANGE UP (ref 1.6–2.6)
MAGNESIUM SERPL-MCNC: 2.2 MG/DL — SIGNIFICANT CHANGE UP (ref 1.6–2.6)
MANUAL DIF COMMENT BLD-IMP: SIGNIFICANT CHANGE UP
MANUAL DIF COMMENT BLD-IMP: SIGNIFICANT CHANGE UP
MANUAL SMEAR VERIFICATION: SIGNIFICANT CHANGE UP
MCHC RBC-ENTMCNC: 29 PG — SIGNIFICANT CHANGE UP (ref 27–34)
MCHC RBC-ENTMCNC: 29.5 PG — SIGNIFICANT CHANGE UP (ref 27–34)
MCHC RBC-ENTMCNC: 29.5 PG — SIGNIFICANT CHANGE UP (ref 27–34)
MCHC RBC-ENTMCNC: 29.8 PG — SIGNIFICANT CHANGE UP (ref 27–34)
MCHC RBC-ENTMCNC: 29.8 PG — SIGNIFICANT CHANGE UP (ref 27–34)
MCHC RBC-ENTMCNC: 29.9 PG — SIGNIFICANT CHANGE UP (ref 27–34)
MCHC RBC-ENTMCNC: 30 PG — SIGNIFICANT CHANGE UP (ref 27–34)
MCHC RBC-ENTMCNC: 30.2 PG — SIGNIFICANT CHANGE UP (ref 27–34)
MCHC RBC-ENTMCNC: 30.2 PG — SIGNIFICANT CHANGE UP (ref 27–34)
MCHC RBC-ENTMCNC: 30.3 PG — SIGNIFICANT CHANGE UP (ref 27–34)
MCHC RBC-ENTMCNC: 30.5 PG — SIGNIFICANT CHANGE UP (ref 27–34)
MCHC RBC-ENTMCNC: 30.7 G/DL — LOW (ref 32–36)
MCHC RBC-ENTMCNC: 30.7 PG — SIGNIFICANT CHANGE UP (ref 27–34)
MCHC RBC-ENTMCNC: 30.8 PG — SIGNIFICANT CHANGE UP (ref 27–34)
MCHC RBC-ENTMCNC: 31.2 PG — SIGNIFICANT CHANGE UP (ref 27–34)
MCHC RBC-ENTMCNC: 31.5 G/DL — LOW (ref 32–36)
MCHC RBC-ENTMCNC: 31.5 G/DL — LOW (ref 32–36)
MCHC RBC-ENTMCNC: 31.6 PG — SIGNIFICANT CHANGE UP (ref 27–34)
MCHC RBC-ENTMCNC: 31.8 G/DL — LOW (ref 32–36)
MCHC RBC-ENTMCNC: 32 G/DL — SIGNIFICANT CHANGE UP (ref 32–36)
MCHC RBC-ENTMCNC: 32.1 G/DL — SIGNIFICANT CHANGE UP (ref 32–36)
MCHC RBC-ENTMCNC: 32.5 G/DL — SIGNIFICANT CHANGE UP (ref 32–36)
MCHC RBC-ENTMCNC: 32.9 G/DL — SIGNIFICANT CHANGE UP (ref 32–36)
MCHC RBC-ENTMCNC: 33 G/DL — SIGNIFICANT CHANGE UP (ref 32–36)
MCHC RBC-ENTMCNC: 33.2 G/DL — SIGNIFICANT CHANGE UP (ref 32–36)
MCHC RBC-ENTMCNC: 33.3 G/DL — SIGNIFICANT CHANGE UP (ref 32–36)
MCHC RBC-ENTMCNC: 33.7 G/DL — SIGNIFICANT CHANGE UP (ref 32–36)
MCHC RBC-ENTMCNC: 33.9 G/DL — SIGNIFICANT CHANGE UP (ref 32–36)
MCHC RBC-ENTMCNC: 34.2 G/DL — SIGNIFICANT CHANGE UP (ref 32–36)
MCHC RBC-ENTMCNC: 34.3 G/DL — SIGNIFICANT CHANGE UP (ref 32–36)
MCHC RBC-ENTMCNC: 34.4 G/DL — SIGNIFICANT CHANGE UP (ref 32–36)
MCHC RBC-ENTMCNC: 34.6 G/DL — SIGNIFICANT CHANGE UP (ref 32–36)
MCHC RBC-ENTMCNC: 34.9 G/DL — SIGNIFICANT CHANGE UP (ref 32–36)
MCV RBC AUTO: 86.3 FL — SIGNIFICANT CHANGE UP (ref 80–100)
MCV RBC AUTO: 87.1 FL — SIGNIFICANT CHANGE UP (ref 80–100)
MCV RBC AUTO: 87.2 FL — SIGNIFICANT CHANGE UP (ref 80–100)
MCV RBC AUTO: 87.3 FL — SIGNIFICANT CHANGE UP (ref 80–100)
MCV RBC AUTO: 87.6 FL — SIGNIFICANT CHANGE UP (ref 80–100)
MCV RBC AUTO: 89.4 FL — SIGNIFICANT CHANGE UP (ref 80–100)
MCV RBC AUTO: 89.8 FL — SIGNIFICANT CHANGE UP (ref 80–100)
MCV RBC AUTO: 91.4 FL — SIGNIFICANT CHANGE UP (ref 80–100)
MCV RBC AUTO: 93 FL — SIGNIFICANT CHANGE UP (ref 80–100)
MCV RBC AUTO: 93.5 FL — SIGNIFICANT CHANGE UP (ref 80–100)
MCV RBC AUTO: 93.8 FL — SIGNIFICANT CHANGE UP (ref 80–100)
MCV RBC AUTO: 94.2 FL — SIGNIFICANT CHANGE UP (ref 80–100)
MCV RBC AUTO: 94.3 FL — SIGNIFICANT CHANGE UP (ref 80–100)
MCV RBC AUTO: 94.4 FL — SIGNIFICANT CHANGE UP (ref 80–100)
MCV RBC AUTO: 94.5 FL — SIGNIFICANT CHANGE UP (ref 80–100)
MCV RBC AUTO: 94.5 FL — SIGNIFICANT CHANGE UP (ref 80–100)
MCV RBC AUTO: 94.7 FL — SIGNIFICANT CHANGE UP (ref 80–100)
MCV RBC AUTO: 94.9 FL — SIGNIFICANT CHANGE UP (ref 80–100)
MCV RBC AUTO: 95 FL — SIGNIFICANT CHANGE UP (ref 80–100)
MCV RBC AUTO: 95.2 FL — SIGNIFICANT CHANGE UP (ref 80–100)
MONOCYTES # BLD AUTO: 0 K/UL — SIGNIFICANT CHANGE UP (ref 0–0.9)
MONOCYTES # BLD AUTO: 0.87 K/UL — SIGNIFICANT CHANGE UP (ref 0–0.9)
MONOCYTES # BLD AUTO: 107.73 K/UL — HIGH (ref 0–0.9)
MONOCYTES # BLD AUTO: 112.05 K/UL — HIGH (ref 0–0.9)
MONOCYTES # BLD AUTO: 115.26 K/UL — HIGH (ref 0–0.9)
MONOCYTES # BLD AUTO: 2.3 K/UL — HIGH (ref 0–0.9)
MONOCYTES # BLD AUTO: 2.99 K/UL — HIGH (ref 0–0.9)
MONOCYTES # BLD AUTO: 209.15 K/UL — HIGH (ref 0–0.9)
MONOCYTES # BLD AUTO: 60.42 K/UL — HIGH (ref 0–0.9)
MONOCYTES # BLD AUTO: 61.44 K/UL — HIGH (ref 0–0.9)
MONOCYTES # BLD AUTO: 62.46 K/UL — HIGH (ref 0–0.9)
MONOCYTES # BLD AUTO: 63.36 K/UL — HIGH (ref 0–0.9)
MONOCYTES # BLD AUTO: 66.43 K/UL — HIGH (ref 0–0.9)
MONOCYTES # BLD AUTO: 72.21 K/UL — HIGH (ref 0–0.9)
MONOCYTES NFR BLD AUTO: 0 % — LOW (ref 2–14)
MONOCYTES NFR BLD AUTO: 0.7 % — LOW (ref 2–14)
MONOCYTES NFR BLD AUTO: 1 % — LOW (ref 2–14)
MONOCYTES NFR BLD AUTO: 1.8 % — LOW (ref 2–14)
MONOCYTES NFR BLD AUTO: 63.3 % — HIGH (ref 2–14)
MONOCYTES NFR BLD AUTO: 65.5 % — HIGH (ref 2–14)
MONOCYTES NFR BLD AUTO: 69.7 % — HIGH (ref 2–14)
MONOCYTES NFR BLD AUTO: 71.5 % — HIGH (ref 2–14)
MONOCYTES NFR BLD AUTO: 72.8 % — HIGH (ref 2–14)
MONOCYTES NFR BLD AUTO: 75.4 % — HIGH (ref 2–14)
MONOCYTES NFR BLD AUTO: 76.9 % — HIGH (ref 2–14)
MONOCYTES NFR BLD AUTO: 81 % — HIGH (ref 2–14)
MONOCYTES NFR BLD AUTO: 82.7 % — HIGH (ref 2–14)
MONOCYTES NFR BLD AUTO: 84.6 % — HIGH (ref 2–14)
MRSA PCR RESULT.: SIGNIFICANT CHANGE UP
MYELOCYTES NFR BLD: 0.7 % — HIGH (ref 0–0)
NEUTROPHILS # BLD AUTO: 0.62 K/UL — LOW (ref 1.8–7.4)
NEUTROPHILS # BLD AUTO: 0.71 K/UL — LOW (ref 1.8–7.4)
NEUTROPHILS # BLD AUTO: 14.19 K/UL — HIGH (ref 1.8–7.4)
NEUTROPHILS # BLD AUTO: 17.42 K/UL — HIGH (ref 1.8–7.4)
NEUTROPHILS # BLD AUTO: 3.47 K/UL — SIGNIFICANT CHANGE UP (ref 1.8–7.4)
NEUTROPHILS # BLD AUTO: 3.58 K/UL — SIGNIFICANT CHANGE UP (ref 1.8–7.4)
NEUTROPHILS # BLD AUTO: 3.88 K/UL — SIGNIFICANT CHANGE UP (ref 1.8–7.4)
NEUTROPHILS # BLD AUTO: 3.97 K/UL — SIGNIFICANT CHANGE UP (ref 1.8–7.4)
NEUTROPHILS # BLD AUTO: 4.57 K/UL — SIGNIFICANT CHANGE UP (ref 1.8–7.4)
NEUTROPHILS # BLD AUTO: 5.12 K/UL — SIGNIFICANT CHANGE UP (ref 1.8–7.4)
NEUTROPHILS # BLD AUTO: 5.4 K/UL — SIGNIFICANT CHANGE UP (ref 1.8–7.4)
NEUTROPHILS # BLD AUTO: 6.76 K/UL — SIGNIFICANT CHANGE UP (ref 1.8–7.4)
NEUTROPHILS # BLD AUTO: 6.94 K/UL — SIGNIFICANT CHANGE UP (ref 1.8–7.4)
NEUTROPHILS # BLD AUTO: 7.28 K/UL — SIGNIFICANT CHANGE UP (ref 1.8–7.4)
NEUTROPHILS # BLD AUTO: 8.96 K/UL — HIGH (ref 1.8–7.4)
NEUTROPHILS # BLD AUTO: 9.58 K/UL — HIGH (ref 1.8–7.4)
NEUTROPHILS # BLD AUTO: 9.81 K/UL — HIGH (ref 1.8–7.4)
NEUTROPHILS NFR BLD AUTO: 0 % — LOW (ref 43–77)
NEUTROPHILS NFR BLD AUTO: 0.7 % — LOW (ref 43–77)
NEUTROPHILS NFR BLD AUTO: 0.9 % — LOW (ref 43–77)
NEUTROPHILS NFR BLD AUTO: 1.3 % — LOW (ref 43–77)
NEUTROPHILS NFR BLD AUTO: 10.3 % — LOW (ref 43–77)
NEUTROPHILS NFR BLD AUTO: 11 % — LOW (ref 43–77)
NEUTROPHILS NFR BLD AUTO: 12.2 % — LOW (ref 43–77)
NEUTROPHILS NFR BLD AUTO: 16.5 % — LOW (ref 43–77)
NEUTROPHILS NFR BLD AUTO: 2.8 % — LOW (ref 43–77)
NEUTROPHILS NFR BLD AUTO: 3 % — LOW (ref 43–77)
NEUTROPHILS NFR BLD AUTO: 3.3 % — LOW (ref 43–77)
NEUTROPHILS NFR BLD AUTO: 3.4 % — LOW (ref 43–77)
NEUTROPHILS NFR BLD AUTO: 4 % — LOW (ref 43–77)
NEUTROPHILS NFR BLD AUTO: 4 % — LOW (ref 43–77)
NEUTROPHILS NFR BLD AUTO: 6.8 % — LOW (ref 43–77)
NEUTROPHILS NFR BLD AUTO: 7.9 % — LOW (ref 43–77)
NEUTROPHILS NFR BLD AUTO: 9.3 % — LOW (ref 43–77)
NEUTS BAND # BLD: 0.7 % — SIGNIFICANT CHANGE UP (ref 0–8)
NEUTS BAND # BLD: 1.9 % — SIGNIFICANT CHANGE UP (ref 0–8)
NITRITE UR-MCNC: NEGATIVE — SIGNIFICANT CHANGE UP
NRBC # BLD: 0 /100 WBCS — SIGNIFICANT CHANGE UP (ref 0–0)
NRBC # BLD: 1 /100 WBCS — HIGH (ref 0–0)
NRBC # BLD: 2 /100 WBCS — HIGH (ref 0–0)
NT-PROBNP SERPL-SCNC: 1015 PG/ML — HIGH (ref 0–450)
ONKOSIGHT MYELOID SEQUENCE: (no result)
OSMOLALITY UR: 353 MOS/KG — SIGNIFICANT CHANGE UP (ref 300–900)
OVALOCYTES BLD QL SMEAR: SLIGHT — SIGNIFICANT CHANGE UP
PH UR: 5 — SIGNIFICANT CHANGE UP (ref 5–8)
PH UR: 5 — SIGNIFICANT CHANGE UP (ref 5–8)
PH UR: 5.5 — SIGNIFICANT CHANGE UP (ref 5–8)
PH UR: 6.5 — SIGNIFICANT CHANGE UP (ref 5–8)
PHOSPHATE SERPL-MCNC: 2 MG/DL — LOW (ref 2.5–4.5)
PHOSPHATE SERPL-MCNC: 2.4 MG/DL — LOW (ref 2.5–4.5)
PHOSPHATE SERPL-MCNC: 2.5 MG/DL — SIGNIFICANT CHANGE UP (ref 2.5–4.5)
PHOSPHATE SERPL-MCNC: 2.6 MG/DL — SIGNIFICANT CHANGE UP (ref 2.5–4.5)
PHOSPHATE SERPL-MCNC: 2.7 MG/DL — SIGNIFICANT CHANGE UP (ref 2.5–4.5)
PHOSPHATE SERPL-MCNC: 2.8 MG/DL — SIGNIFICANT CHANGE UP (ref 2.5–4.5)
PHOSPHATE SERPL-MCNC: 2.9 MG/DL — SIGNIFICANT CHANGE UP (ref 2.5–4.5)
PHOSPHATE SERPL-MCNC: 3.1 MG/DL — SIGNIFICANT CHANGE UP (ref 2.5–4.5)
PHOSPHATE SERPL-MCNC: 3.4 MG/DL — SIGNIFICANT CHANGE UP (ref 2.5–4.5)
PHOSPHATE SERPL-MCNC: 3.8 MG/DL — SIGNIFICANT CHANGE UP (ref 2.5–4.5)
PHOSPHATE SERPL-MCNC: 3.9 MG/DL — SIGNIFICANT CHANGE UP (ref 2.5–4.5)
PHOSPHATE SERPL-MCNC: 4 MG/DL — SIGNIFICANT CHANGE UP (ref 2.5–4.5)
PHOSPHATE SERPL-MCNC: 4.2 MG/DL — SIGNIFICANT CHANGE UP (ref 2.5–4.5)
PHOSPHATE SERPL-MCNC: 5.3 MG/DL — HIGH (ref 2.5–4.5)
PHOSPHATE SERPL-MCNC: 6 MG/DL — HIGH (ref 2.5–4.5)
PLAT MORPH BLD: NORMAL — SIGNIFICANT CHANGE UP
PLATELET # BLD AUTO: 13 K/UL — CRITICAL LOW (ref 150–400)
PLATELET # BLD AUTO: 14 K/UL — CRITICAL LOW (ref 150–400)
PLATELET # BLD AUTO: 16 K/UL — CRITICAL LOW (ref 150–400)
PLATELET # BLD AUTO: 21 K/UL — LOW (ref 150–400)
PLATELET # BLD AUTO: 22 K/UL — LOW (ref 150–400)
PLATELET # BLD AUTO: 23 K/UL — LOW (ref 150–400)
PLATELET # BLD AUTO: 32 K/UL — LOW (ref 150–400)
PLATELET # BLD AUTO: 35 K/UL — LOW (ref 150–400)
PLATELET # BLD AUTO: 36 K/UL — LOW (ref 150–400)
PLATELET # BLD AUTO: 38 K/UL — LOW (ref 150–400)
PLATELET # BLD AUTO: 39 K/UL — LOW (ref 150–400)
PLATELET # BLD AUTO: 42 K/UL — LOW (ref 150–400)
PLATELET # BLD AUTO: 43 K/UL — LOW (ref 150–400)
PLATELET # BLD AUTO: 51 K/UL — LOW (ref 150–400)
PLATELET # BLD AUTO: 55 K/UL — LOW (ref 150–400)
PLATELET # BLD AUTO: 75 K/UL — LOW (ref 150–400)
POIKILOCYTOSIS BLD QL AUTO: SLIGHT — SIGNIFICANT CHANGE UP
POTASSIUM SERPL-MCNC: 2.4 MMOL/L — CRITICAL LOW (ref 3.5–5.3)
POTASSIUM SERPL-MCNC: 2.6 MMOL/L — CRITICAL LOW (ref 3.5–5.3)
POTASSIUM SERPL-MCNC: 3 MMOL/L — LOW (ref 3.5–5.3)
POTASSIUM SERPL-MCNC: 3 MMOL/L — LOW (ref 3.5–5.3)
POTASSIUM SERPL-MCNC: 3.4 MMOL/L — LOW (ref 3.5–5.3)
POTASSIUM SERPL-MCNC: 3.6 MMOL/L — SIGNIFICANT CHANGE UP (ref 3.5–5.3)
POTASSIUM SERPL-MCNC: 3.6 MMOL/L — SIGNIFICANT CHANGE UP (ref 3.5–5.3)
POTASSIUM SERPL-MCNC: 3.7 MMOL/L — SIGNIFICANT CHANGE UP (ref 3.5–5.3)
POTASSIUM SERPL-MCNC: 4.3 MMOL/L — SIGNIFICANT CHANGE UP (ref 3.5–5.3)
POTASSIUM SERPL-MCNC: 4.5 MMOL/L — SIGNIFICANT CHANGE UP (ref 3.5–5.3)
POTASSIUM SERPL-MCNC: 4.8 MMOL/L — SIGNIFICANT CHANGE UP (ref 3.5–5.3)
POTASSIUM SERPL-MCNC: 4.9 MMOL/L — SIGNIFICANT CHANGE UP (ref 3.5–5.3)
POTASSIUM SERPL-MCNC: 5 MMOL/L — SIGNIFICANT CHANGE UP (ref 3.5–5.3)
POTASSIUM SERPL-MCNC: 5.1 MMOL/L — SIGNIFICANT CHANGE UP (ref 3.5–5.3)
POTASSIUM SERPL-MCNC: 5.5 MMOL/L — HIGH (ref 3.5–5.3)
POTASSIUM SERPL-SCNC: 2.4 MMOL/L — CRITICAL LOW (ref 3.5–5.3)
POTASSIUM SERPL-SCNC: 2.6 MMOL/L — CRITICAL LOW (ref 3.5–5.3)
POTASSIUM SERPL-SCNC: 3 MMOL/L — LOW (ref 3.5–5.3)
POTASSIUM SERPL-SCNC: 3 MMOL/L — LOW (ref 3.5–5.3)
POTASSIUM SERPL-SCNC: 3.4 MMOL/L — LOW (ref 3.5–5.3)
POTASSIUM SERPL-SCNC: 3.6 MMOL/L — SIGNIFICANT CHANGE UP (ref 3.5–5.3)
POTASSIUM SERPL-SCNC: 3.6 MMOL/L — SIGNIFICANT CHANGE UP (ref 3.5–5.3)
POTASSIUM SERPL-SCNC: 3.7 MMOL/L — SIGNIFICANT CHANGE UP (ref 3.5–5.3)
POTASSIUM SERPL-SCNC: 4.3 MMOL/L — SIGNIFICANT CHANGE UP (ref 3.5–5.3)
POTASSIUM SERPL-SCNC: 4.5 MMOL/L — SIGNIFICANT CHANGE UP (ref 3.5–5.3)
POTASSIUM SERPL-SCNC: 4.8 MMOL/L — SIGNIFICANT CHANGE UP (ref 3.5–5.3)
POTASSIUM SERPL-SCNC: 4.9 MMOL/L — SIGNIFICANT CHANGE UP (ref 3.5–5.3)
POTASSIUM SERPL-SCNC: 5 MMOL/L — SIGNIFICANT CHANGE UP (ref 3.5–5.3)
POTASSIUM SERPL-SCNC: 5.1 MMOL/L — SIGNIFICANT CHANGE UP (ref 3.5–5.3)
POTASSIUM SERPL-SCNC: 5.5 MMOL/L — HIGH (ref 3.5–5.3)
POTASSIUM UR-SCNC: 44 MMOL/L — SIGNIFICANT CHANGE UP
PROT ?TM UR-MCNC: 144 MG/DL — HIGH (ref 0–12)
PROT SERPL-MCNC: 4.5 G/DL — LOW (ref 6–8.3)
PROT SERPL-MCNC: 6.2 G/DL — SIGNIFICANT CHANGE UP (ref 6–8.3)
PROT SERPL-MCNC: 6.6 G/DL — SIGNIFICANT CHANGE UP (ref 6–8.3)
PROT SERPL-MCNC: 6.6 G/DL — SIGNIFICANT CHANGE UP (ref 6–8.3)
PROT SERPL-MCNC: 6.7 G/DL — SIGNIFICANT CHANGE UP (ref 6–8.3)
PROT SERPL-MCNC: 6.7 G/DL — SIGNIFICANT CHANGE UP (ref 6–8.3)
PROT SERPL-MCNC: 7 G/DL — SIGNIFICANT CHANGE UP (ref 6–8.3)
PROT SERPL-MCNC: 7 G/DL — SIGNIFICANT CHANGE UP (ref 6–8.3)
PROT SERPL-MCNC: 7.4 G/DL — SIGNIFICANT CHANGE UP (ref 6–8.3)
PROT SERPL-MCNC: 7.7 G/DL — SIGNIFICANT CHANGE UP (ref 6–8.3)
PROT SERPL-MCNC: 7.7 G/DL — SIGNIFICANT CHANGE UP (ref 6–8.3)
PROT SERPL-MCNC: 7.8 G/DL — SIGNIFICANT CHANGE UP (ref 6–8.3)
PROT SERPL-MCNC: 7.9 G/DL — SIGNIFICANT CHANGE UP (ref 6–8.3)
PROT SERPL-MCNC: 8 G/DL — SIGNIFICANT CHANGE UP (ref 6–8.3)
PROT SERPL-MCNC: 8.1 G/DL — SIGNIFICANT CHANGE UP (ref 6–8.3)
PROT SERPL-MCNC: 8.1 G/DL — SIGNIFICANT CHANGE UP (ref 6–8.3)
PROT SERPL-MCNC: 8.2 G/DL — SIGNIFICANT CHANGE UP (ref 6–8.3)
PROT SERPL-MCNC: 8.5 G/DL — HIGH (ref 6–8.3)
PROT SERPL-MCNC: 8.6 G/DL — HIGH (ref 6–8.3)
PROT UR-MCNC: 100 MG/DL
PROT UR-MCNC: 30 MG/DL
PROT UR-MCNC: 30 MG/DL
PROT UR-MCNC: SIGNIFICANT CHANGE UP MG/DL
PROT/CREAT UR-RTO: 2 RATIO — HIGH (ref 0–0.2)
PROTHROM AB SERPL-ACNC: 12.9 SEC — SIGNIFICANT CHANGE UP (ref 9.9–13.4)
PROTHROM AB SERPL-ACNC: 13 SEC — SIGNIFICANT CHANGE UP (ref 9.9–13.4)
PROTHROM AB SERPL-ACNC: 13.1 SEC — SIGNIFICANT CHANGE UP (ref 9.9–13.4)
PROTHROM AB SERPL-ACNC: 13.5 SEC — HIGH (ref 9.9–13.4)
PROTHROM AB SERPL-ACNC: 13.6 SEC — HIGH (ref 9.9–13.4)
PROTHROM AB SERPL-ACNC: 13.8 SEC — HIGH (ref 9.9–13.4)
PROTHROM AB SERPL-ACNC: 14.5 SEC — HIGH (ref 9.9–13.4)
PROTHROM AB SERPL-ACNC: 14.5 SEC — HIGH (ref 9.9–13.4)
PROTHROM AB SERPL-ACNC: 15.8 SEC — HIGH (ref 9.9–13.4)
PROTHROM AB SERPL-ACNC: 16.1 SEC — HIGH (ref 9.9–13.4)
PROTHROM AB SERPL-ACNC: 16.7 SEC — HIGH (ref 9.9–13.4)
PROTHROM AB SERPL-ACNC: 16.9 SEC — HIGH (ref 9.9–13.4)
RAPIDTEG MAXIMUM AMPLITUDE: 42.9 MM — LOW (ref 52–70)
RBC # BLD: 1.79 M/UL — LOW (ref 3.8–5.2)
RBC # BLD: 1.87 M/UL — LOW (ref 3.8–5.2)
RBC # BLD: 2.15 M/UL — LOW (ref 3.8–5.2)
RBC # BLD: 2.34 M/UL — LOW (ref 3.8–5.2)
RBC # BLD: 2.46 M/UL — LOW (ref 3.8–5.2)
RBC # BLD: 2.58 M/UL — LOW (ref 3.8–5.2)
RBC # BLD: 2.61 M/UL — LOW (ref 3.8–5.2)
RBC # BLD: 2.63 M/UL — LOW (ref 3.8–5.2)
RBC # BLD: 2.64 M/UL — LOW (ref 3.8–5.2)
RBC # BLD: 2.66 M/UL — LOW (ref 3.8–5.2)
RBC # BLD: 2.71 M/UL — LOW (ref 3.8–5.2)
RBC # BLD: 2.71 M/UL — LOW (ref 3.8–5.2)
RBC # BLD: 2.75 M/UL — LOW (ref 3.8–5.2)
RBC # BLD: 2.82 M/UL — LOW (ref 3.8–5.2)
RBC # BLD: 2.93 M/UL — LOW (ref 3.8–5.2)
RBC # BLD: 2.95 M/UL — LOW (ref 3.8–5.2)
RBC # BLD: 2.95 M/UL — LOW (ref 3.8–5.2)
RBC # BLD: 3.08 M/UL — LOW (ref 3.8–5.2)
RBC # BLD: 3.09 M/UL — LOW (ref 3.8–5.2)
RBC # BLD: 3.1 M/UL — LOW (ref 3.8–5.2)
RBC # FLD: 16.2 % — HIGH (ref 10.3–14.5)
RBC # FLD: 16.4 % — HIGH (ref 10.3–14.5)
RBC # FLD: 16.5 % — HIGH (ref 10.3–14.5)
RBC # FLD: 16.6 % — HIGH (ref 10.3–14.5)
RBC # FLD: 16.6 % — HIGH (ref 10.3–14.5)
RBC # FLD: 17.1 % — HIGH (ref 10.3–14.5)
RBC # FLD: 17.2 % — HIGH (ref 10.3–14.5)
RBC # FLD: 17.3 % — HIGH (ref 10.3–14.5)
RBC # FLD: 17.4 % — HIGH (ref 10.3–14.5)
RBC # FLD: 17.4 % — HIGH (ref 10.3–14.5)
RBC # FLD: 17.5 % — HIGH (ref 10.3–14.5)
RBC # FLD: 17.6 % — HIGH (ref 10.3–14.5)
RBC # FLD: 17.7 % — HIGH (ref 10.3–14.5)
RBC BLD AUTO: ABNORMAL
RBC BLD AUTO: NORMAL — SIGNIFICANT CHANGE UP
RBC BLD AUTO: SIGNIFICANT CHANGE UP
RBC CASTS # UR COMP ASSIST: 15 /HPF — HIGH (ref 0–4)
RBC CASTS # UR COMP ASSIST: 2 /HPF — SIGNIFICANT CHANGE UP (ref 0–4)
RBC CASTS # UR COMP ASSIST: 85 /HPF — HIGH (ref 0–4)
REVIEW: SIGNIFICANT CHANGE UP
REVIEW: SIGNIFICANT CHANGE UP
RH IG SCN BLD-IMP: POSITIVE — SIGNIFICANT CHANGE UP
RSV RNA NPH QL NAA+NON-PROBE: SIGNIFICANT CHANGE UP
RSV RNA NPH QL NAA+NON-PROBE: SIGNIFICANT CHANGE UP
S AUREUS DNA NOSE QL NAA+PROBE: SIGNIFICANT CHANGE UP
SARS-COV-2 RNA SPEC QL NAA+PROBE: SIGNIFICANT CHANGE UP
SARS-COV-2 RNA SPEC QL NAA+PROBE: SIGNIFICANT CHANGE UP
SCHISTOCYTES BLD QL AUTO: SLIGHT — SIGNIFICANT CHANGE UP
SMUDGE CELLS # BLD: PRESENT — SIGNIFICANT CHANGE UP
SODIUM SERPL-SCNC: 116 MMOL/L — CRITICAL LOW (ref 135–145)
SODIUM SERPL-SCNC: 122 MMOL/L — LOW (ref 135–145)
SODIUM SERPL-SCNC: 122 MMOL/L — LOW (ref 135–145)
SODIUM SERPL-SCNC: 123 MMOL/L — LOW (ref 135–145)
SODIUM SERPL-SCNC: 123 MMOL/L — LOW (ref 135–145)
SODIUM SERPL-SCNC: 124 MMOL/L — LOW (ref 135–145)
SODIUM SERPL-SCNC: 130 MMOL/L — LOW (ref 135–145)
SODIUM SERPL-SCNC: 131 MMOL/L — LOW (ref 135–145)
SODIUM SERPL-SCNC: 136 MMOL/L — SIGNIFICANT CHANGE UP (ref 135–145)
SODIUM SERPL-SCNC: 136 MMOL/L — SIGNIFICANT CHANGE UP (ref 135–145)
SODIUM SERPL-SCNC: 137 MMOL/L — SIGNIFICANT CHANGE UP (ref 135–145)
SODIUM SERPL-SCNC: 138 MMOL/L — SIGNIFICANT CHANGE UP (ref 135–145)
SODIUM SERPL-SCNC: 139 MMOL/L — SIGNIFICANT CHANGE UP (ref 135–145)
SODIUM SERPL-SCNC: 140 MMOL/L — SIGNIFICANT CHANGE UP (ref 135–145)
SODIUM SERPL-SCNC: 142 MMOL/L — SIGNIFICANT CHANGE UP (ref 135–145)
SODIUM UR-SCNC: 17 MMOL/L — SIGNIFICANT CHANGE UP
SP GR SPEC: 1.01 — SIGNIFICANT CHANGE UP (ref 1–1.03)
SP GR SPEC: 1.01 — SIGNIFICANT CHANGE UP (ref 1–1.03)
SP GR SPEC: 1.02 — SIGNIFICANT CHANGE UP (ref 1–1.03)
SP GR SPEC: 1.03 — HIGH (ref 1–1.03)
SPECIMEN SOURCE: SIGNIFICANT CHANGE UP
SQUAMOUS # UR AUTO: 1 /HPF — SIGNIFICANT CHANGE UP (ref 0–5)
SQUAMOUS # UR AUTO: 2 /HPF — SIGNIFICANT CHANGE UP (ref 0–5)
STOMATOCYTES BLD QL SMEAR: SLIGHT — SIGNIFICANT CHANGE UP
TEG FUNCTIONAL FIBRINOGEN: 19.4 MM — SIGNIFICANT CHANGE UP (ref 15–32)
TEG MAXIMUM AMPLITUDE: 47.4 MM — LOW (ref 52–69)
TEG REACTION TIME: 8.9 MIN — SIGNIFICANT CHANGE UP (ref 4.6–9.1)
TIBC SERPL-MCNC: 283 UG/DL — SIGNIFICANT CHANGE UP (ref 220–430)
TM INTERPRETATION: SIGNIFICANT CHANGE UP
TROPONIN I, HIGH SENSITIVITY RESULT: 143.6 NG/L — HIGH
TROPONIN T, HIGH SENSITIVITY RESULT: 86 NG/L — HIGH (ref 0–51)
UIBC SERPL-MCNC: 151 UG/DL — SIGNIFICANT CHANGE UP (ref 110–370)
URATE SERPL-MCNC: 0.6 MG/DL — LOW (ref 2.5–7)
URATE SERPL-MCNC: 0.7 MG/DL — LOW (ref 2.5–7)
URATE SERPL-MCNC: 1.4 MG/DL — LOW (ref 2.5–7)
URATE SERPL-MCNC: 1.6 MG/DL — LOW (ref 2.5–7)
URATE SERPL-MCNC: 1.8 MG/DL — LOW (ref 2.5–7)
URATE SERPL-MCNC: 2 MG/DL — LOW (ref 2.5–7)
URATE SERPL-MCNC: 2 MG/DL — LOW (ref 2.5–7)
URATE SERPL-MCNC: 2.2 MG/DL — LOW (ref 2.5–7)
URATE SERPL-MCNC: 2.4 MG/DL — LOW (ref 2.5–7)
URATE SERPL-MCNC: 3.1 MG/DL — SIGNIFICANT CHANGE UP (ref 2.5–7)
URATE SERPL-MCNC: 3.8 MG/DL — SIGNIFICANT CHANGE UP (ref 2.5–7)
URATE SERPL-MCNC: 4 MG/DL — SIGNIFICANT CHANGE UP (ref 2.5–7)
URATE SERPL-MCNC: 4.4 MG/DL — SIGNIFICANT CHANGE UP (ref 2.5–7)
URATE SERPL-MCNC: 8.3 MG/DL — HIGH (ref 2.5–7)
URATE SERPL-MCNC: 9.1 MG/DL — HIGH (ref 2.5–7)
URATE SERPL-MCNC: 9.7 MG/DL — HIGH (ref 2.5–7)
UROBILINOGEN FLD QL: 0.2 MG/DL — SIGNIFICANT CHANGE UP (ref 0.2–1)
UROBILINOGEN FLD QL: 1 MG/DL — SIGNIFICANT CHANGE UP (ref 0.2–1)
UUN UR-MCNC: 343 MG/DL — SIGNIFICANT CHANGE UP
VARIANT LYMPHS # BLD: 17 % — HIGH (ref 0–6)
VIT B12 SERPL-MCNC: 1397 PG/ML — HIGH (ref 232–1245)
WBC # BLD: 101.58 K/UL — CRITICAL HIGH (ref 3.8–10.5)
WBC # BLD: 123.87 K/UL — CRITICAL HIGH (ref 3.8–10.5)
WBC # BLD: 127.91 K/UL — CRITICAL HIGH (ref 3.8–10.5)
WBC # BLD: 138.26 K/UL — CRITICAL HIGH (ref 3.8–10.5)
WBC # BLD: 138.5 K/UL — CRITICAL HIGH (ref 3.8–10.5)
WBC # BLD: 142.95 K/UL — CRITICAL HIGH (ref 3.8–10.5)
WBC # BLD: 160.76 K/UL — CRITICAL HIGH (ref 3.8–10.5)
WBC # BLD: 182.16 K/UL — CRITICAL HIGH (ref 3.8–10.5)
WBC # BLD: 280.33 K/UL — CRITICAL HIGH (ref 3.8–10.5)
WBC # BLD: 287.44 K/UL — CRITICAL HIGH (ref 3.8–10.5)
WBC # BLD: 298.64 K/UL — CRITICAL HIGH (ref 3.8–10.5)
WBC # BLD: 57.45 K/UL — CRITICAL HIGH (ref 3.8–10.5)
WBC # BLD: 74.58 K/UL — CRITICAL HIGH (ref 3.8–10.5)
WBC # BLD: 74.93 K/UL — CRITICAL HIGH (ref 3.8–10.5)
WBC # BLD: 85.91 K/UL — CRITICAL HIGH (ref 3.8–10.5)
WBC # BLD: 86.38 K/UL — CRITICAL HIGH (ref 3.8–10.5)
WBC # BLD: 87.3 K/UL — CRITICAL HIGH (ref 3.8–10.5)
WBC # BLD: 87.92 K/UL — CRITICAL HIGH (ref 3.8–10.5)
WBC # BLD: 95.33 K/UL — CRITICAL HIGH (ref 3.8–10.5)
WBC # BLD: 99.32 K/UL — CRITICAL HIGH (ref 3.8–10.5)
WBC # FLD AUTO: 101.58 K/UL — CRITICAL HIGH (ref 3.8–10.5)
WBC # FLD AUTO: 123.87 K/UL — CRITICAL HIGH (ref 3.8–10.5)
WBC # FLD AUTO: 127.91 K/UL — CRITICAL HIGH (ref 3.8–10.5)
WBC # FLD AUTO: 138.26 K/UL — CRITICAL HIGH (ref 3.8–10.5)
WBC # FLD AUTO: 138.5 K/UL — CRITICAL HIGH (ref 3.8–10.5)
WBC # FLD AUTO: 142.95 K/UL — CRITICAL HIGH (ref 3.8–10.5)
WBC # FLD AUTO: 160.76 K/UL — CRITICAL HIGH (ref 3.8–10.5)
WBC # FLD AUTO: 182.16 K/UL — CRITICAL HIGH (ref 3.8–10.5)
WBC # FLD AUTO: 280.33 K/UL — CRITICAL HIGH (ref 3.8–10.5)
WBC # FLD AUTO: 287.44 K/UL — CRITICAL HIGH (ref 3.8–10.5)
WBC # FLD AUTO: 298.64 K/UL — CRITICAL HIGH (ref 3.8–10.5)
WBC # FLD AUTO: 57.45 K/UL — CRITICAL HIGH (ref 3.8–10.5)
WBC # FLD AUTO: 74.58 K/UL — CRITICAL HIGH (ref 3.8–10.5)
WBC # FLD AUTO: 74.93 K/UL — CRITICAL HIGH (ref 3.8–10.5)
WBC # FLD AUTO: 85.91 K/UL — CRITICAL HIGH (ref 3.8–10.5)
WBC # FLD AUTO: 86.38 K/UL — CRITICAL HIGH (ref 3.8–10.5)
WBC # FLD AUTO: 87.3 K/UL — CRITICAL HIGH (ref 3.8–10.5)
WBC # FLD AUTO: 87.92 K/UL — CRITICAL HIGH (ref 3.8–10.5)
WBC # FLD AUTO: 95.33 K/UL — CRITICAL HIGH (ref 3.8–10.5)
WBC # FLD AUTO: 99.32 K/UL — CRITICAL HIGH (ref 3.8–10.5)
WBC UR QL: 0 /HPF — SIGNIFICANT CHANGE UP (ref 0–5)
WBC UR QL: 1 /HPF — SIGNIFICANT CHANGE UP (ref 0–5)
WBC UR QL: 32 /HPF — HIGH (ref 0–5)

## 2025-01-01 PROCEDURE — 71046 X-RAY EXAM CHEST 2 VIEWS: CPT | Mod: 26

## 2025-01-01 PROCEDURE — 85384 FIBRINOGEN ACTIVITY: CPT

## 2025-01-01 PROCEDURE — 99291 CRITICAL CARE FIRST HOUR: CPT | Mod: 25

## 2025-01-01 PROCEDURE — 99221 1ST HOSP IP/OBS SF/LOW 40: CPT | Mod: 25

## 2025-01-01 PROCEDURE — 74177 CT ABD & PELVIS W/CONTRAST: CPT | Mod: MC

## 2025-01-01 PROCEDURE — 88341 IMHCHEM/IMCYTCHM EA ADD ANTB: CPT

## 2025-01-01 PROCEDURE — 36569 INSJ PICC 5 YR+ W/O IMAGING: CPT

## 2025-01-01 PROCEDURE — 87637 SARSCOV2&INF A&B&RSV AMP PRB: CPT

## 2025-01-01 PROCEDURE — 85610 PROTHROMBIN TIME: CPT

## 2025-01-01 PROCEDURE — P9012: CPT

## 2025-01-01 PROCEDURE — 99292 CRITICAL CARE ADDL 30 MIN: CPT

## 2025-01-01 PROCEDURE — 97116 GAIT TRAINING THERAPY: CPT

## 2025-01-01 PROCEDURE — 36511 APHERESIS WBC: CPT

## 2025-01-01 PROCEDURE — 85018 HEMOGLOBIN: CPT

## 2025-01-01 PROCEDURE — 82550 ASSAY OF CK (CPK): CPT

## 2025-01-01 PROCEDURE — 88264 CHROMOSOME ANALYSIS 20-25: CPT

## 2025-01-01 PROCEDURE — 93308 TTE F-UP OR LMTD: CPT | Mod: 26,GC

## 2025-01-01 PROCEDURE — 85379 FIBRIN DEGRADATION QUANT: CPT

## 2025-01-01 PROCEDURE — 99284 EMERGENCY DEPT VISIT MOD MDM: CPT | Mod: GC

## 2025-01-01 PROCEDURE — 84540 ASSAY OF URINE/UREA-N: CPT

## 2025-01-01 PROCEDURE — 71250 CT THORAX DX C-: CPT | Mod: 26

## 2025-01-01 PROCEDURE — 83540 ASSAY OF IRON: CPT

## 2025-01-01 PROCEDURE — 76604 US EXAM CHEST: CPT | Mod: 26,GC

## 2025-01-01 PROCEDURE — 85060 BLOOD SMEAR INTERPRETATION: CPT

## 2025-01-01 PROCEDURE — 83880 ASSAY OF NATRIURETIC PEPTIDE: CPT

## 2025-01-01 PROCEDURE — 85097 BONE MARROW INTERPRETATION: CPT

## 2025-01-01 PROCEDURE — 86901 BLOOD TYPING SEROLOGIC RH(D): CPT

## 2025-01-01 PROCEDURE — 93010 ELECTROCARDIOGRAM REPORT: CPT

## 2025-01-01 PROCEDURE — 99233 SBSQ HOSP IP/OBS HIGH 50: CPT

## 2025-01-01 PROCEDURE — 71045 X-RAY EXAM CHEST 1 VIEW: CPT | Mod: 26

## 2025-01-01 PROCEDURE — 88341 IMHCHEM/IMCYTCHM EA ADD ANTB: CPT | Mod: 26,59

## 2025-01-01 PROCEDURE — 81003 URINALYSIS AUTO W/O SCOPE: CPT

## 2025-01-01 PROCEDURE — 81001 URINALYSIS AUTO W/SCOPE: CPT

## 2025-01-01 PROCEDURE — 87040 BLOOD CULTURE FOR BACTERIA: CPT

## 2025-01-01 PROCEDURE — 88271 CYTOGENETICS DNA PROBE: CPT

## 2025-01-01 PROCEDURE — 85730 THROMBOPLASTIN TIME PARTIAL: CPT

## 2025-01-01 PROCEDURE — P9073: CPT

## 2025-01-01 PROCEDURE — 99291 CRITICAL CARE FIRST HOUR: CPT | Mod: GC,25

## 2025-01-01 PROCEDURE — 82962 GLUCOSE BLOOD TEST: CPT

## 2025-01-01 PROCEDURE — 88342 IMHCHEM/IMCYTCHM 1ST ANTB: CPT

## 2025-01-01 PROCEDURE — 84484 ASSAY OF TROPONIN QUANT: CPT

## 2025-01-01 PROCEDURE — 71046 X-RAY EXAM CHEST 2 VIEWS: CPT

## 2025-01-01 PROCEDURE — 96375 TX/PRO/DX INJ NEW DRUG ADDON: CPT

## 2025-01-01 PROCEDURE — 84100 ASSAY OF PHOSPHORUS: CPT

## 2025-01-01 PROCEDURE — 81451 HL NEO GSAP 5-50 RNA ALYS: CPT

## 2025-01-01 PROCEDURE — 88342 IMHCHEM/IMCYTCHM 1ST ANTB: CPT | Mod: 26,59

## 2025-01-01 PROCEDURE — 85027 COMPLETE CBC AUTOMATED: CPT

## 2025-01-01 PROCEDURE — 88291 CYTO/MOLECULAR REPORT: CPT

## 2025-01-01 PROCEDURE — 97162 PT EVAL MOD COMPLEX 30 MIN: CPT

## 2025-01-01 PROCEDURE — 86965 POOLING BLOOD PLATELETS: CPT

## 2025-01-01 PROCEDURE — 84133 ASSAY OF URINE POTASSIUM: CPT

## 2025-01-01 PROCEDURE — 83605 ASSAY OF LACTIC ACID: CPT

## 2025-01-01 PROCEDURE — 88313 SPECIAL STAINS GROUP 2: CPT

## 2025-01-01 PROCEDURE — 93005 ELECTROCARDIOGRAM TRACING: CPT

## 2025-01-01 PROCEDURE — 99232 SBSQ HOSP IP/OBS MODERATE 35: CPT

## 2025-01-01 PROCEDURE — 83550 IRON BINDING TEST: CPT

## 2025-01-01 PROCEDURE — 99291 CRITICAL CARE FIRST HOUR: CPT

## 2025-01-01 PROCEDURE — 93971 EXTREMITY STUDY: CPT | Mod: 26,LT

## 2025-01-01 PROCEDURE — G0452: CPT | Mod: 26,XP

## 2025-01-01 PROCEDURE — 83036 HEMOGLOBIN GLYCOSYLATED A1C: CPT

## 2025-01-01 PROCEDURE — 82570 ASSAY OF URINE CREATININE: CPT

## 2025-01-01 PROCEDURE — 82607 VITAMIN B-12: CPT

## 2025-01-01 PROCEDURE — 81450 HL NEO GSAP 5-50DNA/DNA&RNA: CPT

## 2025-01-01 PROCEDURE — 81246 FLT3 GENE ANALYSIS: CPT

## 2025-01-01 PROCEDURE — 88184 FLOWCYTOMETRY/ TC 1 MARKER: CPT

## 2025-01-01 PROCEDURE — 70450 CT HEAD/BRAIN W/O DYE: CPT | Mod: 26

## 2025-01-01 PROCEDURE — 81206 BCR/ABL1 GENE MAJOR BP: CPT

## 2025-01-01 PROCEDURE — 99285 EMERGENCY DEPT VISIT HI MDM: CPT | Mod: 25

## 2025-01-01 PROCEDURE — 88280 CHROMOSOME KARYOTYPE STUDY: CPT

## 2025-01-01 PROCEDURE — 80074 ACUTE HEPATITIS PANEL: CPT

## 2025-01-01 PROCEDURE — P9037: CPT

## 2025-01-01 PROCEDURE — 82330 ASSAY OF CALCIUM: CPT

## 2025-01-01 PROCEDURE — 84132 ASSAY OF SERUM POTASSIUM: CPT

## 2025-01-01 PROCEDURE — 83690 ASSAY OF LIPASE: CPT

## 2025-01-01 PROCEDURE — 85396 CLOTTING ASSAY WHOLE BLOOD: CPT

## 2025-01-01 PROCEDURE — 36415 COLL VENOUS BLD VENIPUNCTURE: CPT

## 2025-01-01 PROCEDURE — 82435 ASSAY OF BLOOD CHLORIDE: CPT

## 2025-01-01 PROCEDURE — 99232 SBSQ HOSP IP/OBS MODERATE 35: CPT | Mod: GC

## 2025-01-01 PROCEDURE — 97166 OT EVAL MOD COMPLEX 45 MIN: CPT

## 2025-01-01 PROCEDURE — 81207 BCR/ABL1 GENE MINOR BP: CPT

## 2025-01-01 PROCEDURE — 88313 SPECIAL STAINS GROUP 2: CPT | Mod: 26

## 2025-01-01 PROCEDURE — 87641 MR-STAPH DNA AMP PROBE: CPT

## 2025-01-01 PROCEDURE — 96374 THER/PROPH/DIAG INJ IV PUSH: CPT

## 2025-01-01 PROCEDURE — 88360 TUMOR IMMUNOHISTOCHEM/MANUAL: CPT

## 2025-01-01 PROCEDURE — 99285 EMERGENCY DEPT VISIT HI MDM: CPT

## 2025-01-01 PROCEDURE — 97530 THERAPEUTIC ACTIVITIES: CPT

## 2025-01-01 PROCEDURE — P9041: CPT

## 2025-01-01 PROCEDURE — 71045 X-RAY EXAM CHEST 1 VIEW: CPT | Mod: 26,XE

## 2025-01-01 PROCEDURE — 82010 KETONE BODYS QUAN: CPT

## 2025-01-01 PROCEDURE — 86923 COMPATIBILITY TEST ELECTRIC: CPT

## 2025-01-01 PROCEDURE — 84550 ASSAY OF BLOOD/URIC ACID: CPT

## 2025-01-01 PROCEDURE — 87086 URINE CULTURE/COLONY COUNT: CPT

## 2025-01-01 PROCEDURE — 88305 TISSUE EXAM BY PATHOLOGIST: CPT

## 2025-01-01 PROCEDURE — 85014 HEMATOCRIT: CPT

## 2025-01-01 PROCEDURE — 87205 SMEAR GRAM STAIN: CPT

## 2025-01-01 PROCEDURE — 99233 SBSQ HOSP IP/OBS HIGH 50: CPT | Mod: GC

## 2025-01-01 PROCEDURE — 88275 CYTOGENETICS 100-300: CPT

## 2025-01-01 PROCEDURE — P9045: CPT

## 2025-01-01 PROCEDURE — 86850 RBC ANTIBODY SCREEN: CPT

## 2025-01-01 PROCEDURE — 82746 ASSAY OF FOLIC ACID SERUM: CPT

## 2025-01-01 PROCEDURE — 71045 X-RAY EXAM CHEST 1 VIEW: CPT

## 2025-01-01 PROCEDURE — 88189 FLOWCYTOMETRY/READ 16 & >: CPT | Mod: 59

## 2025-01-01 PROCEDURE — 93971 EXTREMITY STUDY: CPT

## 2025-01-01 PROCEDURE — 84300 ASSAY OF URINE SODIUM: CPT

## 2025-01-01 PROCEDURE — 86706 HEP B SURFACE ANTIBODY: CPT

## 2025-01-01 PROCEDURE — C1751: CPT

## 2025-01-01 PROCEDURE — 93306 TTE W/DOPPLER COMPLETE: CPT

## 2025-01-01 PROCEDURE — 83615 LACTATE (LD) (LDH) ENZYME: CPT

## 2025-01-01 PROCEDURE — 86704 HEP B CORE ANTIBODY TOTAL: CPT

## 2025-01-01 PROCEDURE — 84156 ASSAY OF PROTEIN URINE: CPT

## 2025-01-01 PROCEDURE — 96375 TX/PRO/DX INJ NEW DRUG ADDON: CPT | Mod: XU

## 2025-01-01 PROCEDURE — 99223 1ST HOSP IP/OBS HIGH 75: CPT

## 2025-01-01 PROCEDURE — 88237 TISSUE CULTURE BONE MARROW: CPT

## 2025-01-01 PROCEDURE — 94660 CPAP INITIATION&MGMT: CPT

## 2025-01-01 PROCEDURE — 36430 TRANSFUSION BLD/BLD COMPNT: CPT

## 2025-01-01 PROCEDURE — 86900 BLOOD TYPING SEROLOGIC ABO: CPT

## 2025-01-01 PROCEDURE — 87640 STAPH A DNA AMP PROBE: CPT

## 2025-01-01 PROCEDURE — 83010 ASSAY OF HAPTOGLOBIN QUANT: CPT

## 2025-01-01 PROCEDURE — 81245 FLT3 GENE: CPT

## 2025-01-01 PROCEDURE — 84295 ASSAY OF SERUM SODIUM: CPT

## 2025-01-01 PROCEDURE — 36556 INSERT NON-TUNNEL CV CATH: CPT

## 2025-01-01 PROCEDURE — 80053 COMPREHEN METABOLIC PANEL: CPT

## 2025-01-01 PROCEDURE — 72132 CT LUMBAR SPINE W/DYE: CPT | Mod: 26,MC

## 2025-01-01 PROCEDURE — 71250 CT THORAX DX C-: CPT | Mod: MC

## 2025-01-01 PROCEDURE — 82728 ASSAY OF FERRITIN: CPT

## 2025-01-01 PROCEDURE — 70450 CT HEAD/BRAIN W/O DYE: CPT | Mod: MC

## 2025-01-01 PROCEDURE — 71045 X-RAY EXAM CHEST 1 VIEW: CPT | Mod: 26,59,77

## 2025-01-01 PROCEDURE — P9040: CPT

## 2025-01-01 PROCEDURE — 82955 ASSAY OF G6PD ENZYME: CPT

## 2025-01-01 PROCEDURE — 74177 CT ABD & PELVIS W/CONTRAST: CPT | Mod: 26,MC

## 2025-01-01 PROCEDURE — 85025 COMPLETE CBC W/AUTO DIFF WBC: CPT

## 2025-01-01 PROCEDURE — 93306 TTE W/DOPPLER COMPLETE: CPT | Mod: 26

## 2025-01-01 PROCEDURE — 88291 CYTO/MOLECULAR REPORT: CPT | Mod: 59

## 2025-01-01 PROCEDURE — P9100: CPT

## 2025-01-01 PROCEDURE — 88360 TUMOR IMMUNOHISTOCHEM/MANUAL: CPT | Mod: 26

## 2025-01-01 PROCEDURE — 96374 THER/PROPH/DIAG INJ IV PUSH: CPT | Mod: XU

## 2025-01-01 PROCEDURE — 36514 APHERESIS PLASMA: CPT

## 2025-01-01 PROCEDURE — G0452: CPT | Mod: 26

## 2025-01-01 PROCEDURE — 83935 ASSAY OF URINE OSMOLALITY: CPT

## 2025-01-01 PROCEDURE — 82803 BLOOD GASES ANY COMBINATION: CPT

## 2025-01-01 PROCEDURE — 83735 ASSAY OF MAGNESIUM: CPT

## 2025-01-01 PROCEDURE — 82947 ASSAY GLUCOSE BLOOD QUANT: CPT

## 2025-01-01 PROCEDURE — 88185 FLOWCYTOMETRY/TC ADD-ON: CPT

## 2025-01-01 PROCEDURE — 88305 TISSUE EXAM BY PATHOLOGIST: CPT | Mod: 26

## 2025-01-01 PROCEDURE — 87389 HIV-1 AG W/HIV-1&-2 AB AG IA: CPT

## 2025-01-01 RX ORDER — CALCIUM GLUCONATE 94 MG/ML
2 INJECTION, SOLUTION INTRAVENOUS ONCE
Refills: 0 | Status: COMPLETED | OUTPATIENT
Start: 2025-01-01 | End: 2025-01-01

## 2025-01-01 RX ORDER — SODIUM PHOSPHATE, MONOBASIC, MONOHYDRATE AND SODIUM PHOSPHATE, DIBASIC ANHYDROUS 142; 276 MG/ML; MG/ML
15 INJECTION, SOLUTION INTRAVENOUS ONCE
Refills: 0 | Status: COMPLETED | OUTPATIENT
Start: 2025-01-01 | End: 2025-01-01

## 2025-01-01 RX ORDER — DEXTROSE MONOHYDRATE 25 G/50ML
12.5 INJECTION, SOLUTION INTRAVENOUS ONCE
Refills: 0 | Status: DISCONTINUED | OUTPATIENT
Start: 2025-01-01 | End: 2025-01-01

## 2025-01-01 RX ORDER — MORPHINE SULFATE 15 MG
4 TABLET, EXTENDED RELEASE ORAL ONCE
Refills: 0 | Status: DISCONTINUED | OUTPATIENT
Start: 2025-01-01 | End: 2025-01-01

## 2025-01-01 RX ORDER — VALACYCLOVIR HYDROCHLORIDE 1000 MG/1
500 TABLET, FILM COATED ORAL EVERY 12 HOURS
Refills: 0 | Status: DISCONTINUED | OUTPATIENT
Start: 2025-01-01 | End: 2025-01-01

## 2025-01-01 RX ORDER — SODIUM CHLORIDE 9 MG/ML
1000 INJECTION, SOLUTION INTRAVENOUS
Refills: 0 | Status: DISCONTINUED | OUTPATIENT
Start: 2025-01-01 | End: 2025-01-01

## 2025-01-01 RX ORDER — METOPROLOL TARTRATE 50 MG
5 TABLET ORAL ONCE
Refills: 0 | Status: COMPLETED | OUTPATIENT
Start: 2025-01-01 | End: 2025-01-01

## 2025-01-01 RX ORDER — ALLOPURINOL 100 MG/1
0 TABLET ORAL
Refills: 0 | DISCHARGE

## 2025-01-01 RX ORDER — LISINOPRIL 30 MG/1
40 TABLET ORAL DAILY
Refills: 0 | Status: DISCONTINUED | OUTPATIENT
Start: 2025-01-01 | End: 2025-01-01

## 2025-01-01 RX ORDER — ACETAMINOPHEN 80 MG/.8ML
1000 SOLUTION/ DROPS ORAL ONCE
Refills: 0 | Status: COMPLETED | OUTPATIENT
Start: 2025-01-01 | End: 2025-01-01

## 2025-01-01 RX ORDER — POTASSIUM CHLORIDE 600 MG/1
40 TABLET, FILM COATED, EXTENDED RELEASE ORAL ONCE
Refills: 0 | Status: COMPLETED | OUTPATIENT
Start: 2025-01-01 | End: 2025-01-01

## 2025-01-01 RX ORDER — CHLORHEXIDINE GLUCONATE 1.2 MG/ML
1 RINSE ORAL
Refills: 0 | Status: DISCONTINUED | OUTPATIENT
Start: 2025-01-01 | End: 2025-01-01

## 2025-01-01 RX ORDER — HYDROMORPHONE HCL 4 MG
0.5 TABLET ORAL
Refills: 0 | Status: DISCONTINUED | OUTPATIENT
Start: 2025-01-01 | End: 2025-01-01

## 2025-01-01 RX ORDER — DEXTROSE MONOHYDRATE 25 G/50ML
25 INJECTION, SOLUTION INTRAVENOUS ONCE
Refills: 0 | Status: DISCONTINUED | OUTPATIENT
Start: 2025-01-01 | End: 2025-01-01

## 2025-01-01 RX ORDER — CASPOFUNGIN ACETATE 7 MG/ML
70 INJECTION, POWDER, LYOPHILIZED, FOR SOLUTION INTRAVENOUS ONCE
Refills: 0 | Status: COMPLETED | OUTPATIENT
Start: 2025-01-01 | End: 2025-01-01

## 2025-01-01 RX ORDER — DIPHENHYDRAMINE HCL 25 MG
50 TABLET ORAL ONCE
Refills: 0 | Status: DISCONTINUED | OUTPATIENT
Start: 2025-01-01 | End: 2025-01-01

## 2025-01-01 RX ORDER — CASPOFUNGIN ACETATE 7 MG/ML
INJECTION, POWDER, LYOPHILIZED, FOR SOLUTION INTRAVENOUS
Refills: 0 | Status: DISCONTINUED | OUTPATIENT
Start: 2025-01-01 | End: 2025-01-01

## 2025-01-01 RX ORDER — HYDROMORPHONE HCL 4 MG
0.25 TABLET ORAL ONCE
Refills: 0 | Status: DISCONTINUED | OUTPATIENT
Start: 2025-01-01 | End: 2025-01-01

## 2025-01-01 RX ORDER — MAGNESIUM SULFATE 500 MG/ML
2 INJECTION, SOLUTION INTRAMUSCULAR; INTRAVENOUS ONCE
Refills: 0 | Status: COMPLETED | OUTPATIENT
Start: 2025-01-01 | End: 2025-01-01

## 2025-01-01 RX ORDER — DECITABINE 50 MG/20ML
40 INJECTION, POWDER, LYOPHILIZED, FOR SOLUTION INTRAVENOUS EVERY 24 HOURS
Refills: 0 | Status: DISCONTINUED | OUTPATIENT
Start: 2025-01-01 | End: 2025-01-01

## 2025-01-01 RX ORDER — INSULIN LISPRO 100/ML
VIAL (ML) SUBCUTANEOUS EVERY 6 HOURS
Refills: 0 | Status: DISCONTINUED | OUTPATIENT
Start: 2025-01-01 | End: 2025-01-01

## 2025-01-01 RX ORDER — POTASSIUM CHLORIDE 600 MG/1
40 TABLET, FILM COATED, EXTENDED RELEASE ORAL ONCE
Refills: 0 | Status: DISCONTINUED | OUTPATIENT
Start: 2025-01-01 | End: 2025-01-01

## 2025-01-01 RX ORDER — ACETAMINOPHEN 80 MG/.8ML
650 SOLUTION/ DROPS ORAL ONCE
Refills: 0 | Status: COMPLETED | OUTPATIENT
Start: 2025-01-01 | End: 2025-01-01

## 2025-01-01 RX ORDER — METOPROLOL TARTRATE 50 MG
1 TABLET ORAL
Refills: 0 | DISCHARGE

## 2025-01-01 RX ORDER — HYDROMORPHONE HCL 4 MG
0.5 TABLET ORAL ONCE
Refills: 0 | Status: DISCONTINUED | OUTPATIENT
Start: 2025-01-01 | End: 2025-01-01

## 2025-01-01 RX ORDER — GLUCAGON INJECTION, SOLUTION 0.5 MG/.1ML
1 INJECTION, SOLUTION SUBCUTANEOUS ONCE
Refills: 0 | Status: DISCONTINUED | OUTPATIENT
Start: 2025-01-01 | End: 2025-01-01

## 2025-01-01 RX ORDER — METOPROLOL TARTRATE 50 MG
50 TABLET ORAL
Refills: 0 | Status: DISCONTINUED | OUTPATIENT
Start: 2025-01-01 | End: 2025-01-01

## 2025-01-01 RX ORDER — HYDROMORPHONE HCL 4 MG
0.5 TABLET ORAL EVERY 4 HOURS
Refills: 0 | Status: DISCONTINUED | OUTPATIENT
Start: 2025-01-01 | End: 2025-01-01

## 2025-01-01 RX ORDER — POTASSIUM CHLORIDE 600 MG/1
10 TABLET, FILM COATED, EXTENDED RELEASE ORAL
Refills: 0 | Status: DISCONTINUED | OUTPATIENT
Start: 2025-01-01 | End: 2025-01-01

## 2025-01-01 RX ORDER — RASBURICASE
3 KIT TOPICAL ONCE
Refills: 0 | Status: COMPLETED | OUTPATIENT
Start: 2025-01-01 | End: 2025-01-01

## 2025-01-01 RX ORDER — GEMTUZUMAB OZOGAMICIN 5 MG/5ML
4.5 INJECTION, POWDER, LYOPHILIZED, FOR SOLUTION INTRAVENOUS ONCE
Refills: 0 | Status: COMPLETED | OUTPATIENT
Start: 2025-01-01 | End: 2025-01-01

## 2025-01-01 RX ORDER — ACETAMINOPHEN 80 MG/.8ML
650 SOLUTION/ DROPS ORAL ONCE
Refills: 0 | Status: DISCONTINUED | OUTPATIENT
Start: 2025-01-01 | End: 2025-01-01

## 2025-01-01 RX ORDER — SODIUM CHLORIDE 9 MG/ML
10 INJECTION, SOLUTION INTRAMUSCULAR; INTRAVENOUS; SUBCUTANEOUS
Refills: 0 | Status: DISCONTINUED | OUTPATIENT
Start: 2025-01-01 | End: 2025-01-01

## 2025-01-01 RX ORDER — HYDRALAZINE HYDROCHLORIDE 10 MG/1
10 TABLET ORAL ONCE
Refills: 0 | Status: COMPLETED | OUTPATIENT
Start: 2025-01-01 | End: 2025-01-01

## 2025-01-01 RX ORDER — HYDROXYUREA 500 MG/1
2000 CAPSULE ORAL
Refills: 0 | Status: DISCONTINUED | OUTPATIENT
Start: 2025-01-01 | End: 2025-01-01

## 2025-01-01 RX ORDER — LISINOPRIL 30 MG/1
20 TABLET ORAL DAILY
Refills: 0 | Status: DISCONTINUED | OUTPATIENT
Start: 2025-01-01 | End: 2025-01-01

## 2025-01-01 RX ORDER — DEXTROSE MONOHYDRATE 25 G/50ML
15 INJECTION, SOLUTION INTRAVENOUS ONCE
Refills: 0 | Status: DISCONTINUED | OUTPATIENT
Start: 2025-01-01 | End: 2025-01-01

## 2025-01-01 RX ORDER — POTASSIUM CHLORIDE 600 MG/1
10 TABLET, FILM COATED, EXTENDED RELEASE ORAL ONCE
Refills: 0 | Status: DISCONTINUED | OUTPATIENT
Start: 2025-01-01 | End: 2025-01-01

## 2025-01-01 RX ORDER — FUROSEMIDE 20 MG
40 TABLET ORAL ONCE
Refills: 0 | Status: COMPLETED | OUTPATIENT
Start: 2025-01-01 | End: 2025-01-01

## 2025-01-01 RX ORDER — HYDROXYUREA 500 MG/1
2000 CAPSULE ORAL THREE TIMES A DAY
Refills: 0 | Status: DISCONTINUED | OUTPATIENT
Start: 2025-01-01 | End: 2025-01-01

## 2025-01-01 RX ORDER — SODIUM CHLORIDE 9 MG/ML
1000 INJECTION, SOLUTION INTRAMUSCULAR; INTRAVENOUS; SUBCUTANEOUS
Refills: 0 | Status: DISCONTINUED | OUTPATIENT
Start: 2025-01-01 | End: 2025-01-01

## 2025-01-01 RX ORDER — EZETIMIBE 10 MG/1
10 TABLET ORAL AT BEDTIME
Refills: 0 | Status: DISCONTINUED | OUTPATIENT
Start: 2025-01-01 | End: 2025-01-01

## 2025-01-01 RX ORDER — POLYETHYLENE GLYCOL 3350 17 G/DOSE
17 POWDER (GRAM) ORAL DAILY
Refills: 0 | Status: DISCONTINUED | OUTPATIENT
Start: 2025-01-01 | End: 2025-01-01

## 2025-01-01 RX ORDER — LABETALOL HCL 300 MG/1
10 TABLET, FILM COATED ORAL ONCE
Refills: 0 | Status: COMPLETED | OUTPATIENT
Start: 2025-01-01 | End: 2025-01-01

## 2025-01-01 RX ORDER — POTASSIUM CHLORIDE 600 MG/1
40 TABLET, FILM COATED, EXTENDED RELEASE ORAL
Refills: 0 | Status: COMPLETED | OUTPATIENT
Start: 2025-01-01 | End: 2025-01-01

## 2025-01-01 RX ORDER — POTASSIUM CHLORIDE 600 MG/1
10 TABLET, FILM COATED, EXTENDED RELEASE ORAL
Refills: 0 | Status: COMPLETED | OUTPATIENT
Start: 2025-01-01 | End: 2025-01-01

## 2025-01-01 RX ORDER — ALLOPURINOL 100 MG/1
100 TABLET ORAL DAILY
Refills: 0 | Status: DISCONTINUED | OUTPATIENT
Start: 2025-01-01 | End: 2025-01-01

## 2025-01-01 RX ORDER — CASPOFUNGIN ACETATE 7 MG/ML
50 INJECTION, POWDER, LYOPHILIZED, FOR SOLUTION INTRAVENOUS EVERY 24 HOURS
Refills: 0 | Status: DISCONTINUED | OUTPATIENT
Start: 2025-01-01 | End: 2025-01-01

## 2025-01-01 RX ORDER — CARVEDILOL 25 MG/1
6.25 TABLET, FILM COATED ORAL EVERY 12 HOURS
Refills: 0 | Status: DISCONTINUED | OUTPATIENT
Start: 2025-01-01 | End: 2025-01-01

## 2025-01-01 RX ORDER — LORAZEPAM 1 MG/1
0.25 TABLET ORAL
Refills: 0 | Status: DISCONTINUED | OUTPATIENT
Start: 2025-01-01 | End: 2025-01-01

## 2025-01-01 RX ORDER — OXYCODONE HCL 15 MG
5 TABLET ORAL EVERY 6 HOURS
Refills: 0 | Status: DISCONTINUED | OUTPATIENT
Start: 2025-01-01 | End: 2025-01-01

## 2025-01-01 RX ORDER — FENTANYL 75 UG/H
25 PATCH, EXTENDED RELEASE TRANSDERMAL ONCE
Refills: 0 | Status: DISCONTINUED | OUTPATIENT
Start: 2025-01-01 | End: 2025-01-01

## 2025-01-01 RX ORDER — VENETOCLAX 100 MG/1
4 TABLET, FILM COATED ORAL
Qty: 120 | Refills: 0
Start: 2025-01-01 | End: 2025-02-10

## 2025-01-01 RX ORDER — DIPHENHYDRAMINE HCL 25 MG
50 TABLET ORAL ONCE
Refills: 0 | Status: COMPLETED | OUTPATIENT
Start: 2025-01-01 | End: 2025-01-01

## 2025-01-01 RX ORDER — GLYCOPYRROLATE 0.2 MG/ML
0.4 INJECTION, SOLUTION INTRAMUSCULAR; INTRAVENOUS EVERY 6 HOURS
Refills: 0 | Status: DISCONTINUED | OUTPATIENT
Start: 2025-01-01 | End: 2025-01-01

## 2025-01-01 RX ORDER — INSULIN HUMAN 100 [IU]/ML
INJECTION, SOLUTION SUBCUTANEOUS
Refills: 0 | Status: DISCONTINUED | OUTPATIENT
Start: 2025-01-01 | End: 2025-01-01

## 2025-01-01 RX ORDER — HYDROMORPHONE HCL 4 MG
0.25 TABLET ORAL
Refills: 0 | Status: DISCONTINUED | OUTPATIENT
Start: 2025-01-01 | End: 2025-01-01

## 2025-01-01 RX ORDER — POTASSIUM CHLORIDE 600 MG/1
10 TABLET, FILM COATED, EXTENDED RELEASE ORAL ONCE
Refills: 0 | Status: COMPLETED | OUTPATIENT
Start: 2025-01-01 | End: 2025-01-01

## 2025-01-01 RX ORDER — ALLOPURINOL 100 MG/1
300 TABLET ORAL DAILY
Refills: 0 | Status: DISCONTINUED | OUTPATIENT
Start: 2025-01-01 | End: 2025-01-01

## 2025-01-01 RX ORDER — SALIVA SUBSTITUTE COMBO NO.3
15 AEROSOL, SPRAY WITH PUMP (ML) MUCOUS MEMBRANE THREE TIMES A DAY
Refills: 0 | Status: DISCONTINUED | OUTPATIENT
Start: 2025-01-01 | End: 2025-01-01

## 2025-01-01 RX ORDER — ATORVASTATIN CALCIUM 40 MG/1
80 TABLET, FILM COATED ORAL AT BEDTIME
Refills: 0 | Status: DISCONTINUED | OUTPATIENT
Start: 2025-01-01 | End: 2025-01-01

## 2025-01-01 RX ORDER — INSULIN NPH HUMAN ISOPHANE 100/ML
3 VIAL (ML) SUBCUTANEOUS EVERY 6 HOURS
Refills: 0 | Status: DISCONTINUED | OUTPATIENT
Start: 2025-01-01 | End: 2025-01-01

## 2025-01-01 RX ORDER — METHYLPREDNISOLONE 4 MG/1
40 TABLET ORAL ONCE
Refills: 0 | Status: COMPLETED | OUTPATIENT
Start: 2025-01-01 | End: 2025-01-01

## 2025-01-01 RX ORDER — AMIODARONE HYDROCHLORIDE 200 MG/1
150 TABLET ORAL ONCE
Refills: 0 | Status: COMPLETED | OUTPATIENT
Start: 2025-01-01 | End: 2025-01-01

## 2025-01-01 RX ORDER — BISACODYL 5 MG
10 TABLET, DELAYED RELEASE (ENTERIC COATED) ORAL DAILY
Refills: 0 | Status: DISCONTINUED | OUTPATIENT
Start: 2025-01-01 | End: 2025-01-01

## 2025-01-01 RX ORDER — ONDANSETRON 4 MG/1
4 TABLET ORAL EVERY 6 HOURS
Refills: 0 | Status: DISCONTINUED | OUTPATIENT
Start: 2025-01-01 | End: 2025-01-01

## 2025-01-01 RX ORDER — VANCOMYCIN HYDROCHLORIDE 5 G/100ML
1000 INJECTION, POWDER, LYOPHILIZED, FOR SOLUTION INTRAVENOUS ONCE
Refills: 0 | Status: COMPLETED | OUTPATIENT
Start: 2025-01-01 | End: 2025-01-01

## 2025-01-01 RX ORDER — LORAZEPAM 1 MG/1
0.5 TABLET ORAL EVERY 4 HOURS
Refills: 0 | Status: DISCONTINUED | OUTPATIENT
Start: 2025-01-01 | End: 2025-01-01

## 2025-01-01 RX ORDER — ONDANSETRON 4 MG/1
4 TABLET ORAL ONCE
Refills: 0 | Status: COMPLETED | OUTPATIENT
Start: 2025-01-01 | End: 2025-01-01

## 2025-01-01 RX ORDER — LABETALOL HCL 300 MG/1
10 TABLET, FILM COATED ORAL EVERY 6 HOURS
Refills: 0 | Status: DISCONTINUED | OUTPATIENT
Start: 2025-01-01 | End: 2025-01-01

## 2025-01-01 RX ORDER — MAGNESIUM SULFATE 500 MG/ML
1 INJECTION, SOLUTION INTRAMUSCULAR; INTRAVENOUS ONCE
Refills: 0 | Status: COMPLETED | OUTPATIENT
Start: 2025-01-01 | End: 2025-01-01

## 2025-01-01 RX ORDER — MAGNESIUM SULFATE 500 MG/ML
2 INJECTION, SOLUTION INTRAMUSCULAR; INTRAVENOUS ONCE
Refills: 0 | Status: DISCONTINUED | OUTPATIENT
Start: 2025-01-01 | End: 2025-01-01

## 2025-01-01 RX ORDER — SENNOSIDES 8.6 MG/1
2 TABLET, FILM COATED ORAL AT BEDTIME
Refills: 0 | Status: DISCONTINUED | OUTPATIENT
Start: 2025-01-01 | End: 2025-01-01

## 2025-01-01 RX ORDER — METHYLPREDNISOLONE 4 MG/1
40 TABLET ORAL ONCE
Refills: 0 | Status: DISCONTINUED | OUTPATIENT
Start: 2025-01-01 | End: 2025-01-01

## 2025-01-01 RX ORDER — GEMTUZUMAB OZOGAMICIN 5 MG/5ML
4.5 INJECTION, POWDER, LYOPHILIZED, FOR SOLUTION INTRAVENOUS ONCE
Refills: 0 | Status: DISCONTINUED | OUTPATIENT
Start: 2025-01-01 | End: 2025-01-01

## 2025-01-01 RX ORDER — INSULIN LISPRO 100/ML
VIAL (ML) SUBCUTANEOUS
Refills: 0 | Status: DISCONTINUED | OUTPATIENT
Start: 2025-01-01 | End: 2025-01-01

## 2025-01-01 RX ORDER — ACETAMINOPHEN 80 MG/.8ML
650 SOLUTION/ DROPS ORAL EVERY 6 HOURS
Refills: 0 | Status: DISCONTINUED | OUTPATIENT
Start: 2025-01-01 | End: 2025-01-01

## 2025-01-01 RX ORDER — ONDANSETRON 4 MG/1
8 TABLET ORAL EVERY 24 HOURS
Refills: 0 | Status: DISCONTINUED | OUTPATIENT
Start: 2025-01-01 | End: 2025-01-01

## 2025-01-01 RX ORDER — SODIUM CHLORIDE 9 MG/ML
1000 INJECTION, SOLUTION INTRAMUSCULAR; INTRAVENOUS; SUBCUTANEOUS ONCE
Refills: 0 | Status: COMPLETED | OUTPATIENT
Start: 2025-01-01 | End: 2025-01-01

## 2025-01-01 RX ADMIN — Medication 15 MILLILITER(S): at 13:17

## 2025-01-01 RX ADMIN — Medication 0.25 MILLIGRAM(S): at 23:50

## 2025-01-01 RX ADMIN — ACETAMINOPHEN 400 MILLIGRAM(S): 80 SOLUTION/ DROPS ORAL at 13:30

## 2025-01-01 RX ADMIN — Medication 2: at 12:36

## 2025-01-01 RX ADMIN — Medication 1: at 08:54

## 2025-01-01 RX ADMIN — CHLORHEXIDINE GLUCONATE 1 APPLICATION(S): 1.2 RINSE ORAL at 09:00

## 2025-01-01 RX ADMIN — ACETAMINOPHEN 400 MILLIGRAM(S): 80 SOLUTION/ DROPS ORAL at 08:00

## 2025-01-01 RX ADMIN — Medication 5 MILLIGRAM(S): at 12:35

## 2025-01-01 RX ADMIN — ACETAMINOPHEN 650 MILLIGRAM(S): 80 SOLUTION/ DROPS ORAL at 15:59

## 2025-01-01 RX ADMIN — METHYLPREDNISOLONE 40 MILLIGRAM(S): 4 TABLET ORAL at 16:05

## 2025-01-01 RX ADMIN — Medication 5 MILLIGRAM(S): at 22:19

## 2025-01-01 RX ADMIN — Medication 0.25 MILLIGRAM(S): at 06:30

## 2025-01-01 RX ADMIN — DECITABINE 40 MILLIGRAM(S): 50 INJECTION, POWDER, LYOPHILIZED, FOR SOLUTION INTRAVENOUS at 16:00

## 2025-01-01 RX ADMIN — HYDROXYUREA 2000 MILLIGRAM(S): 500 CAPSULE ORAL at 05:11

## 2025-01-01 RX ADMIN — MAGNESIUM SULFATE 25 GRAM(S): 500 INJECTION, SOLUTION INTRAMUSCULAR; INTRAVENOUS at 08:28

## 2025-01-01 RX ADMIN — RASBURICASE 104 MILLIGRAM(S): KIT at 23:12

## 2025-01-01 RX ADMIN — ATORVASTATIN CALCIUM 80 MILLIGRAM(S): 40 TABLET, FILM COATED ORAL at 20:08

## 2025-01-01 RX ADMIN — Medication 2: at 00:46

## 2025-01-01 RX ADMIN — ALLOPURINOL 300 MILLIGRAM(S): 100 TABLET ORAL at 20:44

## 2025-01-01 RX ADMIN — HYDROXYUREA 2000 MILLIGRAM(S): 500 CAPSULE ORAL at 13:13

## 2025-01-01 RX ADMIN — Medication 15 MILLILITER(S): at 06:08

## 2025-01-01 RX ADMIN — Medication 3 UNIT(S): at 00:45

## 2025-01-01 RX ADMIN — Medication 15 MILLILITER(S): at 21:36

## 2025-01-01 RX ADMIN — Medication 50 MILLIGRAM(S): at 08:49

## 2025-01-01 RX ADMIN — Medication 0.25 MILLIGRAM(S): at 13:13

## 2025-01-01 RX ADMIN — Medication 1: at 13:15

## 2025-01-01 RX ADMIN — Medication 100 MILLIGRAM(S): at 14:20

## 2025-01-01 RX ADMIN — ACETAMINOPHEN 400 MILLIGRAM(S): 80 SOLUTION/ DROPS ORAL at 00:20

## 2025-01-01 RX ADMIN — ALLOPURINOL 300 MILLIGRAM(S): 100 TABLET ORAL at 13:14

## 2025-01-01 RX ADMIN — HYDRALAZINE HYDROCHLORIDE 10 MILLIGRAM(S): 10 TABLET ORAL at 05:56

## 2025-01-01 RX ADMIN — POTASSIUM CHLORIDE 40 MILLIEQUIVALENT(S): 600 TABLET, FILM COATED, EXTENDED RELEASE ORAL at 10:25

## 2025-01-01 RX ADMIN — ALLOPURINOL 300 MILLIGRAM(S): 100 TABLET ORAL at 12:35

## 2025-01-01 RX ADMIN — Medication 15 MILLILITER(S): at 21:20

## 2025-01-01 RX ADMIN — Medication 250 MILLILITER(S): at 03:30

## 2025-01-01 RX ADMIN — MAGNESIUM SULFATE 25 GRAM(S): 500 INJECTION, SOLUTION INTRAMUSCULAR; INTRAVENOUS at 05:16

## 2025-01-01 RX ADMIN — Medication 50 MILLIGRAM(S): at 21:23

## 2025-01-01 RX ADMIN — Medication 17 GRAM(S): at 12:04

## 2025-01-01 RX ADMIN — Medication 2: at 05:26

## 2025-01-01 RX ADMIN — HYDROXYUREA 2000 MILLIGRAM(S): 500 CAPSULE ORAL at 22:22

## 2025-01-01 RX ADMIN — CHLORHEXIDINE GLUCONATE 1 APPLICATION(S): 1.2 RINSE ORAL at 09:07

## 2025-01-01 RX ADMIN — ACETAMINOPHEN 1000 MILLIGRAM(S): 80 SOLUTION/ DROPS ORAL at 14:00

## 2025-01-01 RX ADMIN — Medication 3 UNIT(S): at 17:38

## 2025-01-01 RX ADMIN — CHLORHEXIDINE GLUCONATE 1 APPLICATION(S): 1.2 RINSE ORAL at 05:11

## 2025-01-01 RX ADMIN — ATORVASTATIN CALCIUM 80 MILLIGRAM(S): 40 TABLET, FILM COATED ORAL at 22:22

## 2025-01-01 RX ADMIN — ACETAMINOPHEN 1000 MILLIGRAM(S): 80 SOLUTION/ DROPS ORAL at 09:00

## 2025-01-01 RX ADMIN — LABETALOL HCL 10 MILLIGRAM(S): 300 TABLET, FILM COATED ORAL at 19:01

## 2025-01-01 RX ADMIN — Medication 2: at 22:15

## 2025-01-01 RX ADMIN — Medication 0.5 MILLIGRAM(S): at 16:19

## 2025-01-01 RX ADMIN — Medication 1: at 08:56

## 2025-01-01 RX ADMIN — RASBURICASE 104 MILLIGRAM(S): KIT at 14:18

## 2025-01-01 RX ADMIN — Medication 0.25 MILLIGRAM(S): at 09:34

## 2025-01-01 RX ADMIN — Medication 5 MILLIGRAM(S): at 15:20

## 2025-01-01 RX ADMIN — Medication 5 MILLIGRAM(S): at 06:14

## 2025-01-01 RX ADMIN — LORAZEPAM 0.25 MILLIGRAM(S): 1 TABLET ORAL at 20:38

## 2025-01-01 RX ADMIN — Medication 5: at 21:55

## 2025-01-01 RX ADMIN — Medication 5 MILLIGRAM(S): at 16:00

## 2025-01-01 RX ADMIN — Medication 4: at 09:35

## 2025-01-01 RX ADMIN — Medication 3 UNIT(S): at 05:27

## 2025-01-01 RX ADMIN — ONDANSETRON 8 MILLIGRAM(S): 4 TABLET ORAL at 15:25

## 2025-01-01 RX ADMIN — Medication 100 MILLIGRAM(S): at 13:13

## 2025-01-01 RX ADMIN — CHLORHEXIDINE GLUCONATE 1 APPLICATION(S): 1.2 RINSE ORAL at 09:01

## 2025-01-01 RX ADMIN — Medication 50 MILLIGRAM(S): at 22:32

## 2025-01-01 RX ADMIN — CASPOFUNGIN ACETATE 250 MILLIGRAM(S): 7 INJECTION, POWDER, LYOPHILIZED, FOR SOLUTION INTRAVENOUS at 08:34

## 2025-01-01 RX ADMIN — ALLOPURINOL 300 MILLIGRAM(S): 100 TABLET ORAL at 12:23

## 2025-01-01 RX ADMIN — Medication 100 MILLIGRAM(S): at 14:10

## 2025-01-01 RX ADMIN — GEMTUZUMAB OZOGAMICIN 4.5 MILLIGRAM(S): 5 INJECTION, POWDER, LYOPHILIZED, FOR SOLUTION INTRAVENOUS at 18:30

## 2025-01-01 RX ADMIN — Medication 15 MILLILITER(S): at 05:10

## 2025-01-01 RX ADMIN — ACETAMINOPHEN 1000 MILLIGRAM(S): 80 SOLUTION/ DROPS ORAL at 21:00

## 2025-01-01 RX ADMIN — Medication 15 MILLILITER(S): at 05:19

## 2025-01-01 RX ADMIN — ONDANSETRON 8 MILLIGRAM(S): 4 TABLET ORAL at 15:04

## 2025-01-01 RX ADMIN — CHLORHEXIDINE GLUCONATE 1 APPLICATION(S): 1.2 RINSE ORAL at 05:13

## 2025-01-01 RX ADMIN — Medication 1: at 22:00

## 2025-01-01 RX ADMIN — Medication 100 MILLIGRAM(S): at 21:36

## 2025-01-01 RX ADMIN — Medication 1: at 18:07

## 2025-01-01 RX ADMIN — POTASSIUM CHLORIDE 40 MILLIEQUIVALENT(S): 600 TABLET, FILM COATED, EXTENDED RELEASE ORAL at 08:28

## 2025-01-01 RX ADMIN — Medication 50 MILLIGRAM(S): at 21:14

## 2025-01-01 RX ADMIN — Medication 50 MILLIGRAM(S): at 20:55

## 2025-01-01 RX ADMIN — Medication 0.5 MILLIGRAM(S): at 13:53

## 2025-01-01 RX ADMIN — AMIODARONE HYDROCHLORIDE 600 MILLIGRAM(S): 200 TABLET ORAL at 22:51

## 2025-01-01 RX ADMIN — LISINOPRIL 20 MILLIGRAM(S): 30 TABLET ORAL at 17:18

## 2025-01-01 RX ADMIN — ACETAMINOPHEN 400 MILLIGRAM(S): 80 SOLUTION/ DROPS ORAL at 04:35

## 2025-01-01 RX ADMIN — Medication 100 MILLIGRAM(S): at 05:36

## 2025-01-01 RX ADMIN — MAGNESIUM SULFATE 100 GRAM(S): 500 INJECTION, SOLUTION INTRAMUSCULAR; INTRAVENOUS at 21:23

## 2025-01-01 RX ADMIN — Medication 100 MILLIGRAM(S): at 05:10

## 2025-01-01 RX ADMIN — HYDROXYUREA 2000 MILLIGRAM(S): 500 CAPSULE ORAL at 06:33

## 2025-01-01 RX ADMIN — Medication 2: at 17:39

## 2025-01-01 RX ADMIN — POTASSIUM CHLORIDE 40 MILLIEQUIVALENT(S): 600 TABLET, FILM COATED, EXTENDED RELEASE ORAL at 21:43

## 2025-01-01 RX ADMIN — Medication 100 MILLIGRAM(S): at 22:22

## 2025-01-01 RX ADMIN — Medication 50 MILLIGRAM(S): at 08:15

## 2025-01-01 RX ADMIN — Medication 4: at 12:45

## 2025-01-01 RX ADMIN — Medication 100 MILLIGRAM(S): at 00:09

## 2025-01-01 RX ADMIN — ALLOPURINOL 300 MILLIGRAM(S): 100 TABLET ORAL at 12:32

## 2025-01-01 RX ADMIN — POTASSIUM CHLORIDE 40 MILLIEQUIVALENT(S): 600 TABLET, FILM COATED, EXTENDED RELEASE ORAL at 14:47

## 2025-01-01 RX ADMIN — ACETAMINOPHEN 1000 MILLIGRAM(S): 80 SOLUTION/ DROPS ORAL at 05:05

## 2025-01-01 RX ADMIN — LISINOPRIL 20 MILLIGRAM(S): 30 TABLET ORAL at 05:10

## 2025-01-01 RX ADMIN — Medication 1: at 18:22

## 2025-01-01 RX ADMIN — SODIUM CHLORIDE 50 MILLILITER(S): 9 INJECTION, SOLUTION INTRAMUSCULAR; INTRAVENOUS; SUBCUTANEOUS at 19:00

## 2025-01-01 RX ADMIN — Medication 100 MILLIGRAM(S): at 13:14

## 2025-01-01 RX ADMIN — LABETALOL HCL 10 MILLIGRAM(S): 300 TABLET, FILM COATED ORAL at 11:52

## 2025-01-01 RX ADMIN — CHLORHEXIDINE GLUCONATE 1 APPLICATION(S): 1.2 RINSE ORAL at 09:24

## 2025-01-01 RX ADMIN — FENTANYL 25 MICROGRAM(S): 75 PATCH, EXTENDED RELEASE TRANSDERMAL at 18:54

## 2025-01-01 RX ADMIN — Medication 5 MILLIGRAM(S): at 18:39

## 2025-01-01 RX ADMIN — POTASSIUM CHLORIDE 100 MILLIEQUIVALENT(S): 600 TABLET, FILM COATED, EXTENDED RELEASE ORAL at 14:48

## 2025-01-01 RX ADMIN — VALACYCLOVIR HYDROCHLORIDE 500 MILLIGRAM(S): 1000 TABLET, FILM COATED ORAL at 17:41

## 2025-01-01 RX ADMIN — Medication 40 MILLIGRAM(S): at 20:00

## 2025-01-01 RX ADMIN — CHLORHEXIDINE GLUCONATE 1 APPLICATION(S): 1.2 RINSE ORAL at 05:47

## 2025-01-01 RX ADMIN — LABETALOL HCL 10 MILLIGRAM(S): 300 TABLET, FILM COATED ORAL at 00:44

## 2025-01-01 RX ADMIN — MAGNESIUM SULFATE 25 GRAM(S): 500 INJECTION, SOLUTION INTRAMUSCULAR; INTRAVENOUS at 12:35

## 2025-01-01 RX ADMIN — Medication 0.25 MILLIGRAM(S): at 16:15

## 2025-01-01 RX ADMIN — Medication 40 MILLIGRAM(S): at 17:41

## 2025-01-01 RX ADMIN — Medication 100 MILLIGRAM(S): at 06:08

## 2025-01-01 RX ADMIN — FENTANYL 25 MICROGRAM(S): 75 PATCH, EXTENDED RELEASE TRANSDERMAL at 07:27

## 2025-01-01 RX ADMIN — LORAZEPAM 0.5 MILLIGRAM(S): 1 TABLET ORAL at 13:08

## 2025-01-01 RX ADMIN — Medication 50 MILLIGRAM(S): at 08:56

## 2025-01-01 RX ADMIN — HYDROXYUREA 2000 MILLIGRAM(S): 500 CAPSULE ORAL at 21:36

## 2025-01-01 RX ADMIN — CHLORHEXIDINE GLUCONATE 1 APPLICATION(S): 1.2 RINSE ORAL at 08:34

## 2025-01-01 RX ADMIN — Medication 1: at 17:42

## 2025-01-01 RX ADMIN — Medication 3: at 21:36

## 2025-01-01 RX ADMIN — Medication 0.25 MILLIGRAM(S): at 05:45

## 2025-01-01 RX ADMIN — Medication 50 MILLIGRAM(S): at 20:07

## 2025-01-01 RX ADMIN — Medication 100 MILLIGRAM(S): at 05:54

## 2025-01-01 RX ADMIN — Medication 40 MILLIGRAM(S): at 14:20

## 2025-01-01 RX ADMIN — ACETAMINOPHEN 1000 MILLIGRAM(S): 80 SOLUTION/ DROPS ORAL at 17:00

## 2025-01-01 RX ADMIN — Medication 0.5 MILLIGRAM(S): at 16:45

## 2025-01-01 RX ADMIN — Medication 0.5 MILLIGRAM(S): at 20:00

## 2025-01-01 RX ADMIN — ACETAMINOPHEN 400 MILLIGRAM(S): 80 SOLUTION/ DROPS ORAL at 01:49

## 2025-01-01 RX ADMIN — HYDROXYUREA 2000 MILLIGRAM(S): 500 CAPSULE ORAL at 06:08

## 2025-01-01 RX ADMIN — Medication 5 MILLIGRAM(S): at 18:08

## 2025-01-01 RX ADMIN — Medication 3: at 11:56

## 2025-01-01 RX ADMIN — CHLORHEXIDINE GLUCONATE 1 APPLICATION(S): 1.2 RINSE ORAL at 05:19

## 2025-01-01 RX ADMIN — Medication 5 MILLIGRAM(S): at 05:14

## 2025-01-01 RX ADMIN — POTASSIUM CHLORIDE 40 MILLIEQUIVALENT(S): 600 TABLET, FILM COATED, EXTENDED RELEASE ORAL at 13:15

## 2025-01-01 RX ADMIN — Medication 17 GRAM(S): at 12:35

## 2025-01-01 RX ADMIN — ACETAMINOPHEN 400 MILLIGRAM(S): 80 SOLUTION/ DROPS ORAL at 18:55

## 2025-01-01 RX ADMIN — ACETAMINOPHEN 400 MILLIGRAM(S): 80 SOLUTION/ DROPS ORAL at 20:06

## 2025-01-01 RX ADMIN — HYDROXYUREA 2000 MILLIGRAM(S): 500 CAPSULE ORAL at 18:08

## 2025-01-01 RX ADMIN — ACETAMINOPHEN 650 MILLIGRAM(S): 80 SOLUTION/ DROPS ORAL at 00:19

## 2025-01-01 RX ADMIN — Medication 1: at 08:14

## 2025-01-01 RX ADMIN — ACETAMINOPHEN 400 MILLIGRAM(S): 80 SOLUTION/ DROPS ORAL at 16:19

## 2025-01-01 RX ADMIN — ATORVASTATIN CALCIUM 80 MILLIGRAM(S): 40 TABLET, FILM COATED ORAL at 21:36

## 2025-01-01 RX ADMIN — Medication 2: at 14:19

## 2025-01-01 RX ADMIN — HYDRALAZINE HYDROCHLORIDE 10 MILLIGRAM(S): 10 TABLET ORAL at 01:50

## 2025-01-01 RX ADMIN — Medication 4 MILLIGRAM(S): at 14:47

## 2025-01-01 RX ADMIN — HYDROXYUREA 2000 MILLIGRAM(S): 500 CAPSULE ORAL at 20:44

## 2025-01-01 RX ADMIN — ALLOPURINOL 300 MILLIGRAM(S): 100 TABLET ORAL at 13:13

## 2025-01-01 RX ADMIN — SODIUM CHLORIDE 1000 MILLILITER(S): 9 INJECTION, SOLUTION INTRAMUSCULAR; INTRAVENOUS; SUBCUTANEOUS at 14:48

## 2025-01-01 RX ADMIN — FENTANYL 25 MICROGRAM(S): 75 PATCH, EXTENDED RELEASE TRANSDERMAL at 07:45

## 2025-01-01 RX ADMIN — POTASSIUM CHLORIDE 100 MILLIEQUIVALENT(S): 600 TABLET, FILM COATED, EXTENDED RELEASE ORAL at 01:09

## 2025-01-01 RX ADMIN — Medication 100 MILLIGRAM(S): at 05:19

## 2025-01-01 RX ADMIN — Medication 0.25 MILLIGRAM(S): at 00:25

## 2025-01-01 RX ADMIN — Medication 5 MILLIGRAM(S): at 21:19

## 2025-01-01 RX ADMIN — ATORVASTATIN CALCIUM 80 MILLIGRAM(S): 40 TABLET, FILM COATED ORAL at 22:32

## 2025-01-01 RX ADMIN — Medication 50 MILLIGRAM(S): at 09:35

## 2025-01-01 RX ADMIN — Medication 0.5 MILLIGRAM(S): at 16:40

## 2025-01-01 RX ADMIN — Medication 0.5 MILLIGRAM(S): at 14:15

## 2025-01-01 RX ADMIN — HYDRALAZINE HYDROCHLORIDE 10 MILLIGRAM(S): 10 TABLET ORAL at 18:55

## 2025-01-01 RX ADMIN — HYDROXYUREA 2000 MILLIGRAM(S): 500 CAPSULE ORAL at 05:15

## 2025-01-01 RX ADMIN — Medication 100 MILLIGRAM(S): at 13:16

## 2025-01-01 RX ADMIN — Medication 5 MILLIGRAM(S): at 18:19

## 2025-01-01 RX ADMIN — Medication 2: at 08:49

## 2025-01-01 RX ADMIN — Medication 15 MILLILITER(S): at 13:14

## 2025-01-01 RX ADMIN — CASPOFUNGIN ACETATE 260 MILLIGRAM(S): 7 INJECTION, POWDER, LYOPHILIZED, FOR SOLUTION INTRAVENOUS at 13:16

## 2025-01-01 RX ADMIN — Medication 0.5 MILLIGRAM(S): at 16:29

## 2025-01-01 RX ADMIN — ACETAMINOPHEN 650 MILLIGRAM(S): 80 SOLUTION/ DROPS ORAL at 13:30

## 2025-01-01 RX ADMIN — Medication 0.5 MILLIGRAM(S): at 19:30

## 2025-01-01 RX ADMIN — DECITABINE 40 MILLIGRAM(S): 50 INJECTION, POWDER, LYOPHILIZED, FOR SOLUTION INTRAVENOUS at 16:48

## 2025-01-01 RX ADMIN — Medication 50 MILLIGRAM(S): at 08:51

## 2025-01-01 RX ADMIN — LABETALOL HCL 10 MILLIGRAM(S): 300 TABLET, FILM COATED ORAL at 01:25

## 2025-01-01 RX ADMIN — POTASSIUM CHLORIDE 100 MILLIEQUIVALENT(S): 600 TABLET, FILM COATED, EXTENDED RELEASE ORAL at 22:49

## 2025-01-01 RX ADMIN — ACETAMINOPHEN 650 MILLIGRAM(S): 80 SOLUTION/ DROPS ORAL at 21:36

## 2025-01-01 RX ADMIN — Medication 0.5 MILLIGRAM(S): at 09:44

## 2025-01-01 RX ADMIN — CARVEDILOL 6.25 MILLIGRAM(S): 25 TABLET, FILM COATED ORAL at 17:40

## 2025-01-01 RX ADMIN — ONDANSETRON 4 MILLIGRAM(S): 4 TABLET ORAL at 14:47

## 2025-01-01 RX ADMIN — ACETAMINOPHEN 650 MILLIGRAM(S): 80 SOLUTION/ DROPS ORAL at 10:20

## 2025-01-01 RX ADMIN — Medication 15 MILLILITER(S): at 14:09

## 2025-01-01 RX ADMIN — Medication 1: at 13:13

## 2025-01-01 RX ADMIN — HYDROXYUREA 2000 MILLIGRAM(S): 500 CAPSULE ORAL at 05:47

## 2025-01-01 RX ADMIN — ACETAMINOPHEN 1000 MILLIGRAM(S): 80 SOLUTION/ DROPS ORAL at 02:10

## 2025-01-01 RX ADMIN — Medication 125 MILLILITER(S): at 04:00

## 2025-01-01 RX ADMIN — ACETAMINOPHEN 650 MILLIGRAM(S): 80 SOLUTION/ DROPS ORAL at 23:40

## 2025-01-01 RX ADMIN — CALCIUM GLUCONATE 200 GRAM(S): 94 INJECTION, SOLUTION INTRAVENOUS at 03:53

## 2025-01-01 RX ADMIN — Medication 4: at 17:41

## 2025-01-01 RX ADMIN — Medication 0.25 MILLIGRAM(S): at 15:59

## 2025-01-01 RX ADMIN — Medication 100 MILLIGRAM(S): at 21:57

## 2025-01-01 RX ADMIN — SODIUM CHLORIDE 50 MILLILITER(S): 9 INJECTION, SOLUTION INTRAMUSCULAR; INTRAVENOUS; SUBCUTANEOUS at 17:40

## 2025-01-01 RX ADMIN — ACETAMINOPHEN 1000 MILLIGRAM(S): 80 SOLUTION/ DROPS ORAL at 01:20

## 2025-01-01 RX ADMIN — HYDRALAZINE HYDROCHLORIDE 10 MILLIGRAM(S): 10 TABLET ORAL at 08:09

## 2025-01-01 RX ADMIN — CHLORHEXIDINE GLUCONATE 1 APPLICATION(S): 1.2 RINSE ORAL at 10:58

## 2025-01-01 RX ADMIN — HYDROXYUREA 2000 MILLIGRAM(S): 500 CAPSULE ORAL at 21:21

## 2025-01-01 RX ADMIN — CALCIUM GLUCONATE 200 GRAM(S): 94 INJECTION, SOLUTION INTRAVENOUS at 17:38

## 2025-01-01 RX ADMIN — Medication 100 MILLIGRAM(S): at 21:21

## 2025-01-01 RX ADMIN — Medication 1: at 20:59

## 2025-01-01 RX ADMIN — SODIUM PHOSPHATE, MONOBASIC, MONOHYDRATE AND SODIUM PHOSPHATE, DIBASIC ANHYDROUS 63.75 MILLIMOLE(S): 142; 276 INJECTION, SOLUTION INTRAVENOUS at 23:50

## 2025-01-01 RX ADMIN — HYDROXYUREA 2000 MILLIGRAM(S): 500 CAPSULE ORAL at 14:10

## 2025-01-01 RX ADMIN — Medication 100 MILLIGRAM(S): at 06:32

## 2025-01-01 RX ADMIN — Medication 0.5 MILLIGRAM(S): at 10:15

## 2025-01-01 RX ADMIN — Medication 4: at 05:40

## 2025-01-01 RX ADMIN — Medication 0.25 MILLIGRAM(S): at 14:10

## 2025-01-01 RX ADMIN — ACETAMINOPHEN 650 MILLIGRAM(S): 80 SOLUTION/ DROPS ORAL at 12:25

## 2025-01-01 RX ADMIN — CHLORHEXIDINE GLUCONATE 1 APPLICATION(S): 1.2 RINSE ORAL at 08:56

## 2025-01-01 RX ADMIN — FENTANYL 25 MICROGRAM(S): 75 PATCH, EXTENDED RELEASE TRANSDERMAL at 19:06

## 2025-01-01 RX ADMIN — Medication 50 MILLIGRAM(S): at 16:00

## 2025-01-01 RX ADMIN — ACETAMINOPHEN 650 MILLIGRAM(S): 80 SOLUTION/ DROPS ORAL at 03:53

## 2025-01-01 RX ADMIN — Medication 3 UNIT(S): at 11:56

## 2025-01-01 RX ADMIN — Medication 15 MILLILITER(S): at 21:50

## 2025-01-01 RX ADMIN — SODIUM CHLORIDE 250 MILLILITER(S): 9 INJECTION, SOLUTION INTRAVENOUS at 08:52

## 2025-01-01 RX ADMIN — LABETALOL HCL 10 MILLIGRAM(S): 300 TABLET, FILM COATED ORAL at 17:38

## 2025-01-01 RX ADMIN — VANCOMYCIN HYDROCHLORIDE 250 MILLIGRAM(S): 5 INJECTION, POWDER, LYOPHILIZED, FOR SOLUTION INTRAVENOUS at 00:56

## 2025-01-01 RX ADMIN — POTASSIUM CHLORIDE 100 MILLIEQUIVALENT(S): 600 TABLET, FILM COATED, EXTENDED RELEASE ORAL at 21:01

## 2025-01-01 RX ADMIN — POTASSIUM CHLORIDE 100 MILLIEQUIVALENT(S): 600 TABLET, FILM COATED, EXTENDED RELEASE ORAL at 23:55

## 2025-01-01 RX ADMIN — Medication 5 MILLIGRAM(S): at 22:00

## 2025-01-01 RX ADMIN — HYDROXYUREA 2000 MILLIGRAM(S): 500 CAPSULE ORAL at 18:19

## 2025-01-01 RX ADMIN — HYDROXYUREA 2000 MILLIGRAM(S): 500 CAPSULE ORAL at 05:54

## 2025-01-01 RX ADMIN — ACETAMINOPHEN 650 MILLIGRAM(S): 80 SOLUTION/ DROPS ORAL at 22:25

## 2025-01-01 RX ADMIN — Medication 0.5 MILLIGRAM(S): at 17:56

## 2025-01-07 NOTE — H&P ADULT - NSHPPHYSICALEXAM_GEN_ALL_CORE
PHYSICAL EXAM:  GENERAL: NAD, well-developed  HEAD:  Atraumatic, Normocephalic  EYES: EOMI, PERRLA, conjunctiva and sclera clear  NECK: Supple, No JVD  CHEST/LUNG: Clear to auscultation bilaterally; No wheeze  HEART: Regular rate and rhythm; No murmurs, rubs, or gallops  ABDOMEN: Soft, Nontender, Nondistended; Bowel sounds present  EXTREMITIES:  2+ Peripheral Pulses, No clubbing, cyanosis, or edema  PSYCH: AAOx3  NEUROLOGY: non-focal  SKIN: No rashes or lesions T(C): 37.7 (01-07-25 @ 19:32), Max: 37.7 (01-07-25 @ 18:40)  HR: 95 (01-07-25 @ 22:30) (80 - 95)  BP: 198/84 (01-07-25 @ 22:30) (154/67 - 198/84)  RR: 22 (01-07-25 @ 22:30) (16 - 22)  SpO2: 96% (01-07-25 @ 22:30) (94% - 98%)    CONSTITUTIONAL: well-nourished, well-groomed laying comfortably in bed in NAD  EYES: mild arcus senilis b/o, PERRLA and symmetric, EOMI, No conjunctival or scleral injection, non-icteric  ENMT: large nodular torus palatinus  NECK: R internal jugular catheter, soft, supple, trachea midline  RESP: No respiratory distress, no use of accessory muscles; CTA b/l in anterior lung fields no WRR  CV: tachycardic, normal S1/S2, normal rhythm  GI: obese, soft, nondistended, +TTP in LLQ, no rebound tenderness  MSK: Gait not assessed; strength 5/5 in b/l lower extremities  SKIN: No rashes or ulcers noted  NEURO: AOx4, nonfocal  PSYCH: mood and affect appropriate to situation

## 2025-01-07 NOTE — PATIENT PROFILE ADULT - FALL HARM RISK - HARM RISK INTERVENTIONS

## 2025-01-07 NOTE — CONSULT NOTE ADULT - SUBJECTIVE AND OBJECTIVE BOX
[facility] - Blessing Moore, Carmen, Jewel, Elsa, Meka, Calos; Office Number: [phone]    Initial Consult Note  CHIEF COMPLAINT: Patient is a 74y old  Female who presents with a chief complaint of fatigue  HPI: 73 y/o F PMH breast cancer in remission('99, '23),, colon cancer '01 s/p resection in remission, HTN, Hld, DM2, CAD status post CABG @ [facility] &, vertigo.  Presents to [place] ED 1/7 c/o generalized back pain x 3 months with some abdominal discomfort over the past 1 week. She has been feeling some profound weakness and fatigue and has been unable to carry on ADLs.  Over the past 3 days or so she has had some dyspnea on exertion. Patient is having some left upper leg discomfort.  No complaints of chest pain, palpitations decreased exercise tolerance, N/V, HA reported. No signs of orthopnea or PND  Allergies    penicillin (Hives)  shellfish (Hives)  sulfa drugs (Hives)    Intolerances      PAST MEDICAL & SURGICAL HISTORY:  Colon Cancer      Breast Ca  (L)      HTN (Hypertension)      Hyperlipidemia      Coronary artery disease      Diabetes mellitus       Vertigo      Renal insufficiency  per pt      History of Colon Resection  2001      S/P Breast Lumpectomy  (L) 1999      S/P CABG x 2  2010      H/O forearm fracture  1995 (R)        MEDICATIONS  (STANDING):    MEDICATIONS  (PRN):    FAMILY HISTORY:  FH: breast cancer  mother       No family history of acute MI or sudden cardiac death.    SOCIAL HISTORY: No active tobacco, ethanol, or drug abuse.    REVIEW OF SYSTEMS   All other review of systems is negative unless indicated above.    VITAL SIGNS:   Vital Signs Last 24 Hrs  T(C): 36.7 (07 Jan 2025 16:11), Max: 36.8 (07 Jan 2025 11:51)  T(F): 98 (07 Jan 2025 16:11), Max: 98.3 (07 Jan 2025 11:51)  HR: 80 (07 Jan 2025 16:11) (80 - 85)  BP: 154/67 (07 Jan 2025 16:11) (154/67 - 179/73)  BP(mean): --  RR: 18 (07 Jan 2025 16:11) (16 - 18)  SpO2: 97% (07 Jan 2025 16:11) (97% - 98%)    Parameters below as of 07 Jan 2025 16:11  Patient On (Oxygen Delivery Method): room air        Physical Exam:  Constitutional: NAD, awake and alert  HEENT: Moist Mucous Membranes, Anicteric  Pulmonary: Non-labored, breath sounds are clear bilaterally, No wheezing, rales or rhonchi  Cardiovascular: Regular, S1 and S2, No murmurs, No rubs, gallops or clicks  Gastrointestinal: Bowel Sounds present, soft, nontender.   Lymph: No peripheral edema. No lymphadenopathy.  Muscular skeletal:  L medial thigh palpible cord like structure from knee to mid thigh  Skin: No visible rashes or ulcers.  Psych:  Mood & affect appropriateI&O's Summary      LABS: All Labs Reviewed:                        9.5    280.33 )-----------( 43       ( 07 Jan 2025 13:46 )             28.8     07 Jan 2025 13:46    137    |  103    |  19     ----------------------------<  163    2.6     |  27     |  1.20     Ca    9.4        07 Jan 2025 13:46    TPro  8.6    /  Alb  3.5    /  TBili  0.6    /  DBili  x      /  AST  43     /  ALT  25     /  AlkPhos  100    07 Jan 2025 13:46    PT/INR - ( 07 Jan 2025 13:46 )   PT: 13.8 sec;   INR: 1.18 ratio         PTT - ( 07 Jan 2025 13:46 )  PTT:28.1 secTroponin I, High Sensitivity Result: 143.6 ng/L (01-07-25 @ 13:46)      12 Lead ECG:   Ventricular Rate 78 BPM    Atrial Rate 78 BPM    P-R Interval 182 ms    QRS Duration 140 ms    Q-T Interval 460 ms    QTC Calculation(Bazett) 524 ms    P Axis 66 degrees    R Axis 24 degrees    T Axis -3 degrees    Diagnosis Line Sinus rhythm with premature atrial complexes with aberrant conduction  Right bundle branch block  T wave abnormality, consider inferolateral ischemia  Abnormal ECG    Confirmed by Sherri Live ([id number]) on 1/7/2025 4:26:50 PM (01-07-25 @ 12:52)      EXAM:  ECHO TTE WO CON COMP W DOPP      PROCEDURE DATE:  Sep  8 2021   .      INTERPRETATION:  TRANSTHORACIC ECHOCARDIOGRAM REPORT        Patient Name:   DARÍO PADILLA Patient Location: Outpatient  Medical Rec #:  [id number]             Accession #:      [id number]  Account #:      [id number]             Height:           65.0 in 165.1 cm  YOB: 1950            Weight:           200.0 lb 90.72 kg  Patient Age:    71 years             BSA:              1.98 m²  Patient Gender: F                  BP:               140/90 mmHg      Date of Exam:        9/8/2021 9:35:07 AM  Sonographer:         Beto Deal  Referring Physician: Jollybrian [facility]  Copy report sent to: [id number] Estelle Hernández MD    Procedure:   2DEcho/Doppler/Color Doppler Complete.  Indications: Chest pain, unspecified - R07.9  Diagnosis:   Chest pain, unspecified - R07.9        2D AND M-MODE MEASUREMENTS (normal ranges within parentheses):  Left                 Normal   Aorta/Left Normal  Ventricle:                    Atrium:  IVSd (Mmode): 1.19  (0.7-1.1) Aortic Root    3.18  (2.4-3.7)                 cm             (Mmode):        cm  LVPWd         1.13  (0.7-1.1) LA Volume      52.4  (Mmode):       cm             Index    ml/m²  LVIDd         4.77  (3.4-5.7)  (Mmode):       cm  LVIDs         3.18  (Mmode):       cm  LV FS         33.3   (>25%)  (Mmode):        %  Rel. Wall     0.48   (<0.42)  Thickness Mm  LV Mass       104.8  Index: Mmode  g/m²    LV DIASTOLIC FUNCTION:  MV Peak E: 0.93 m/s e', MV Yue: 0.07 m/s  MV Peak A: 0.68 m/s E/e' Ratio: 13.29  E/A Ratio: 1.37    SPECTRAL DOPPLER ANALYSIS (where applicable):  Tricuspid Valve and PA/RV Systolic Pressure: TR Max Velocity: 2.40 m/s RA Pressure: 3 mmHg RVSP/PASP: 26.0 mmHg      PHYSICIAN INTERPRETATION:  Left Ventricle: The left ventricular internal cavity size is normal.  Global LV systolic function was normal. Left ventricular ejection fraction, by visual estimation, is 65 to 70%. Spectral Doppler shows impaired relaxation pattern of left ventricular myocardial filling (Grade I diastolic dysfunction).  Right Ventricle: The right ventricular size is normal. RV systolic function is normal.  Left Atrium: Severely enlarged left atrium.  Right Atrium: Normal right atrial size.  Pericardium: There is no evidence of pericardial effusion.  Mitral Valve: The mitral valve is normal in structure. Trace mitral valve regurgitation is seen.  Tricuspid Valve: The tricuspid valve is normal in structure. Trivial tricuspid regurgitation is visualized.  Aortic Valve: The aortic valve is trileaflet. Sclerotic aortic valve with normal opening. No evidence of aortic valve regurgitation is seen.  Pulmonic Valve: Structurally normal pulmonic valve, with normal leaflet excursion. Trace pulmonic valve regurgitation.  Aorta: The aortic root is normal in size and structure.  Pulmonary Artery: The main pulmonary artery is normal in size.  Venous: The inferior vena cava was normal sized, with respiratory size variation greater than 50%.      Summary:   1. Left ventricular ejection fraction, by visual estimation, is 65 to 70%.   2. Normal global left ventricular systolic function.   3. Spectral Doppler shows impaired relaxation pattern of left ventricular myocardial filling (Grade I diastolic dysfunction).   4. Trace mitral valve regurgitation.   5. Sclerotic aortic valve with normal opening.    MD Arnold Electronically signed on 9/8/2021 at 4:51:11 PM            *** Final ***              ESTELLE HERNÁNDEZ MD; Attending Cardiologist  This document has been electronically signed. Sep  8 2021  9:35AM

## 2025-01-07 NOTE — CONSULT NOTE ADULT - ASSESSMENT
Assessment: 74F PMHx left breast cancer, completely resected colon cancer in remission, CAD s/p CABG x2 in 2010, T2DM not on insulin, gout, HTN, HLD presented initially to [facility] with lower back pain. Transferred to [facility] for hyperleukocytosis with reported blasts concerning for new leukemia.  Patient presenting with WBC 298k and blast count approximately 90% noted on manual differential.  Peripheral smear reviewed: RBCs are normocytic, hypochromic. No basophilic stippling. Occasional nucleated RBCs are seen. Thrombocytopenia. Marked leukocytosis with predominantly large blasts (approximately 90%). No Bran rods appreciated.    Plan:  Therapeutic leukapheresis (1.5 TBV, 7500mL) with 5% albumin replacement fluid daily until WBC <100k (likely one procedure). Care discussed with clinical team. Central line placed. [facility] contacted and RN will perform procedure overnight.   Trend CBC, coags including fibrinogen, LDH, tumor lysis markers, iCa.

## 2025-01-07 NOTE — ED PROVIDER NOTE - PROGRESS NOTE DETAILS
Discussed with Dr. Tracy sommers, oncology.  She states that the patient will likely need to be transferred to [facility] for emergent oncology Patient doing well, no acute complaints at this time.  We will continue to monitor.  Discussed with [facility], Dr. Goldberg from the leukemia service, the patient is excepted to [facility] emergency department. Patient doing well, discussed with patient and the patient's significant other regarding the nature of her findings, and need for transfer to outside hospital. Patient doing well, no acute changes at this time.  Discussed with patient and patient's  regarding the nature of her findings, importance of close prompt transfer for oncology intervention.

## 2025-01-07 NOTE — ED PROVIDER NOTE - NSICDXFAMILYHX_GEN_ALL_CORE_FT
[facility] ambulatory encounter  URGENT CARE OFFICE VISIT    SUBJECTIVE:  Columba Carrasco is a 77 year old female who presented requesting evaluation for finger infection. She reports this started 3 days ago when she got a small abrasion on her thumb. She didn't think much of it but over the next 3 days it became more and more red and slightly tender and warm. She denies other symptoms or modulating factors.    Review of systems:    A review of systems was performed and findings relevant to these symptoms are included in the HPI.     PAST HISTORIES:    ALLERGIES:   Allergen Reactions   • Codeine GI UPSET       MEDICATIONS:  Current Outpatient Prescriptions   Medication Sig   • ranitidine (ZANTAC) 150 MG tablet Take one tablet by mouth nightly.   • NIFEdipine (PROCARDIA XL) 60 MG 24 hr tablet Take 1 tablet by mouth daily.   • citalopram (CELEXA) 20 MG tablet Take 1 tablet by mouth daily.   • chlorhexidine gluconate (PERIDEX) 0.12 % solution Rinse with 1/2 ounce (1 capful) 2 times daily for 30 seconds then expectorate, do not swallow.   • sodium fluoride (PREVIDENT) 0.2 % solution SWISH DAILY FOR 1 MINUTE THEN EXPECTORATE (NO FOOD OR DRINK FOR AT LEAST 30 MINUTES)   • triamcinolone (KENALOG) 0.1 % dental paste Take by mouth 3 times daily.   • omeprazole (PRILOSEC) 40 MG capsule Take 1 capsule by mouth daily.   • HYDROcodone-acetaminophen (NORCO) 5-325 MG per tablet Take 1-2 tablets by mouth every 6 hours as needed for Pain.   • ibuprofen (MOTRIN) 800 MG tablet Take 800 mg by mouth every 6 hours as needed for Pain.   • zolpidem (AMBIEN) 10 MG tablet Indications: Trouble Sleeping Take 1 tablet by mouth at bedtime as needed for sleep   • calcium citrate-vitamin D (CITRACAL+D) 315-250 MG-UNIT per tablet Take 1 tablet by mouth 3 times daily.   • DIAZepam (VALIUM) 10 MG tablet Take 1 tablet by mouth every 6 hours as needed for Anxiety.   • epoetin deon (PROCRIT) 25270 UNIT/ML injection Inject 10,000 Units into the skin every 21  days.   • lactase (LACTAID) 3000 UNIT tablet Take 1 tablet by mouth 3 times daily (with meals). Pt takes when needed.   • mupirocin (BACTROBAN) 2 % ointment Apply topically 3 times daily.   • cephALEXin (KEFLEX) 500 MG capsule Take 1 capsule by mouth 3 times daily for 7 days.   • Lactobacillus (FLORAJEN ACIDOPHILUS) capsule Take 1 capsule by mouth daily.   • predniSONE (DELTASONE) 20 MG tablet Take 2 tablets by mouth daily.   • pregabalin (LYRICA) 75 MG capsule Take 1 capsule by mouth daily.     No current facility-administered medications for this visit.        I have reviewed the past medical history, family history, social history, medications and allergies listed in the medical record as obtained by my nursing staff and support staff and agree with their documentation    OBJECTIVE:  Physical Exam:    Visit Vitals  /58 (BP Location: [facility], Patient Position: Sitting, Cuff Size: Regular)   Pulse 100   Temp 98.5 °F (36.9 °C) (Oral)   Ht 5' 4\" (1.626 m)   Wt 71.2 kg   BMI 26.95 kg/m²        CONSTITUTIONAL: Well-hydrated, well-nourished female who appears to be in no acute distress. Awake, alert and cooperative.  MUSCULOSKELETAL: FULL ACTIVE AND PASSIVE ROM without laxity or crepitance.   SKIN: Warm, dry, small slit superficial abrasion left thumb dorsum with surrounding 1\" of erythema without fluctuance. Distal perfusion symmetric.   NEUROLOGIC: Alert and oriented x3.  PSYCHIATRIC:  Speech and behavior appropriate. Normal mood and affect.    DIAGNOSTICS:  TD UTD      Assessment:  1. Cellulitis of finger of left hand    2. Finger abrasion, initial encounter      The patient had a small cut on the thumb which has become infected and will require topical and oral antibiotics with general cellulitis measures.     PLAN:  Orders Placed This Encounter   • mupirocin (BACTROBAN) 2 % ointment   • cephALEXin (KEFLEX) 500 MG capsule   • Lactobacillus (FLORAJEN ACIDOPHILUS) capsule       FOLLOW-UP:  PCP    PATIENT  INSTRUCTIONS:  ATTACHED    The patient was advised to follow up with primary physician or to recheck with the urgent care clinic sooner if symptoms get worse or if new symptoms appear.    The patient indicated understanding of the diagnosis and agreed with the plan of care.       FAMILY HISTORY:  FH: breast cancer, mother

## 2025-01-07 NOTE — ED ADULT NURSE NOTE - NSFALLUNIVINTERV_ED_ALL_ED
Bed/Stretcher in lowest position, wheels locked, appropriate side rails in place/Call bell, personal items and telephone in reach/Instruct patient to call for assistance before getting out of bed/chair/stretcher/Non-slip footwear applied when patient is off stretcher/[place] to call system/Physically safe environment - no spills, clutter or unnecessary equipment/Purposeful proactive rounding/Room/bathroom lighting operational, light cord in reach

## 2025-01-07 NOTE — H&P ADULT - HISTORY OF PRESENT ILLNESS
75 y/o F with PMHx breast cancer s/p lumpectomy, RT and chemo in 1999 w/ local recurrence in 2023 s/p resection; colon ca s/p resection in 2021, CAD s/p CABG x2 in 2010, HTN, HLD and T2DM who initially presented to [place] for lethargy x 1 month w/ exertional SOB, acute on chronic low back pain and new b/l leg pain. She was found to have  w/ 69% blasts, transferred to [facility] for hematology evaluation of new acute leukemia and blast crisis. Accepted directly to MICU for emergent leukapheresis.  73 y/o F with PMHx breast cancer s/p lumpectomy, RT and chemo in 1999 w/ local recurrence in 2023 s/p resection; colon ca s/p resection in 2021, CAD s/p CABG x2 in 2010, HTN, HLD and T2DM who initially presented to [place] for lethargy x 1 month w/ exertional SOB, acute on chronic low back pain and new b/l leg pain. She was found to have  w/ 69% blasts, transferred to [facility] for hematology evaluation of new acute leukemia and blast crisis.     Brief ED course:  VS: T 98.2, HR 86, /79, RR 16 spO2 94% on RA.  Labs: .33 1/ 69% blasts, Hgb 9.5, plt 43. K 2.6, trop 143.6, pro-BNP 1015  Imaging:CT lumbar spine showing L5-S1 diffuse disc bulging contributes to high-grade bilateral foraminal compromise without significant central canal compromise    Pt seen and evaluated by heme/onc in the ED, given allopurinol 300mg x1, rasburicase 3mg IV x1, hydroxyurea 200mg PO x1, 10 mEq K repletion. Pt was accepted directly to MICU for emergent leukapheresis.

## 2025-01-07 NOTE — CONSULT NOTE ADULT - SUBJECTIVE AND OBJECTIVE BOX
Patient is a 74y old Female who presents with a chief complaint of fatigue in the setting of hyperleukocytosis.      HPI:  73 y/o F with PMHx breast cancer s/p lumpectomy, RT and chemo in 1999 w/ local recurrence in 2023 s/p resection; colon ca s/p resection in 2021, CAD s/p CABG x2 in 2010, HTN, HLD and T2DM who initially presented to [place] for lethargy x 1 month w/ exertional SOB, acute on chronic low back pain and new b/l leg pain. She was found to have  w/ 69% blasts, transferred to [facility] for hematology evaluation of new acute leukemia and blast crisis.     Brief ED course:  VS: T 98.2, HR 86, /79, RR 16 spO2 94% on RA.  Labs: .33 1/ 69% blasts, Hgb 9.5, plt 43. K 2.6, trop 143.6, pro-BNP 1015  Imaging:CT lumbar spine showing L5-S1 diffuse disc bulging contributes to high-grade bilateral foraminal compromise without significant central canal compromise    Pt seen and evaluated by heme/onc in the ED, given allopurinol 300mg x1, rasburicase 3mg IV x1, hydroxyurea 200mg PO x1, 10 mEq K repletion. Pt was accepted directly to MICU for emergent leukapheresis.  (07 Jan 2025 21:38)      Female    74y    PAST MEDICAL & SURGICAL HISTORY:  Colon Cancer      Breast Ca  (L)      HTN (Hypertension)      Hyperlipidemia      Coronary artery disease      Diabetes mellitus      Vertigo      Renal insufficiency  per pt      History of Colon Resection  2001      S/P Breast Lumpectomy  (L) 1999      S/P CABG x 2  2010      H/O forearm fracture  1995 (R)          MEDICATIONS  (STANDING):  allopurinol 300 milliGRAM(s) Oral daily  chlorhexidine 2% Cloths 1 Application(s) Topical <User Schedule>  hydroxyurea 2000 milliGRAM(s) Oral two times a day      FAMILY HISTORY:  FH: breast cancer  mother          Allergies    penicillin (Hives)  sulfa drugs (Hives)  shellfish (Hives)      Vital Signs Last 24 Hrs  T(C): 37.2 (07 Jan 2025 22:30), Max: 37.7 (07 Jan 2025 18:40)  T(F): 99 (07 Jan 2025 22:30), Max: 99.9 (07 Jan 2025 19:32)  HR: 96 (07 Jan 2025 23:00) (80 - 96)  BP: 206/82 (07 Jan 2025 23:00) (154/67 - 206/82)  BP(mean): 118 (07 Jan 2025 23:00) (109 - 120)  RR: 33 (07 Jan 2025 23:00) (16 - 33)  SpO2: 98% (07 Jan 2025 23:00) (94% - 98%)    Parameters below as of 07 Jan 2025 22:30  Patient On (Oxygen Delivery Method): room air        Daily Height in cm: 165.1 (07 Jan 2025 22:30)      PHYSICAL EXAM: as per H&P                          9.3    298.64 )-----------( 38       ( 07 Jan 2025 18:51 )             29.5       Hematocrit: 29.5 % (01-07 @ 18:51)  Hematocrit: 28.8 % (01-07 @ 13:46)    01-07    142  |  99  |  18  ----------------------------<  133[H]  2.4[LL]   |  25  |  1.00    Ca    9.4      07 Jan 2025 18:51  Phos  2.6     01-07  Mg     1.6     01-07    TPro  8.5[H]  /  Alb  4.2  /  TBili  0.7  /  DBili  x   /  AST  37  /  ALT  18  /  AlkPhos  100  01-07        Lactate Dehydrogenase, Serum: 528 U/L (01-07 @ 18:51)          PT/INR - ( 07 Jan 2025 18:51 )   PT: 13.5 sec;   INR: 1.18 ratio         PTT - ( 07 Jan 2025 18:51 )  PTT:26.3 sec

## 2025-01-07 NOTE — H&P ADULT - ASSESSMENT
75 y/o F with PMH breast cancer and colon cancer s/p resection and in remission, CAD s/p CABG x2, T2DM, gout, HTN, HLD directly admitted to MICU after presenting to [facility] w/ hyperleukocytosis w/ blast crises c/f new leukemia.    NEURO:   - ?currently at baseline A&O x4     PULM:  - no active issues at this time     CV:  #Elevated Troponin  - likely in setting of demand ischemia, pt w/o chest pain currently  - trop 57, EKG w/o changes  - trend trop to peak    #CAD s/p CABG (2010)   - home meds    GI:   - NPO for shiley placement  - no active issues     /RENAL:   #Hypokalemia  - replete for K>4, Phos >3, Mg >2    ID:   #Leukocytosis  - likely 2/2 to blast crisis  - will r/o infectious etiology w/ U/A, UCX, RVP, CXR, blood cx x2   - hold off on abx at this time  - trend fever curve     HEME/ONC:   #Leukostasis  #Blast crisis   #Acute leukemia   - .33, blasts 69%, peripheral smear w/ almost entirely blast-like cells w/o Bran rods  - ddimer 17,476  - uric acid 9.7 on admission, now s/p 3mg IV rasburicase x1 and started allopurinol 300mg qd for TLS ppx  - Per heme if uric acid >8, give rasburicase x1; if >12 give 6mg IV x1  - c/w hydroxyurea 2000 mg BID for cytoreduction  - will place shiley for emergent leukapheresis   - per heme recs: trend cbc w/ diff, coags, fibrinogen clauss, d-dimer and TLS labs (uric acid, LDH, mg, phos) daily  - Heme onc following, recs appreciated    #Anemia  #Thrombocytopenia   - type and screen  - transfuse for Hgb >7, Plt<10k, <20 w/ fever< 50k w/ bleed     #Breast ca  - s/p left breast lumpectomy in 1999    #Colon ca    - s/p resection in 2001     #DVT Ppx:  - holding for shiley placement   75 y/o F with PMH breast cancer and colon cancer s/p resection and in remission, CAD s/p CABG x2, T2DM, gout, HTN, HLD directly admitted to MICU after presenting to [facility] w/ hyperleukocytosis w/ blast crises c/f new leukemia.    NEURO:   - currently at baseline A&O x4     PULM:  - no active issues at this time     CV:  #Elevated Troponin  - likely in setting of demand ischemia, pt w/o chest pain currently  - trop 57, EKG w/o changes  - trend trop to peak    #CAD s/p CABG (2010)   - home meds    GI:   - NPO for shiley placement  - no active issues     /RENAL:   #Hypokalemia  - replete for K>4, Phos >3, Mg >2    ID:   #Leukocytosis  - likely 2/2 to blast crisis  - will r/o infectious etiology w/ U/A, UCX, RVP, CXR, blood cx x2   - hold off on abx at this time  - trend fever curve     HEME/ONC:   #Leukostasis  #Blast crisis   #Acute leukemia   #Anemia  #Thrombocytopenia   #Breast ca  #Colon ca    - s/p left breast lumpectomy in 1999  - s/p resection in 2001   - uric acid 9.7, ddimer 17,476 .33, blasts 69% on admission; peripheral smear w/ almost entirely blast-like cells w/o Bran rods  - s/p shiley placement for emergent leukapheresis   - s/p 3mg IV rasburicase x1 in ED       - per heme if uric acid >8, give rasburicase x1; if >12 give 6mg IV x1  - c/w allopurinol 300mg qd for TLS ppx (uptitrated from home dose of 100mg QD)  - c/w hydroxyurea 2000 mg BID for cytoreduction  - per heme recs: trend cbc w/ diff, coags, fibrinogen clauss, d-dimer and TLS labs (uric acid, LDH, mg, phos) daily  - Heme onc following, recs appreciated  - maintain active type and screen  - transfuse for Hgb >7, Plt<10k, <20 w/ fever< 50k w/ bleed     #DVT Ppx:  - holding for shiley placement

## 2025-01-07 NOTE — ED ADULT NURSE NOTE - OBJECTIVE STATEMENT
Pt. received alert and oriented x4 with chief complaint of one week of left lower back pain radiating down left leg w/ generalized abdominal discomfort and a few days of cough and body chills. Pt. also states SOB on exertion.

## 2025-01-07 NOTE — ED PROVIDER NOTE - DIFFERENTIAL DIAGNOSIS
Rule out acute pneumonia, electrolyte abnormality, leukocytosis, acute cardiac injury, acute intra-abdominal pathology, back injury, DVT, other acute pathology Differential Diagnosis

## 2025-01-07 NOTE — CONSULT NOTE ADULT - NS ATTEND AMEND GEN_ALL_CORE FT
73 y/o F PMH breast cancer in remission('99, '23), colon cancer '01 s/p resection in remission, HTN, HLD, DM2, CAD status post CABG @ [facility] &, vertigo.    Presents to [place] ED 1/7 c/o generalized back pain x 3 months with some abdominal discomfort over the past 1 week. She has been feeling some profound weakness and fatigue and has been unable to carry on ADLs.  Over the past 3 days or so she has had some dyspnea on exertion. Patient is having some left upper leg discomfort.      - mild troponin elevation, though no suggestion of acs  - ekg with sr, rbbb, nsst abn  - without cp or dyspnea during my exam  - trend troponin until peak  - found to have significantly elevated WBC at >280k  - troponin elevation likely in the setting of hematological issue/possible leukemia  - bnp elevated, though no does not appear volume overloaded  - replete K  - to be transferred to  for further heme/onc evaluation  - will follow with you

## 2025-01-07 NOTE — CONSULT NOTE ADULT - ASSESSMENT
75 y/o F PMH breast cancer in remission('99, '23),, colon cancer '01 s/p resection in remission, HTN, Hld, DM2, CAD status post CABG @ [facility] &, vertigo.  Presents to [place] ED 1/7 c/o generalized back pain x 3 months with some abdominal discomfort over the past 1 week. She has been feeling some profound weakness and fatigue and has been unable to carry on ADLs.  Over the past 3 days or so she has had some dyspnea on exertion. Patient is having some left upper leg discomfort.        -there is no evidence of acute ischemia.  - Abnormal cardiac biomarkers  not consistent with ACS, more likely from demand   - she has no anginal complaints  -ECG NSR w/ Pacs RBBB relatively unchanged from prior ECGs w/o ischemic changes   -pt w/ significant leukocytosis pt to be transferred to [facility] for hem onc workup will continue to see @ [facility]  -there is no evidence of significant arrhythmia.    -there is no evidence for meaningful  volume overload.    -further plan pending clinical course and results of above   -Pt is hemodynamically stable  -Monitor and replete lytes, keep K>4 and Mg >2  Thank you for the consult  Will continue to follow  Erlin Richey DNP, [facility]  Cardiology   Call Teams

## 2025-01-07 NOTE — ED ADULT TRIAGE NOTE - HEIGHT IN FEET
[FreeTextEntry1] : Overweight, elderly female doing well s/p TAVR & left radial pseudoaneurysm repair.\par \par Problems:\par 1. HTN/HLD/CHF/AS (s/p AVR years ago) now s/p ROSLYN TAVR (Sheri valve)\par c/w daily weights, temps and showers.\par Continue with current meds: ASA, Atorvastatin, KCL, Lasix & ferrous sulfate for post-op anemia.\par Ambulate as tolerated.\par PT\par Diet- low salt, low fat.\par f/u appts - CTS, Cardiology & PCP.\par 1 month f/u with structural heart for echocardiogram.\par [facility] team will continue to f/u with pt status. \par Pt agrees to call with any questions, issues or concerns.\par \par \par 2. Left radial pseudoaneurysm repair\par Monitor for change in NV status\par Keep LUE elevated to decrease swelling.\par Monitor for s/s infection to site.\par OT/hand exercises.\par f/u with vascular.\par \par 3. Diabetes Insipidus\par c/w Desmopressin as prescribed.  5

## 2025-01-07 NOTE — ED PROVIDER NOTE - ATTENDING CONTRIBUTION TO CARE
74-year-old female with a history of breast cancer in remission, colon cancer in remission, hypertension, hyperlipidemia, type 2 diabetes, CAD status post CABG, vertigo present who presents as a transfer from [place] due to new diagnosis of anemia and blast crisis.  Patient already discussed with heme-onc awaiting final dispo MICU has already been involved for exchange transfusion versus medical admission.  Labs are reordered noted to have an elevated troponin no active chest pain no EKG changes to trended at this time as well. just c/o lower back with sciatica history.  5/5 strength lower ext, no changes in bowel or bladder.

## 2025-01-07 NOTE — PROGRESS NOTE ADULT - ASSESSMENT
73 y/o F PMH breast cancer in remission('99, '23),, colon cancer '01 s/p resection in remission, HTN, Hld, DM2, CAD status post CABG @ [facility] &, vertigo.  Presents to [place] ED 1/7 c/o generalized back pain x 3 months with some abdominal discomfort over the past 1 week. She has been feeling some profound weakness and fatigue and has been unable to carry on ADLs.  Over the past 3 days or so she has had some dyspnea on exertion. Patient is having some left upper leg discomfort.        -there is no evidence of acute ischemia.  - Abnormal cardiac biomarkers  not consistent with ACS, more likely from demand   - she has no anginal complaints  -ECG NSR w/ Pacs RBBB relatively unchanged from prior ECGs w/o ischemic changes   -pt w/ significant leukocytosis pt to be transferred to [facility] for hem onc workup will continue to see @ [facility]  -there is no evidence of significant arrhythmia.    -there is no evidence for meaningful  volume overload.    -further plan pending clinical course and results of above   -Pt is hemodynamically stable  -Monitor and replete lytes, keep K>4 and Mg >2  Thank you for the consult  Will continue to follow  Erlin Richey DNP, [facility]  Cardiology   Call Teams

## 2025-01-07 NOTE — ED PROVIDER NOTE - OBJECTIVE STATEMENT
74-year-old female with a history of breast cancer in remission, colon cancer in remission, hypertension, hyperlipidemia, type 2 diabetes, CAD status post CABG, vertigo presents with having some generalized back pain with some abdominal discomfort over the past 1 week.  The patient states she has been feeling some generalized fatigue and weakness.  Over the past 3 days or so she has had some dyspnea on exertion.  No acute chest pain.  No known fever or chills.  No cough/URI.  No recent fall or trauma.  Patient is having some left upper leg discomfort today.  No rash.  No numbness or tingling.  No focal weakness.  No aggravating or alleviating factors otherwise noted.  No other acute injury or complaints.

## 2025-01-07 NOTE — ED PROVIDER NOTE - CARE PLAN
1 Principal Discharge DX:	Weakness   Principal Discharge DX:	Weakness  Secondary Diagnosis:	Leukocytosis, unspecified type   Principal Discharge DX:	Weakness  Secondary Diagnosis:	Blast cell leukemia

## 2025-01-07 NOTE — ED ADULT TRIAGE NOTE - CHIEF COMPLAINT QUOTE
Left lower back pain x 1.5 weeks along left leg and pain and discomfort. pt also c/o lethargy and no appetite and chills

## 2025-01-07 NOTE — CONSULT NOTE ADULT - SUBJECTIVE AND OBJECTIVE BOX
Hematology Consult Note    HPI:  74F hx left breast cancer, completely resected colon cancer in remission, CAD s/p CABG x2 in 2010, T2DM not on insulin, gout, HTN, HLD presented initially to [facility] with lower back pain. Transferred to [facility] for hyperleukocytosis with reported blasts concerning for new leukemia. She has sharp pains of the muscles of both sides of the lower back and in both legs. Also noted worsening shortness of breath with exertion (but not at rest) over the past 2 months. Around the recent holiday season, she accidentally cut her finger and it took much longer to stop bleeding.    Her breast cancer was diagnosed in 1999. S/p lumpectomy, RT, and chemo (unclear what regimen) with local recurrence in 2023 s/p resection and could not tolerate anastrozole. Her colon cancer was diagnosed and completed resected in 2001. She did not receive any adjuvant chemo for the colon cancer.       REVIEW OF SYSTEMS:  CONSTITUTIONAL: No weakness, fevers or chills  EYES/ENT: No visual changes;  No vertigo or throat pain   NECK: No pain or stiffness  RESPIRATORY: +Shortness of breath with exertion. No cough, wheezing, hemoptysis  CARDIOVASCULAR: No chest pain or palpitations  GASTROINTESTINAL: No abdominal or epigastric pain. No nausea, vomiting, or hematemesis; No diarrhea or constipation. No melena or hematochezia.  GENITOURINARY: No dysuria, frequency or hematuria  MUSCULOSKELETAL: +Sharp pains in muscles of both sides of the lower back and in both legs.  NEUROLOGICAL: No numbness or weakness  SKIN: No itching, burning, rashes, or lesions   All other review of systems is negative unless indicated above.    PAST MEDICAL & SURGICAL HISTORY:  Colon Cancer    Breast Ca  (L) s/p lumpectomy, RT and chemo 1999      HTN (Hypertension)      Hyperlipidemia      Coronary artery disease      Diabetes mellitus      Vertigo      Renal insufficiency  per pt      History of Colon Resection  2001      S/P Breast Lumpectomy  (L) 1999      S/P CABG x 2  2010      H/O forearm fracture  1995 (R)    FAMILY HISTORY:  FH: breast cancer  mother      SOCIAL HISTORY: Never smoked tobacco. Prior social EtOH consumption but none in the last 5 years. No illicit drug use. Lives at home with her . She has a daughter and three sons. All her children except one son (in [place]) live around [facility] and [place].    Allergies  penicillin (Hives)  sulfa drugs (Hives)  shellfish (Hives)    Intolerances        MEDICATIONS  (STANDING):  allopurinol 300 milliGRAM(s) Oral daily  hydroxyurea 2000 milliGRAM(s) Oral two times a day  potassium chloride  10 mEq/100 mL IVPB 10 milliEquivalent(s) IV Intermittent once    MEDICATIONS  (PRN):      OBJECTIVE   Height (cm): 165.1 (01-07 @ 17:13), 165.1 (01-07 @ 11:51)  Weight (kg): 93 (01-07 @ 11:51)  BMI (kg/m2): 34.1 (01-07 @ 17:13), 34.1 (01-07 @ 11:51)  BSA (m2): 2 (01-07 @ 17:13), 2 (01-07 @ 11:51)    T(F): 99.9 (01-07-25 @ 19:32), Max: 99.9 (01-07-25 @ 19:32)  HR: 82 (01-07-25 @ 19:32)  BP: 168/88 (01-07-25 @ 19:32)  RR: 18 (01-07-25 @ 19:32)  SpO2: 98% (01-07-25 @ 19:32)  Wt(kg): --    PHYSICAL EXAM   GENERAL: NAD, well-developed  HEAD:  Atraumatic, Normocephalic  EYES: EOMI, PERRLA, conjunctiva and sclera clear  CHEST/LUNG: Clear to auscultation bilaterally; No wheeze  HEART: Regular rate, irregularly irregular rhythm; III/VI crescendo-decrescendo murmur best auscultated at right second intercostal space  ABDOMEN: Soft, Nontender, Nondistended; Bowel sounds present  EXTREMITIES:  2+ Peripheral Pulses. Trace pitting edema b/l LE. No clubbing or cyanosis.  NEUROLOGY: non-focal  SKIN: No rashes or lesions                          9.3    298.64 )-----------( 38       ( 07 Jan 2025 18:51 )             29.5       01-07    142  |  99  |  18  ----------------------------<  133[H]  2.4[LL]   |  25  |  1.00    Ca    9.4      07 Jan 2025 18:51  Phos  2.6     01-07  Mg     1.6     01-07    TPro  8.5[H]  /  Alb  4.2  /  TBili  0.7  /  DBili  x   /  AST  37  /  ALT  18  /  AlkPhos  100  01-07      Phosphorus: 2.6 mg/dL (01-07 @ 18:51)  Magnesium: 1.6 mg/dL (01-07 @ 18:51)  Lactate Dehydrogenase, Serum: 528 U/L (01-07 @ 18:51)  Uric Acid: 9.7 mg/dL (01-07 @ 18:51)       Hematology Consult Note    HPI:  74F hx left breast cancer, completely resected colon cancer in remission, CAD s/p CABG x2 in 2010, T2DM not on insulin, gout, HTN, HLD presented initially to [facility] with lower back pain. Transferred to [facility] for hyperleukocytosis with reported blasts concerning for new leukemia. She has sharp pains of the muscles of both sides of the lower back and in both legs. Also noted worsening shortness of breath with exertion (but not at rest) over the past 2 months. Around the recent holiday season, she accidentally cut her finger and it took much longer to stop bleeding.    Her breast cancer was diagnosed in 1999. S/p lumpectomy, RT, and chemo (unclear what regimen) with local recurrence in 2023 s/p resection and could not tolerate anastrozole. Her colon cancer was diagnosed and completed resected in 2001. She did not receive any adjuvant chemo for the colon cancer. Of note, she had been seeing Dr. Pantoja at the [facility] hematology office until she retired. In 2019 and 2020, two peripheral blood flow cytometry tests were sent. Patient attributes both to part of the workup for a high total protein level. She did have a mild elevation of IgG but the immunoelectrophoresis was otherwise unremarkable. 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      REVIEW OF SYSTEMS:  CONSTITUTIONAL: No weakness, fevers or chills  EYES/ENT: No visual changes;  No vertigo or throat pain   NECK: No pain or stiffness  RESPIRATORY: +Shortness of breath with exertion. No cough, wheezing, hemoptysis  CARDIOVASCULAR: No chest pain or palpitations  GASTROINTESTINAL: No abdominal or epigastric pain. No nausea, vomiting, or hematemesis; No diarrhea or constipation. No melena or hematochezia.  GENITOURINARY: No dysuria, frequency or hematuria  MUSCULOSKELETAL: +Sharp pains in muscles of both sides of the lower back and in both legs.  NEUROLOGICAL: No numbness or weakness  SKIN: No itching, burning, rashes, or lesions   All other review of systems is negative unless indicated above.    PAST MEDICAL & SURGICAL HISTORY:  Colon Cancer    Breast Ca  (L) s/p lumpectomy, RT and chemo 1999      HTN (Hypertension)      Hyperlipidemia      Coronary artery disease      Diabetes mellitus      Vertigo      Renal insufficiency  per pt      History of Colon Resection  2001      S/P Breast Lumpectomy  (L) 1999      S/P CABG x 2  2010      H/O forearm fracture  1995 (R)    FAMILY HISTORY:  FH: breast cancer  mother      SOCIAL HISTORY: Never smoked tobacco. Prior social EtOH consumption but none in the last 5 years. No illicit drug use. Lives at home with her . She has a daughter and three sons. All her children except one son (in [place]) live around [facility] and [place].    Allergies  penicillin (Hives)  sulfa drugs (Hives)  shellfish (Hives)    Intolerances        MEDICATIONS  (STANDING):  allopurinol 300 milliGRAM(s) Oral daily  hydroxyurea 2000 milliGRAM(s) Oral two times a day  potassium chloride  10 mEq/100 mL IVPB 10 milliEquivalent(s) IV Intermittent once    MEDICATIONS  (PRN):      OBJECTIVE   Height (cm): 165.1 (01-07 @ 17:13), 165.1 (01-07 @ 11:51)  Weight (kg): 93 (01-07 @ 11:51)  BMI (kg/m2): 34.1 (01-07 @ 17:13), 34.1 (01-07 @ 11:51)  BSA (m2): 2 (01-07 @ 17:13), 2 (01-07 @ 11:51)    T(F): 99.9 (01-07-25 @ 19:32), Max: 99.9 (01-07-25 @ 19:32)  HR: 82 (01-07-25 @ 19:32)  BP: 168/88 (01-07-25 @ 19:32)  RR: 18 (01-07-25 @ 19:32)  SpO2: 98% (01-07-25 @ 19:32)  Wt(kg): --    PHYSICAL EXAM   GENERAL: NAD, well-developed  HEAD:  Atraumatic, Normocephalic  EYES: EOMI, PERRLA, conjunctiva and sclera clear  CHEST/LUNG: Clear to auscultation bilaterally; No wheeze  HEART: Regular rate, irregularly irregular rhythm; III/VI crescendo-decrescendo murmur best auscultated at right second intercostal space  ABDOMEN: Soft, Nontender, Nondistended; Bowel sounds present  EXTREMITIES:  2+ Peripheral Pulses. Trace pitting edema b/l LE. No clubbing or cyanosis.  NEUROLOGY: non-focal  SKIN: No rashes or lesions                          9.3    298.64 )-----------( 38       ( 07 Jan 2025 18:51 )             29.5       01-07    142  |  99  |  18  ----------------------------<  133[H]  2.4[LL]   |  25  |  1.00    Ca    9.4      07 Jan 2025 18:51  Phos  2.6     01-07  Mg     1.6     01-07    TPro  8.5[H]  /  Alb  4.2  /  TBili  0.7  /  DBili  x   /  AST  37  /  ALT  18  /  AlkPhos  100  01-07      Phosphorus: 2.6 mg/dL (01-07 @ 18:51)  Magnesium: 1.6 mg/dL (01-07 @ 18:51)  Lactate Dehydrogenase, Serum: 528 U/L (01-07 @ 18:51)  Uric Acid: 9.7 mg/dL (01-07 @ 18:51)

## 2025-01-07 NOTE — ED PROVIDER NOTE - CLINICAL SUMMARY MEDICAL DECISION MAKING FREE TEXT BOX
74-year-old female with a history of breast cancer in remission, colon cancer in remission, hypertension, hyperlipidemia, type 2 diabetes, CAD status post CABG, vertigo present who presents as a transfer from [place] due to new diagnosis of anemia and blast crisis.  Patient endorses that she went to [place] because she has been lethargic for the past month with exertional shortness of breath and some lower back pain.  Denies any fevers, nausea vomiting, dysuria, hematuria.  She also denies chest pain or shortness of breath at rest..    On physical exam patient appears well lungs clear to auscultation bilaterally heart rate regular rate and rhythm no murmurs rubs or gallops, abdomen soft nontender, no lower limb edema.    Labs from [place] show a white count of 286, concern for blast crisis, plan to reach out to hematology oncology to determine need for more laboratory testing here before possible admission to MICU for leukapheresis.  Patient also had elevated tropes at [place], was evaluated by cardiology, determined to be demand ischemia, low suspicion of ACS, repeat troponin, EKG.  Patient will be an admission.

## 2025-01-07 NOTE — ED ADULT NURSE REASSESSMENT NOTE - NS ED NURSE REASSESS COMMENT FT1
pt taken up to MICU bed 37 on zoll monitor, with cardiac box, EDT and MD at bedside, pt on no drips or meds currently, pt comfort and safety secured

## 2025-01-07 NOTE — PROGRESS NOTE ADULT - SUBJECTIVE AND OBJECTIVE BOX
[facility] - Blessing Moore, Carmen, Jewel, Elsa, Meka, Calos; Office Number: [phone]    Initial Consult Note  CHIEF COMPLAINT: Patient is a 74y old  Female who presents with a chief complaint of weakness, back pain and AQUINO  HPI: 73 y/o F PMH breast cancer in remission('99, '23),, colon cancer '01 s/p resection in remission, HTN, Hld, DM2, CAD status post CABG @ [facility] &, vertigo.  Presents to [place] ED 1/7 c/o generalized back pain x 3 months with some abdominal discomfort over the past 1 week. She has been feeling some profound weakness and fatigue and has been unable to carry on ADLs.  Over the past 3 days or so she has had some dyspnea on exertion. Patient is having some left upper leg discomfort.  No complaints of chest pain, palpitations decreased exercise tolerance, N/V, HA reported. No signs of orthopnea or PND.     Allergies    penicillin (Hives)  shellfish (Hives)  sulfa drugs (Hives)    Intolerances    PAST MEDICAL & SURGICAL HISTORY:  Colon Cancer    Breast Ca  (L)    HTN (Hypertension)    Hyperlipidemia    Coronary artery disease    Diabetes mellitus    Vertigo    Renal insufficiency  per pt    History of Colon Resection  2001    S/P Breast Lumpectomy  (L) 1999    S/P CABG x 2  2010    H/O forearm fracture  1995 (R)      MEDICATIONS  (STANDING):    MEDICATIONS  (PRN):    FAMILY HISTORY:  FH: breast cancer  mother       No family history of acute MI or sudden cardiac death.    SOCIAL HISTORY: No active tobacco, ethanol, or drug abuse.    REVIEW OF SYSTEMS   All other review of systems is negative unless indicated above.    VITAL SIGNS:   Vital Signs Last 24 Hrs  T(C): 36.8 (07 Jan 2025 11:51), Max: 36.8 (07 Jan 2025 11:51)  T(F): 98.3 (07 Jan 2025 11:51), Max: 98.3 (07 Jan 2025 11:51)  HR: 85 (07 Jan 2025 11:51) (85 - 85)  BP: 179/73 (07 Jan 2025 11:51) (179/73 - 179/73)  BP(mean): --  RR: 16 (07 Jan 2025 11:51) (16 - 16)  SpO2: 98% (07 Jan 2025 11:51) (98% - 98%)    Parameters below as of 07 Jan 2025 11:51  Patient On (Oxygen Delivery Method): room air        Physical Exam:  Constitutional: NAD, awake and alert  HEENT: Moist Mucous Membranes, Anicteric  Pulmonary: Non-labored, breath sounds are clear bilaterally, No wheezing, rales or rhonchi  Cardiovascular: Regular, S1 and S2, No murmurs, No rubs, gallops or clicks  Gastrointestinal: Bowel Sounds present, soft, nontender.   Lymph: No peripheral edema. No lymphadenopathy.  Muscular skeletal:  L medial thigh palpible cord like structure from knee to mid thigh  Skin: No visible rashes or ulcers.  Psych:  Mood & affect appropriate    I&O's Summary      LABS: All Labs Reviewed:                        9.5    280.33 )-----------( 43       ( 07 Jan 2025 13:46 )             28.8     07 Jan 2025 13:46    137    |  103    |  19     ----------------------------<  163    2.6     |  27     |  1.20     Ca    9.4        07 Jan 2025 13:46    TPro  8.6    /  Alb  3.5    /  TBili  0.6    /  DBili  x      /  AST  43     /  ALT  25     /  AlkPhos  100    07 Jan 2025 13:46    PT/INR - ( 07 Jan 2025 13:46 )   PT: 13.8 sec;   INR: 1.18 ratio         PTT - ( 07 Jan 2025 13:46 )  PTT:28.1 secTroponin I, High Sensitivity Result: 143.6 ng/L (01-07-25 @ 13:46)          EXAM:  ECHO TTE WO CON COMP W DOPP      PROCEDURE DATE:  Sep  8 2021   .      INTERPRETATION:  TRANSTHORACIC ECHOCARDIOGRAM REPORT        Patient Name:   DARÍO PADILLA Patient Location: Outpatient  Medical Rec #:  [id number]             Accession #:      [id number]  Account #:      [id number]             Height:           65.0 in 165.1 cm  YOB: 1950            Weight:           200.0 lb 90.72 kg  Patient Age:    71 years             BSA:              1.98 m²  Patient Gender: F                  BP:               140/90 mmHg      Date of Exam:        9/8/2021 9:35:07 AM  Sonographer:         Beto Deal  Referring Physician: [facility]  Copy report sent to: [id number] Estelle Hernández MD    Procedure:   2DEcho/Doppler/Color Doppler Complete.  Indications: Chest pain, unspecified - R07.9  Diagnosis:   Chest pain, unspecified - R07.9        2D AND M-MODE MEASUREMENTS (normal ranges within parentheses):  Left                 Normal   Aorta/Left Normal  Ventricle:                    Atrium:  IVSd (Mmode): 1.19  (0.7-1.1) Aortic Root    3.18  (2.4-3.7)                 cm             (Mmode):        cm  LVPWd         1.13  (0.7-1.1) LA Volume      52.4  (Mmode):       cm             Index    ml/m²  LVIDd         4.77  (3.4-5.7)  (Mmode):       cm  LVIDs         3.18  (Mmode):       cm  LV FS         33.3   (>25%)  (Mmode):        %  Rel. Wall     0.48   (<0.42)  Thickness Mm  LV Mass       104.8  Index: Mmode  g/m²    LV DIASTOLIC FUNCTION:  MV Peak E: 0.93 m/s e', MV Yue: 0.07 m/s  MV Peak A: 0.68 m/s E/e' Ratio: 13.29  E/A Ratio: 1.37    SPECTRAL DOPPLER ANALYSIS (where applicable):  Tricuspid Valve and PA/RV Systolic Pressure: TR Max Velocity: 2.40 m/s RA Pressure: 3 mmHg RVSP/PASP: 26.0 mmHg      PHYSICIAN INTERPRETATION:  Left Ventricle: The left ventricular internal cavity size is normal.  Global LV systolic function was normal. Left ventricular ejection fraction, by visual estimation, is 65 to 70%. Spectral Doppler shows impaired relaxation pattern of left ventricular myocardial filling (Grade I diastolic dysfunction).  Right Ventricle: The right ventricular size is normal. RV systolic function is normal.  Left Atrium: Severely enlarged left atrium.  Right Atrium: Normal right atrial size.  Pericardium: There is no evidence of pericardial effusion.  Mitral Valve: The mitral valve is normal in structure. Trace mitral valve regurgitation is seen.  Tricuspid Valve: The tricuspid valve is normal in structure. Trivial tricuspid regurgitation is visualized.  Aortic Valve: The aortic valve is trileaflet. Sclerotic aortic valve with normal opening. No evidence of aortic valve regurgitation is seen.  Pulmonic Valve: Structurally normal pulmonic valve, with normal leaflet excursion. Trace pulmonic valve regurgitation.  Aorta: The aortic root is normal in size and structure.  Pulmonary Artery: The main pulmonary artery is normal in size.  Venous: The inferior vena cava was normal sized, with respiratory size variation greater than 50%.      Summary:   1. Left ventricular ejection fraction, by visual estimation, is 65 to 70%.   2. Normal global left ventricular systolic function.   3. Spectral Doppler shows impaired relaxation pattern of left ventricular myocardial filling (Grade I diastolic dysfunction).   4. Trace mitral valve regurgitation.   5. Sclerotic aortic valve with normal opening.    MD Arnold Electronically signed on 9/8/2021 at 4:51:11 PM      *** Final ***        ESTELLE HERNÁNDEZ MD; Attending Cardiologist  This document has been electronically signed. Sep  8 2021  9:35AM

## 2025-01-07 NOTE — H&P ADULT - NSHPLABSRESULTS_GEN_ALL_CORE
.  LABS:                         9.3    298.64 )-----------( 38       ( 07 Jan 2025 18:51 )             29.5     01-07    142  |  99  |  18  ----------------------------<  133[H]  2.4[LL]   |  25  |  1.00    Ca    9.4      07 Jan 2025 18:51  Phos  2.6     01-07  Mg     1.6     01-07    TPro  8.5[H]  /  Alb  4.2  /  TBili  0.7  /  DBili  x   /  AST  37  /  ALT  18  /  AlkPhos  100  01-07    PT/INR - ( 07 Jan 2025 18:51 )   PT: 13.5 sec;   INR: 1.18 ratio         PTT - ( 07 Jan 2025 18:51 )  PTT:26.3 sec  Urinalysis Basic - ( 07 Jan 2025 18:51 )    Color: x / Appearance: x / SG: x / pH: x  Gluc: 133 mg/dL / Ketone: x  / Bili: x / Urobili: x   Blood: x / Protein: x / Nitrite: x   Leuk Esterase: x / RBC: x / WBC x   Sq Epi: x / Non Sq Epi: x / Bacteria: x            Lactate, Blood: 0.9 mmol/L (01-07 @ 13:46)      RADIOLOGY, EKG & ADDITIONAL TESTS: Reviewed.

## 2025-01-07 NOTE — H&P ADULT - ATTENDING COMMENTS
#Acute leukemia  #Leukocytosis  #Thrombocytopenia  History of breast cancer/malignant neoplasm of colon  #History of CABG  #Type 2 diabetes not on insulin  Patient presents as a transfer from [place] for concerns of acute leukemia due to white blood cell count of 280 and blasts 69%.  Patient mated to MICU for emergent leukapheresis.  Neuro/alert and oriented  Pulm/no active issues  CV-Suspect troponinemia from demand ischemia will trend to peak obtain echo  GI–no active issues  /renal–hypokalemia will replete  ID-no acute concern for infection however we will send blood cultures, urine cultures  Heme/onc-history of breast and colon cancer now with acute leukemia status post Shiley placement for emergent leukapheresis  Will trend TLS labs appreciate heme-onc rec    SCDs for DVT prophylaxis in light of thrombocytopenia  R IJ Shiley  DO not use L arm ( prior lymph node resection in 90s)  FULL CODE

## 2025-01-07 NOTE — CONSULT NOTE ADULT - ASSESSMENT
74F hx left breast cancer, completely resected colon cancer in remission, CAD s/p CABG x2 in 2010, T2DM not on insulin, gout, HTN, HLD presented initially to [facility] with lower back pain. Transferred to [facility] for hyperleukocytosis with reported blasts concerning for new leukemia.    # Acute Leukemia  - Patient presenting with .33 and blast 69% noted on manual differential  - Peripheral smear reviewed: RBCs are normocytic, hypochromic and have slight anisocytosis. Occasional target cells are seen. Slight polychromasia is noted. No basophilic stippling is seen. Occasional nucleated RBCs are seen. No Boss-Jolly bodies or siderotic granules are seen. WBCs appear markedly increased in number with almost entirely blast-like cells without Bran rods. Platelets appear decreased in number. Platelets appear normal morphologically with small forms.  - Ordered serum iron w/ TIBC, ferritin, B12 and folate level, haptoglobin, retic count, fibrinogen / PT / PTT / D-dimer, LDH / Uric acid level for TLS labs, G6PD level, HIV and Acute hepatitis panel (Need to order Hep B surface Ab and Hep B core Ab total SEPARATELY).  - Please obtain TTE or MUGA scan    - If uric acid > 8, give 3 mg IV Rasburicase x1; if >12 give 6 mg IV x1. Initial uric acid 9.7, ordered rasburicase 3 mg IV STAT after confirmed G6PD level was sent.  - Started allopurinol 300 mg qd as initial uric acid 9.7  - Started hydroxyurea 2000 mg BID for cytoreduction, but will need to increase to TID if leukapheresis not performed.  - Ongoing discussions with blood bank about leukapheresis. Her initial absolute blast count estimated to be 193 K/uL, well above 100 K/uL.    - Please check CBC with Diff, coags, fibrinogen Clauss, d-dimer and TLS labs (uric acid, LDH, Mg, Phos) DAILY  - Sent out flow cytometry on peripheral blood (personally confirmed tubes were given to lab and requisition form was given). Ordered STAT PML-SOULEYMANE, BCR-ABL, and FLT 3. Also ordered Onkosight Myeloid TL-95 STAT.  - Transfuse for Hgb < 7 and platelets < 10k, < 15k if febrile and < 50k if non-CNS bleeding    Plan discussed with Dr. Goldberg.      Elie Marshall MD  Hematology/Oncology Fellow PGY-6  Pager: [facility] [phone] / HUAN [phone]  After 5pm and on weekends please page on-call fellow  74F hx left breast cancer, completely resected colon cancer in remission, CAD s/p CABG x2 in 2010, T2DM not on insulin, gout, HTN, HLD presented initially to [facility] with lower back pain. Transferred to [facility] for hyperleukocytosis with reported blasts concerning for new leukemia.    # Acute Leukemia  - Patient presenting with .33 and blast 69% noted on manual differential.   - Peripheral smear reviewed: RBCs are normocytic, hypochromic and have slight anisocytosis. Occasional target cells are seen. Slight polychromasia is noted. No basophilic stippling is seen. Occasional nucleated RBCs are seen. No Boss-Jolly bodies or siderotic granules are seen. WBCs appear markedly increased in number with almost entirely blast-like cells without Bran rods. Platelets appear decreased in number. Platelets appear normal morphologically with small forms.  - Based on her prior two peripheral blood flow cytometry results in 2019 and 2020, have a slightly higher suspicion for a myelomonocytic leukemia.  - Ordered serum iron w/ TIBC, ferritin, B12 and folate level, haptoglobin, retic count, fibrinogen / PT / PTT / D-dimer, LDH / Uric acid level for TLS labs, G6PD level, HIV and Acute hepatitis panel (Need to order Hep B surface Ab and Hep B core Ab total SEPARATELY).  - Please obtain TTE or MUGA scan    - If uric acid > 8, give 3 mg IV Rasburicase x1; if >12 give 6 mg IV x1. Initial uric acid 9.7, ordered rasburicase 3 mg IV STAT after confirmed G6PD level was sent.  - Started allopurinol 300 mg qd as initial uric acid 9.7  - Started hydroxyurea 2000 mg BID for cytoreduction.  - Discussed with blood bank about leukapheresis. Her initial absolute blast count estimated to be 193 K/uL, well above 100 K/uL. We understand that if she actually has ALL rather than AML, then leukapheresis will not be of benefit. However, if this is AML and her blast count is confirmed by flow cytometry, then leukapheresis would be warranted. This was discussed with the patient. She expressed understanding and wanted to proceed with leukapheresis even if there was a chance that it would not be beneficial.    - Please check CBC with Diff, coags, fibrinogen Clauss, d-dimer and TLS labs (uric acid, LDH, Mg, Phos) DAILY  - Sent out flow cytometry on peripheral blood (personally confirmed tubes were given to lab and requisition form was given). Ordered STAT PML-SOULEYMANE, BCR-ABL, and FLT 3. Also ordered Onkosight Myeloid TL-95 STAT.  - Transfuse for Hgb < 7 and platelets < 10k, < 15k if febrile and < 50k if non-CNS bleeding or requiring procedures    Plan discussed with Dr. Goldberg.      Elie Marshall MD  Hematology/Oncology Fellow PGY-6  Pager: [facility] [phone] / HUAN [phone]  After 5pm and on weekends please page on-call fellow  74F hx left breast cancer, completely resected colon cancer in remission, CAD s/p CABG x2 in 2010, T2DM not on insulin, gout, HTN, HLD presented initially to [facility] with lower back pain. Transferred to [facility] for hyperleukocytosis with reported blasts concerning for new leukemia.    # Acute Leukemia  - Patient presenting with .33 and blast 69% noted on manual differential.   - Peripheral smear reviewed: RBCs are normocytic, hypochromic and have slight anisocytosis. Occasional target cells are seen. Slight polychromasia is noted. No basophilic stippling is seen. Occasional nucleated RBCs are seen. No Boss-Jolly bodies or siderotic granules are seen. WBCs appear markedly increased in number with almost entirely blast-like cells without Bran rods. Platelets appear decreased in number. Platelets appear normal morphologically with small forms.  - Based on her prior two peripheral blood flow cytometry results in 2019 and 2020, have a slightly higher suspicion for a myelomonocytic leukemia.  - Ordered serum iron w/ TIBC, ferritin, B12 and folate level, haptoglobin, retic count, fibrinogen / PT / PTT / D-dimer, LDH / Uric acid level for TLS labs, G6PD level, HIV and Acute hepatitis panel (Need to order Hep B surface Ab and Hep B core Ab total SEPARATELY).  - Please obtain TTE or MUGA scan    - If uric acid > 8, give 3 mg IV Rasburicase x1; if >12 give 6 mg IV x1. Initial uric acid 9.7, ordered rasburicase 3 mg IV STAT after confirmed G6PD level was sent. Testing for uric acid must be sent on ice for 5 days after rasburicase administration to avoid falsely low uric acid levels.   - Started allopurinol 300 mg qd as initial uric acid 9.7  - Started hydroxyurea 2000 mg BID for cytoreduction.  - Discussed with blood bank about leukapheresis. Her initial absolute blast count estimated to be 193 K/uL, well above 100 K/uL. We understand that if she actually has ALL rather than AML, then leukapheresis will not be of benefit. However, if this is AML and her blast count is confirmed by flow cytometry, then leukapheresis would be warranted. This was discussed with the patient. She expressed understanding and wanted to proceed with leukapheresis even if there was a chance that it would not be beneficial.    - Please check CBC with Diff, coags, fibrinogen Clauss, d-dimer and TLS labs (uric acid, LDH, Mg, Phos) DAILY  - Sent out flow cytometry on peripheral blood (personally confirmed tubes were given to lab and requisition form was given). Ordered STAT PML-SOULEYMANE, BCR-ABL, and FLT 3. Also ordered Onkosight Myeloid TL-95 STAT.  - Transfuse for Hgb < 7 and platelets < 10k, < 15k if febrile and < 50k if non-CNS bleeding or requiring procedures    Plan discussed with Dr. Goldberg.      Elie Marshall MD  Hematology/Oncology Fellow PGY-6  Pager: [facility] [phone] / HUAN [phone]  After 5pm and on weekends please page on-call fellow

## 2025-01-07 NOTE — ED PROVIDER NOTE - CLINICAL SUMMARY MEDICAL DECISION MAKING FREE TEXT BOX
Acute generalized weakness, back pain, abdominal pain, generalized fatigue.  Will check labs, CT, UA, IV fluids, reeval

## 2025-01-08 NOTE — PROGRESS NOTE ADULT - ASSESSMENT
75 y/o F with PMH breast cancer and colon cancer s/p resection and in remission, CAD s/p CABG x2, T2DM, gout, HTN, HLD directly admitted to MICU after presenting to [facility] w/ hyperleukocytosis w/ blast crises c/f new leukemia.    NEURO:   - currently at baseline A&O x4     PULM:  - no active issues at this time     CV:  #Elevated Troponin  - likely in setting of demand ischemia, pt w/o chest pain currently  - trop 57, EKG w/o changes  - trend trop to peak    #CAD s/p CABG (2010)   - home meds    GI:   - NPO for shiley placement  - no active issues     /RENAL:   #Hypokalemia  - replete for K>4, Phos >3, Mg >2    ID:   #Leukocytosis  - likely 2/2 to blast crisis  - will r/o infectious etiology w/ U/A, UCX, RVP, CXR, blood cx x2   - hold off on abx at this time  - trend fever curve     HEME/ONC:   #Leukostasis  #Blast crisis   #Acute leukemia   #Anemia  #Thrombocytopenia   #Breast ca  #Colon ca    - s/p left breast lumpectomy in 1999  - s/p resection in 2001   - uric acid 9.7, ddimer 17,476 .33, blasts 69% on admission; peripheral smear w/ almost entirely blast-like cells w/o Bran rods  - s/p shiley placement for emergent leukapheresis   - s/p 3mg IV rasburicase x1 in ED       - per heme if uric acid >8, give rasburicase x1; if >12 give 6mg IV x1  - c/w allopurinol 300mg qd for TLS ppx (uptitrated from home dose of 100mg QD)  - c/w hydroxyurea 2000 mg BID for cytoreduction  - per heme recs: trend cbc w/ diff, coags, fibrinogen clauss, d-dimer and TLS labs (uric acid, LDH, mg, phos) daily  - Heme onc following, recs appreciated  - maintain active type and screen  - transfuse for Hgb >7, Plt<10k, <20 w/ fever< 50k w/ bleed     #DVT Ppx:  - holding for shiley placement   75 y/o F with PMH breast cancer and colon cancer s/p resection and in remission, CAD s/p CABG x2, T2DM, gout, HTN, HLD directly admitted to MICU after presenting to [facility] w/ hyperleukocytosis w/ blast crises c/f new leukemia.    NEURO:   - currently at baseline A&O x4     PULM:  - no active issues at this time     CV:  #Elevated Troponin  - likely in setting of demand ischemia, pt w/o chest pain currently  - trop 57, EKG w/o changes  - trend trop to peak  -TTE ordered  #CAD s/p CABG (2010)   - home meds    GI:   - NPO for shiley placement  - no active issues     /RENAL:   #Hypokalemia  - replete for K>4, Phos >3, Mg >2    ID:   #Leukocytosis  - likely 2/2 to blast crisis  - will r/o infectious etiology w/ U/A, UCX, RVP, CXR, blood cx x2   - hold off on abx at this time  - trend fever curve     HEME/ONC:   #Leukostasis  #Blast crisis   #Acute leukemia   #Anemia  #Thrombocytopenia   #Breast ca  #Colon ca    - s/p left breast lumpectomy in 1999  - s/p resection in 2001   - uric acid 9.7, ddimer 17,476 .33, blasts 69% on admission; peripheral smear w/ almost entirely blast-like cells w/o Bran rods  - s/p shiley placement for emergent leukapheresis   - s/p 3mg IV rasburicase x1 in ED       - per heme if uric acid >8, give rasburicase x1; if >12 give 6mg IV x1  - c/w allopurinol 300mg qd for TLS ppx (uptitrated from home dose of 100mg QD)  - c/w hydroxyurea 2000 mg BID for cytoreduction  - per heme recs: trend cbc w/ diff, coags, fibrinogen clauss, d-dimer and TLS labs (uric acid, LDH, mg, phos) daily  - Heme onc following, recs appreciated  - maintain active type and screen  - transfuse for Hgb >7, Plt<10k, <20 w/ fever< 50k w/ bleed     #DVT Ppx:  - holding for shiley placement   73 y/o F with PMH breast cancer and colon cancer s/p resection and in remission, CAD s/p CABG x2, T2DM, gout, HTN, HLD directly admitted to MICU after presenting to [facility] w/ hyperleukocytosis w/ blast crises c/f new leukemia.    NEURO:   - currently at baseline A&O x4     PULM:  - no active issues at this time     CV:  #Elevated Troponin  - likely in setting of demand ischemia, pt w/o chest pain currently  - trop 57, EKG w/o changes  - trend trop to peak  -TTE ordered  #CAD s/p CABG (2010)   - home meds    GI:   - NPO for shiley placement  - no active issues     /RENAL:   #Hypokalemia  - replete for K>4, Phos >3, Mg >2    ID:   #Leukocytosis  - likely 2/2 to blast crisis  - will r/o infectious etiology w/ U/A, UCX, RVP, CXR, blood cx x2   - hold off on abx at this time  - trend fever curve     HEME/ONC:   #Leukostasis  #Blast crisis   #Acute leukemia   #Anemia  #Thrombocytopenia   #Breast ca  #Colon ca    - s/p left breast lumpectomy in 1999  - s/p resection in 2001   - uric acid 9.7, ddimer 17,476 .33, blasts 69% on admission; peripheral smear w/ almost entirely blast-like cells w/o Bran rods  - s/p shiley placement for emergent leukapheresis   - s/p 3mg IV rasburicase x1 in ED       - per heme if uric acid >8, give rasburicase x1; if >12 give 6mg IV x1  - c/w allopurinol 300mg qd for TLS ppx (uptitrated from home dose of 100mg QD)  - c/w hydroxyurea 2000 mg BID for cytoreduction  - per heme recs: trend cbc w/ diff, coags, fibrinogen clauss, d-dimer and TLS labs (uric acid, LDH, mg, phos) daily  - Heme onc following, recs appreciated  - maintain active type and screen  - transfuse for Hgb >7, Plt<10k, <20 w/ fever< 50k w/ bleed     #DVT Ppx:  - Scds

## 2025-01-08 NOTE — PHYSICAL THERAPY INITIAL EVALUATION ADULT - ADDITIONAL COMMENTS
Pt reports living at home with spouse at home, +2 steps + 1step to enter home, +flight of stairs in home, can stay on 1st floor; PTA ind amb and ADLs, owns cane and shower chair if needed, +reading glasses, +RH and drives, spouse can assist as needed

## 2025-01-08 NOTE — PROGRESS NOTE ADULT - ASSESSMENT
74F hx left breast cancer, completely resected colon cancer in remission, CAD s/p CABG x2 in 2010, T2DM not on insulin, gout, HTN, HLD presented initially to [facility] with lower back pain. Transferred to [facility] for hyperleukocytosis with reported blasts concerning for new leukemia.    # Acute Leukemia  - Patient presenting with .33 and blast 69% noted on manual differential.   - Peripheral smear reviewed: RBCs are normocytic, hypochromic and have slight anisocytosis. Occasional target cells are seen. Slight polychromasia is noted. No basophilic stippling is seen. Occasional nucleated RBCs are seen. No Boss-Jolly bodies or siderotic granules are seen. WBCs appear markedly increased in number with almost entirely blast-like cells without Bran rods. Platelets appear decreased in number. Platelets appear normal morphologically with small forms.  - Based on her prior two peripheral blood flow cytometry results in 2019 and 2020, have a slightly higher suspicion for a myelomonocytic leukemia.  - Ordered serum iron w/ TIBC, ferritin, B12 and folate level, haptoglobin, retic count, fibrinogen / PT / PTT / D-dimer, LDH / Uric acid level for TLS labs, G6PD level, HIV and Acute hepatitis panel (Need to order Hep B surface Ab and Hep B core Ab total SEPARATELY).  - Please obtain TTE or MUGA scan    - If uric acid > 8, give 3 mg IV Rasburicase x1; if >12 give 6 mg IV x1. Initial uric acid 9.7, ordered rasburicase 3 mg IV STAT after confirmed G6PD level was sent. Testing for uric acid must be sent on ice for 5 days after rasburicase administration to avoid falsely low uric acid levels.   - Started allopurinol 300 mg qd as initial uric acid 9.7  - Started hydroxyurea 2000 mg BID for cytoreduction.  - Discussed with blood bank about leukapheresis. Her initial absolute blast count estimated to be 193 K/uL, well above 100 K/uL. We understand that if she actually has ALL rather than AML, then leukapheresis will not be of benefit. However, if this is AML and her blast count is confirmed by flow cytometry, then leukapheresis would be warranted. This was discussed with the patient. She expressed understanding and wanted to proceed with leukapheresis even if there was a chance that it would not be beneficial.    - Please check CBC with Diff, coags, fibrinogen Clauss, d-dimer and TLS labs (uric acid, LDH, Mg, Phos) DAILY  - Sent out flow cytometry on peripheral blood (personally confirmed tubes were given to lab and requisition form was given). Ordered STAT PML-SOULEYMANE, BCR-ABL, and FLT 3. Also ordered Onkosight Myeloid TL-95 STAT.  - Transfuse for Hgb < 7 and platelets < 10k, < 15k if febrile and < 50k if non-CNS bleeding or requiring procedures      **INCOMPLETE NOTE**    Note not finalized until signed by attending.   Please do not hesitate to page with questions.     Darlyn Bailey MD  Hematology/Oncology Fellow PGY5  Available on [employer] Teams   Pager: [phone]  For weekends and evenings (5 pm - 8 am), please page fellow on call.  74F hx left breast cancer, completely resected colon cancer in remission, CAD s/p CABG x2 in 2010, T2DM not on insulin, gout, HTN, HLD presented initially to [facility] with lower back pain. Transferred to [facility] for hyperleukocytosis with reported blasts concerning for new leukemia.    #Acute Leukemia  - Patient presenting with .33 and blast 69% noted on manual differential.   - Peripheral smear reviewed: RBCs are normocytic, hypochromic and have slight anisocytosis. Occasional target cells are seen. Slight polychromasia is noted. No basophilic stippling is seen. Occasional nucleated RBCs are seen. No Boss-Jolly bodies or siderotic granules are seen. WBCs appear markedly increased in number with almost entirely blast-like cells without Bran rods. Platelets appear decreased in number. Platelets appear normal morphologically with small forms.  - Based on her prior two peripheral blood flow cytometry results in 2019 and 2020, have a slightly higher suspicion for a myelomonocytic leukemia.  - Iron panel c/w ACD. pending G6PD HIV and hepatitis panel.     Recommendations:   - Please obtain TTE or MUGA scan  - If uric acid > 8, give 3 mg IV Rasburicase x1; if >12 give 6 mg IV x1. Initial uric acid 9.7, ordered rasburicase 3 mg IV STAT after confirmed G6PD level was sent. Testing for uric acid must be sent on ice for 5 days after rasburicase administration to avoid falsely low uric acid levels.   - Continue allopurinol 300 mg qd as initial uric acid 9.7  - Continue with hydroxyurea 2000 mg BID for cytoreduction.  - Appreciate transfusion medicine eval 1/7/2024: s/p 1 session of leukopheresis; tolerated well WBC 287k ->160k today  - Plan for repeat leukapheresis today, discussed with primary team and blood bank.   - Will perform bedside bone marrow biopsy today.   - Please check CBC with Diff, coags, fibrinogen Clauss, d-dimer and TLS labs (uric acid, LDH, Mg, Phos) DAILY  - Transfuse for Hgb < 7 and platelets < 10k, < 15k if febrile and < 50k if non-CNS bleeding or requiring procedures  - Plan to transfer to [address] [place] [address] when bed available and post leukapheresis.    **INCOMPLETE NOTE**    Note not finalized until signed by attending.   Please do not hesitate to page with questions.     Darlyn Bailey MD  Hematology/Oncology Fellow PGY5  Available on Microsoft Teams   Pager: [phone]  For weekends and evenings (5 pm - 8 am), please page fellow on call.  74F hx presented initially to [facility] with lower back pain and transferred to [facility] for hyperleukocytosis with reported blasts concerning for new leukemia.    PMHx: left breast cancer s/p lumpectomy, RT and chemo, colon cancer s/p resection, CAD s/p CABG x2 in 2010, T2DM not on insulin, gout, HTN, HLD     #Acute Leukemia  - Patient presenting with .33 and blast 69% noted on manual differential.   - Peripheral smear reviewed: RBCs are normocytic, hypochromic and have slight anisocytosis. Occasional target cells are seen. Slight polychromasia is noted. No basophilic stippling is seen. Occasional nucleated RBCs are seen. No Boss-Jolly bodies or siderotic granules are seen. WBCs appear markedly increased in number with almost entirely blast-like cells without Bran rods. Platelets appear decreased in number. Platelets appear normal morphologically with small forms.  - Based on her prior two peripheral blood flow cytometry results in 2019 and 2020, have a slightly higher suspicion for a myelomonocytic leukemia.  - Iron panel c/w ACD. pending G6PD HIV and hepatitis panel.     Recommendations:   - Please obtain TTE or MUGA scan  - Continue allopurinol 300 mg qd as initial uric acid 9.7  - Continue with hydroxyurea 2000 mg BID for cytoreduction.  - Appreciate transfusion medicine eval 1/7/2024: s/p 1 session of leukopheresis; tolerated well WBC 287k ->160k today  - Plan for repeat leukapheresis today, discussed with primary team and blood bank.   - Will perform bedside bone marrow biopsy today.   - Please check CBC with Diff, coags, fibrinogen Clauss, d-dimer and TLS labs (uric acid, LDH, Mg, Phos) DAILY  - Please give cryo for fibrinogen <150   - If uric acid > 8, give 3 mg IV Rasburicase x1; if >12 give 6 mg IV x1. Initial uric acid 9.7, ordered rasburicase 3 mg IV STAT after confirmed G6PD level was sent. Testing for uric acid must be sent on ice for 5 days after rasburicase administration to avoid falsely low uric acid levels.   - Transfuse for Hgb < 7 and platelets < 10k, < 15k if febrile and < 50k if non-CNS bleeding or requiring procedures  - Plan to transfer to [address] [place] [address] when bed available and post leukapheresis.    **INCOMPLETE NOTE**    Note not finalized until signed by attending.   Please do not hesitate to page with questions.     Darlyn Bailey MD  Hematology/Oncology Fellow PGY5  Available on Microsoft Teams   Pager: [phone]  For weekends and evenings (5 pm - 8 am), please page fellow on call.  74F hx presented initially to [facility] with lower back pain and transferred to [facility] for hyperleukocytosis with reported blasts concerning for new leukemia.    PMHx: left breast cancer s/p lumpectomy, RT and chemo, colon cancer s/p resection, CAD s/p CABG x2 in 2010, T2DM not on insulin, gout, HTN, HLD     #Acute Leukemia  - Patient presenting with .33 and blast 69% noted on manual differential.   - Peripheral smear reviewed: RBCs are normocytic, hypochromic and have slight anisocytosis. Occasional target cells are seen. Slight polychromasia is noted. No basophilic stippling is seen. Occasional nucleated RBCs are seen. No Boss-Jolly bodies or siderotic granules are seen. WBCs appear markedly increased in number with almost entirely blast-like cells without Bran rods. Platelets appear decreased in number. Platelets appear normal morphologically with small forms.  - Based on her prior two peripheral blood flow cytometry results in 2019 and 2020, have a slightly higher suspicion for a myelomonocytic leukemia.  - Iron panel c/w ACD. pending G6PD HIV and hepatitis panel.     Recommendations:   - Please obtain TTE or MUGA scan  - Continue allopurinol 300 mg qd as initial uric acid 9.7  - Continue with hydroxyurea 2000 mg BID for cytoreduction.  - Appreciate transfusion medicine eval 1/7/2024: s/p 1 session of leukopheresis; tolerated well WBC 287k ->160k today  - Plan for repeat leukapheresis tonight, discussed with primary team and blood bank.   - Will perform bedside bone marrow biopsy/aspirate today.   - Please check CBC with Diff, coags, fibrinogen Clauss, d-dimer and TLS labs (uric acid, LDH, Mg, Phos) DAILY  - Please give cryo for fibrinogen <150   - If uric acid > 8, give 3 mg IV Rasburicase x1; if >12 give 6 mg IV x1. Initial uric acid 9.7, ordered rasburicase 3 mg IV STAT after confirmed G6PD level was sent. Testing for uric acid must be sent on ice for 5 days after rasburicase administration to avoid falsely low uric acid levels.   - Transfuse for Hgb < 7 and platelets < 10k, < 15k if febrile and < 50k if non-CNS bleeding or requiring procedures  - Plan to transfer to [address] [place] [address] when bed available and post leukapheresis.    Note not finalized until signed by attending.   Please do not hesitate to page with questions.     Darlyn Bailey MD  Hematology/Oncology Fellow PGY5  Available on [employer] Teams   Pager: [phone]  For weekends and evenings (5 pm - 8 am), please page fellow on call.

## 2025-01-08 NOTE — PROGRESS NOTE ADULT - SUBJECTIVE AND OBJECTIVE BOX
Patient is a 74y old  Female who presents with a chief complaint of Hyperleukocytosis/Acute Leukemia (07 Jan 2025 23:36)      24 hour events: ***    75 y/o F with PMHx breast cancer s/p lumpectomy, RT and chemo in 1999 w/ local recurrence in 2023 s/p resection; colon ca s/p resection in 2021, CAD s/p CABG x2 in 2010, HTN, HLD and T2DM who initially presented to [facility] for lethargy x 1 month w/ exertional SOB, acute on chronic low back pain and new b/l leg pain. She was found to have  w/ 69% blasts, transferred to [facility] for hematology evaluation of new acute leukemia and blast crisis. Pt seen and evaluated by heme/onc in the ED, given allopurinol 300mg x1, rasburicase 3mg IV x1, hydroxyurea 200mg PO x1, 10 mEq K repletion. Pt was accepted directly to MICU for emergent leukapheresis.     Brief ED course:  VS: T 98.2, HR 86, /79, RR 16 spO2 94% on RA.  Labs: .33 1/ 69% blasts, Hgb 9.5, plt 43. K 2.6, trop 143.6, pro-BNP 1015  Imaging:CT lumbar spine showing L5-S1 diffuse disc bulging contributes to high-grade bilateral foraminal compromise without significant central canal compromise      OBJECTIVE:  ICU Vital Signs Last 24 Hrs  T(C): 36.4 (08 Jan 2025 04:00), Max: 37.7 (07 Jan 2025 18:40)  T(F): 97.6 (08 Jan 2025 04:00), Max: 99.9 (07 Jan 2025 19:32)  HR: 84 (08 Jan 2025 06:00) (79 - 96)  BP: 161/70 (08 Jan 2025 06:00) (141/64 - 206/82)  BP(mean): 101 (08 Jan 2025 06:00) (92 - 122)  ABP: --  ABP(mean): --  RR: 20 (08 Jan 2025 06:00) (16 - 33)  SpO2: 95% (08 Jan 2025 06:00) (94% - 98%)    O2 Parameters below as of 07 Jan 2025 22:30  Patient On (Oxygen Delivery Method): room air          01-07 @ 07:01  -  01-08 @ 07:00  --------------------------------------------------------  IN: 150 mL / OUT: 0 mL / NET: 150 mL      CAPILLARY BLOOD GLUCOSE            PHYSICAL EXAM:  CONSTITUTIONAL: well-nourished, well-groomed laying comfortably in bed in NAD  EYES: mild arcus senilis b/o, PERRLA and symmetric, EOMI, No conjunctival or scleral injection, non-icteric  ENMT: large nodular torus palatinus  NECK: R internal jugular catheter, soft, supple, trachea midline  RESP: No respiratory distress, no use of accessory muscles; CTA b/l in anterior lung fields no WRR  CV: tachycardic, normal S1/S2, normal rhythm  GI: obese, soft, nondistended, +TTP in LLQ, no rebound tenderness  MSK: Gait not assessed; strength 5/5 in b/l lower extremities  SKIN: No rashes or ulcers noted  NEURO: AOx4, nonfocal  PSYCH: mood and affect appropriate to situation    HOSPITAL MEDICATIONS:  MEDICATIONS  (STANDING):  allopurinol 300 milliGRAM(s) Oral daily  chlorhexidine 2% Cloths 1 Application(s) Topical <User Schedule>  chlorhexidine 4% Liquid 1 Application(s) Topical <User Schedule>  hydroxyurea 2000 milliGRAM(s) Oral two times a day    MEDICATIONS  (PRN):  sodium chloride 0.9% lock flush 10 milliLiter(s) IV Push every 1 hour PRN Pre/post blood products, medications, blood draw, and to maintain line patency      LABS:                        8.9    160.76 )-----------( 21       ( 08 Jan 2025 05:51 )             27.8       01-08    140  |  101  |  16  ----------------------------<  194[H]  3.0[L]   |  26  |  1.10    Ca    9.6      08 Jan 2025 05:51  Phos  2.7     01-08  Mg     1.4     01-08    TPro  6.7  /  Alb  3.7  /  TBili  0.7  /  DBili  x   /  AST  24  /  ALT  14  /  AlkPhos  67  01-08    Lactate 0.9           01-07 @ 13:46          PT/INR - ( 08 Jan 2025 05:51 )   PT: 14.5 sec;   INR: 1.26 ratio         PTT - ( 08 Jan 2025 05:51 )  PTT:23.4 sec  Urinalysis Basic - ( 08 Jan 2025 05:51 )    Color: x / Appearance: x / SG: x / pH: x  Gluc: 194 mg/dL / Ketone: x  / Bili: x / Urobili: x   Blood: x / Protein: x / Nitrite: x   Leuk Esterase: x / RBC: x / WBC x   Sq Epi: x / Non Sq Epi: x / Bacteria: x              MICROBIOLOGY:     Radiology: ***    Bedside lung ultrasound: ***    Bedside ECHO: ***    EKG:    CENTRAL LINE: Y/N          DATE INSERTED:              REMOVE: Y/N    WHITE: Y/N                        DATE INSERTED:              REMOVE: Y/N    A-LINE: Y/N                       DATE INSERTED:              REMOVE: Y/N    GLOBAL ISSUE/BEST PRACTICE:  Analgesia:  Sedation:  HOB elevation: yes  Stress ulcer prophylaxis:  VTE prophylaxis:  Glycemic control:  Nutrition:    CODE STATUS: ***  [facility] discussion: Y

## 2025-01-08 NOTE — PHYSICAL THERAPY INITIAL EVALUATION ADULT - GENERAL OBSERVATIONS, REHAB EVAL
Pt with PMH breast cancer and colon cancer s/p resection and in remission, CAD s/p CABG x2, T2DM, gout, HTN, HLD directly admitted to MICU for emergent leukapheresis after presenting to [facility] w/ hyperleukocytosis w/ blast crises c/f new leukemia; s/p shiley placement overnight and received transfusion protocol overnight. Pt received sitting in chair at bedside, +ICU monitoring, +elevated BP (Cuff RLE; moved to RUE with improved reading, RN Izzy and ACP Soley aware), +RIJ shiley (red tape), +IVL. Pt is awake, A&OX4, follows commands

## 2025-01-08 NOTE — PROGRESS NOTE ADULT - SUBJECTIVE AND OBJECTIVE BOX
Hematology Follow-up    INTERVAL HPI/OVERNIGHT EVENTS:  Patient S&E at bedside. No o/n events, patient resting comfortably. No complaints at this time. Patient denies fever, chills, dizziness, weakness, CP, palpitations, SOB, cough, N/V/D/C, dysuria, changes in bowel movements, LE edema.    VITAL SIGNS:  T(F): 97.7 (01-08-25 @ 08:00)  HR: 87 (01-08-25 @ 09:45)  BP: 198/91 (01-08-25 @ 09:45)  RR: 26 (01-08-25 @ 09:45)  SpO2: 94% (01-08-25 @ 09:45)  Wt(kg): --    PHYSICAL EXAM:    Constitutional: AAOx3, NAD,   Eyes: PERRL, EOMI, sclera non-icteric  Neck: supple, no masses, no JVD  Respiratory: CTA b/l, good air entry b/l, no wheezing, rhonchi, rales, with normal respiratory effort and no intercostal retractions  Cardiovascular: RRR, normal S1S2, no M/R/G  Gastrointestinal: soft, NTND, no masses palpable, BS normal in all four quadrants, no HSM  Extremities:  no c/c/e  Neurological: Grossly intact  Skin: Normal temperature    MEDICATIONS  (STANDING):  allopurinol 300 milliGRAM(s) Oral daily  atorvastatin 80 milliGRAM(s) Oral at bedtime  chlorhexidine 2% Cloths 1 Application(s) Topical <User Schedule>  chlorhexidine 4% Liquid 1 Application(s) Topical <User Schedule>  hydroxyurea 2000 milliGRAM(s) Oral two times a day  metoprolol tartrate 50 milliGRAM(s) Oral two times a day  potassium chloride    Tablet ER 40 milliEquivalent(s) Oral every 2 hours  rasburicase IVPB 3 milliGRAM(s) IV Intermittent once    MEDICATIONS  (PRN):  sodium chloride 0.9% lock flush 10 milliLiter(s) IV Push every 1 hour PRN Pre/post blood products, medications, blood draw, and to maintain line patency      atenolol (Unknown)  penicillin (Hives)  sulfa drugs (Hives)  shellfish (Hives)      LABS:                        8.9    160.76 )-----------( 21 ( 08 Jan 2025 05:51 )             27.8     01-08    140  |  101  |  16  ----------------------------<  194[H]  3.0[L]   |  26  |  1.10    Ca    9.6      08 Jan 2025 05:51  Phos  2.7     01-08  Mg     1.4     01-08    TPro  6.7  /  Alb  3.7  /  TBili  0.7  /  DBili  x   /  AST  24  /  ALT  14  /  AlkPhos  67  01-08    PT/INR - ( 08 Jan 2025 05:51 )   PT: 14.5 sec;   INR: 1.26 ratio         PTT - ( 08 Jan 2025 05:51 )  PTT:23.4 sec Lactate Dehydrogenase, Serum: 574 U/L (01-07 @ 23:51)  Haptoglobin, Serum: 161 mg/dL (01-07 @ 19:01)  Lactate Dehydrogenase, Serum: 528 U/L (01-07 @ 18:51)    Urinalysis Basic - ( 08 Jan 2025 05:51 )    Color: x / Appearance: x / SG: x / pH: x  Gluc: 194 mg/dL / Ketone: x  / Bili: x / Urobili: x   Blood: x / Protein: x / Nitrite: x   Leuk Esterase: x / RBC: x / WBC x   Sq Epi: x / Non Sq Epi: x / Bacteria: x        RADIOLOGY & ADDITIONAL TESTS:  Studies reviewed.   Hematology Follow-up    INTERVAL HPI/OVERNIGHT EVENTS:  Patient seen and evaluated at bedside, was tearful and overwhelmed with everything going on. S/p first session of leukopheresis today, tolerated well.     VITAL SIGNS:  T(F): 97.7 (01-08-25 @ 08:00)  HR: 87 (01-08-25 @ 09:45)  BP: 198/91 (01-08-25 @ 09:45)  RR: 26 (01-08-25 @ 09:45)  SpO2: 94% (01-08-25 @ 09:45)  Wt(kg): --    PHYSICAL EXAM:  GENERAL: NAD  HEAD:  Atraumatic, Normocephalic  EYES: EOMI, conjunctiva and sclera clear  CHEST/LUNG: Clear to auscultation bilaterally  HEART: Regular rate, irregularly irregular rhythm; III/VI crescendo-decrescendo murmur best auscultated at right second intercostal space  ABDOMEN: Soft, Nontender, Nondistended  EXTREMITIES:  2+ Peripheral Pulses. Trace pitting edema b/l LE. No clubbing or cyanosis.  NEUROLOGY: non-focal  SKIN: No rashes or lesions    MEDICATIONS  (STANDING):  allopurinol 300 milliGRAM(s) Oral daily  atorvastatin 80 milliGRAM(s) Oral at bedtime  chlorhexidine 2% Cloths 1 Application(s) Topical <User Schedule>  chlorhexidine 4% Liquid 1 Application(s) Topical <User Schedule>  hydroxyurea 2000 milliGRAM(s) Oral two times a day  metoprolol tartrate 50 milliGRAM(s) Oral two times a day  potassium chloride    Tablet ER 40 milliEquivalent(s) Oral every 2 hours  rasburicase IVPB 3 milliGRAM(s) IV Intermittent once    MEDICATIONS  (PRN):  sodium chloride 0.9% lock flush 10 milliLiter(s) IV Push every 1 hour PRN Pre/post blood products, medications, blood draw, and to maintain line patency      atenolol (Unknown)  penicillin (Hives)  sulfa drugs (Hives)  shellfish (Hives)      LABS:                        8.9    160.76 )-----------( 21       ( 08 Jan 2025 05:51 )             27.8     01-08    140  |  101  |  16  ----------------------------<  194[H]  3.0[L]   |  26  |  1.10    Ca    9.6      08 Jan 2025 05:51  Phos  2.7     01-08  Mg     1.4     01-08    TPro  6.7  /  Alb  3.7  /  TBili  0.7  /  DBili  x   /  AST  24  /  ALT  14  /  AlkPhos  67  01-08    PT/INR - ( 08 Jan 2025 05:51 )   PT: 14.5 sec;   INR: 1.26 ratio         PTT - ( 08 Jan 2025 05:51 )  PTT:23.4 sec Lactate Dehydrogenase, Serum: 574 U/L (01-07 @ 23:51)  Haptoglobin, Serum: 161 mg/dL (01-07 @ 19:01)  Lactate Dehydrogenase, Serum: 528 U/L (01-07 @ 18:51)    Urinalysis Basic - ( 08 Jan 2025 05:51 )    Color: x / Appearance: x / SG: x / pH: x  Gluc: 194 mg/dL / Ketone: x  / Bili: x / Urobili: x   Blood: x / Protein: x / Nitrite: x   Leuk Esterase: x / RBC: x / WBC x   Sq Epi: x / Non Sq Epi: x / Bacteria: x        RADIOLOGY & ADDITIONAL TESTS:  Studies reviewed.   Hematology Follow-up    INTERVAL HPI/OVERNIGHT EVENTS:  Patient seen and evaluated at bedside, was tearful and overwhelmed with everything going on. S/p first session of leukopheresis today, tolerated well.     VITAL SIGNS:  T(F): 97.7 (01-08-25 @ 08:00)  HR: 87 (01-08-25 @ 09:45)  BP: 198/91 (01-08-25 @ 09:45)  RR: 26 (01-08-25 @ 09:45)  SpO2: 94% (01-08-25 @ 09:45)  Wt(kg): --    PHYSICAL EXAM:  GENERAL: NAD  HEAD:  Atraumatic, Normocephalic  EYES: EOMI, conjunctiva and sclera clear  CHEST/LUNG: Clear to auscultation bilaterally  HEART: Regular rate, irregularly irregular rhythm  ABDOMEN: Soft, Nontender, Nondistended  EXTREMITIES:  2+ Peripheral Pulses. Trace pitting edema b/l LE. No clubbing or cyanosis.  NEUROLOGY: non-focal  SKIN: No rashes or lesions    MEDICATIONS  (STANDING):  allopurinol 300 milliGRAM(s) Oral daily  atorvastatin 80 milliGRAM(s) Oral at bedtime  chlorhexidine 2% Cloths 1 Application(s) Topical <User Schedule>  chlorhexidine 4% Liquid 1 Application(s) Topical <User Schedule>  hydroxyurea 2000 milliGRAM(s) Oral two times a day  metoprolol tartrate 50 milliGRAM(s) Oral two times a day  potassium chloride    Tablet ER 40 milliEquivalent(s) Oral every 2 hours  rasburicase IVPB 3 milliGRAM(s) IV Intermittent once    MEDICATIONS  (PRN):  sodium chloride 0.9% lock flush 10 milliLiter(s) IV Push every 1 hour PRN Pre/post blood products, medications, blood draw, and to maintain line patency      atenolol (Unknown)  penicillin (Hives)  sulfa drugs (Hives)  shellfish (Hives)      LABS:                        8.9    160.76 )-----------( 21       ( 08 Jan 2025 05:51 )             27.8     01-08    140  |  101  |  16  ----------------------------<  194[H]  3.0[L]   |  26  |  1.10    Ca    9.6      08 Jan 2025 05:51  Phos  2.7     01-08  Mg     1.4     01-08    TPro  6.7  /  Alb  3.7  /  TBili  0.7  /  DBili  x   /  AST  24  /  ALT  14  /  AlkPhos  67  01-08    PT/INR - ( 08 Jan 2025 05:51 )   PT: 14.5 sec;   INR: 1.26 ratio         PTT - ( 08 Jan 2025 05:51 )  PTT:23.4 sec Lactate Dehydrogenase, Serum: 574 U/L (01-07 @ 23:51)  Haptoglobin, Serum: 161 mg/dL (01-07 @ 19:01)  Lactate Dehydrogenase, Serum: 528 U/L (01-07 @ 18:51)    Urinalysis Basic - ( 08 Jan 2025 05:51 )    Color: x / Appearance: x / SG: x / pH: x  Gluc: 194 mg/dL / Ketone: x  / Bili: x / Urobili: x   Blood: x / Protein: x / Nitrite: x   Leuk Esterase: x / RBC: x / WBC x   Sq Epi: x / Non Sq Epi: x / Bacteria: x        RADIOLOGY & ADDITIONAL TESTS:  Studies reviewed.

## 2025-01-08 NOTE — CONSULT NOTE ADULT - ASSESSMENT
5 y/o F PMH breast cancer in remission('99, '23), colon cancer '01 s/p resection in remission, HTN, HLD, DM2, CAD status post CABG @ [facility] &, vertigo.    Presents to [place] [facility] 1/7 c/o generalized back pain x 3 months with some abdominal discomfort over the past 1 week. She has been feeling some profound weakness and fatigue and has been unable to carry on ADLs.  Over the past 3 days or so she has had some dyspnea on exertion. Patient is having some left upper leg discomfort.      - mild troponin elevation, though no suggestion of acs (Trop I 143--> Trop T 57)  - ekg with sr, rbbb, nsst abn  - without cp or dyspnea during my exam  - found to have significantly elevated WBC at >280k, now trending   - troponin elevation likely in the setting of possible new leukemia   - Heme onc following. RIJ catheter placed s/p therapeutic leukapheresis   - bnp elevated, though no does not appear volume overloaded  - replete K  - Needs BP control, would place on Amlodipine to start  - will follow with you.    #CAD s/p CABG:  - ASA and statin when able   3 y/o F PMH breast cancer in remission('99, '23), colon cancer '01 s/p resection in remission, HTN, HLD, DM2, CAD status post CABG @ [facility] &, vertigo.    Presents to [place] [facility] 1/7 c/o generalized back pain x 3 months with some abdominal discomfort over the past 1 week. She has been feeling some profound weakness and fatigue and has been unable to carry on ADLs.  Over the past 3 days or so she has had some dyspnea on exertion. Patient is having some left upper leg discomfort.      - mild troponin elevation, though no suggestion of acs (Trop I 143--> Trop T 57)  - ekg with sr, rbbb, nsst abn  - Obtain TTE  - without cp or dyspnea during my exam  - found to have significantly elevated WBC at >280k, now trending   - troponin elevation likely in the setting of possible new leukemia   - Heme onc following. RIJ catheter placed s/p therapeutic leukapheresis   - bnp elevated, though no does not appear volume overloaded  - replete K  - Needs BP control, would place on Amlodipine to start  - will follow with you.    #CAD s/p CABG:  - ASA and statin when able

## 2025-01-08 NOTE — PHYSICAL THERAPY INITIAL EVALUATION ADULT - PERTINENT HX OF CURRENT PROBLEM, REHAB EVAL
Pt is a 75 y/o F admitted to [facility] on 1/7/25 with PMHx breast cancer s/p lumpectomy, RT and chemo in 1999 w/ local recurrence in 2023 s/p resection; colon ca s/p resection in 2021, CAD s/p CABG x2 in 2010, HTN, HLD and T2DM who initially presented to [place] for lethargy x 1 month w/ exertional SOB, acute on chronic low back pain and new b/l leg pain. She was found to have  w/ 69% blasts, transferred to [facility] for hematology evaluation of new acute leukemia and blast crisis. Hospital course: Brief ED course: VS: T 98.2, HR 86, /79, RR 16 spO2 94% on RA. Labs: .33 1/ 69% blasts, Hgb 9.5, plt 43. K 2.6, trop 143.6, pro-BNP 1015. Imaging:CT lumbar spine showing L5-S1 diffuse disc bulging contributes to high-grade bilateral foraminal compromise without significant central canal compromise. Pt seen and evaluated by heme/onc in the ED, given allopurinol 300mg x1, rasburicase 3mg IV x1, hydroxyurea 200mg PO x1, 10 mEq K repletion. Pt was accepted directly to MICU for emergent leukapheresis. CT A/P: No evidence of acute abnormality in the pelvis. CT Lspine: No vertebral fracture is recognized. Grade 1 anterior listhesis of L4 on L5 contributes with additional degenerative features to only low-grade foraminal compromise without significant central canal compromise. L5-S1 diffuse disc bulging contributes to high-grade bilateral foraminal compromise without significant central canal compromise. Nerve roots the cauda equina appear mildly prominent for the CT technique. MR evaluation may be considered. See separate body CT report for additional findings. CXR: clear lungs. LLE duplex: No evidence of left lower extremity deep venous thrombosis. Elevated troponin: likely in setting of demand ischemia, pt w/o chest pain currently, trop 57, EKG w/o changes. 1/8: f/u blood bank about leukapharesis. if no plan-> pull line bedboard

## 2025-01-08 NOTE — PROGRESS NOTE ADULT - NS ATTEND AMEND GEN_ALL_CORE FT
75 y/o F w/hx as above including prior breast cancer and colon cancer, and CAD s/p CABG admitted for blast crisis in setting of presumed leukemia.     - Leukopheresis and chemo as per oncology  - Monitor chemistry for tumor lysis   - Rasburicase, allopurinol, hydroxyurea  - Downgrade to oncology service  - DVT prophylaxis

## 2025-01-08 NOTE — CHART NOTE - NSCHARTNOTEFT_GEN_A_CORE
MICU Transfer Note    Transfer from: MICU    Transfer to: (  ) Medicine    (  ) Telemetry     (   ) RCU        (    ) Palliative         (   ) Stroke Unit          (   ) __________________    Accepting physican:    75 y/o F with PMH breast cancer and colon cancer s/p resection and in remission, CAD s/p CABG x2, T2DM, gout, HTN, HLD directly admitted to MICU after presenting to [facility] w/ hyperleukocytosis w/ blast crises c/f new leukemia.      #Leukocytosis  - likely 2/2 to blast crisis  -Pt had 1st Leukopheresis session overnight, WBC down to 160 from 280  - will r/o infectious etiology w/ U/A, UCX, RVP, CXR, blood cx x2   - hold off on abx at this time    HEME/ONC:   #Leukostasis  #Blast crisis   #Acute leukemia   #Anemia  #Thrombocytopenia   #Breast ca  #Colon ca    - s/p left breast lumpectomy in 1999  - s/p resection in 2001   - uric acid 9.7, ddimer 17,476 .33, blasts 69% on admission; peripheral smear w/ almost entirely blast-like cells w/o Bran rods  - s/p shiley placement for emergent leukapheresis   - s/p 3mg IV rasburicase x1 in ED  - per heme if uric acid >8, give rasburicase 3mg x1; if >12 give 6mg IV x1  - c/w allopurinol 300mg qd for TLS ppx (uptitrated from home dose of 100mg QD)  - c/w hydroxyurea 2000 mg BID for cytoreduction  - per heme recs: trend cbc w/ diff, coags, fibrinogen clauss, d-dimer and TLS labs (uric acid, LDH, mg, phos) daily  - Heme onc following  - maintain active type and screen  - transfuse for Hgb >7, Plt<10k, <20 w/ fever< 50k w/ bleed       For Followup:  - Heme/ Onc recs []      Vital Signs Last 24 Hrs  T(C): 36.5 (08 Jan 2025 08:00), Max: 37.7 (07 Jan 2025 18:40)  T(F): 97.7 (08 Jan 2025 08:00), Max: 99.9 (07 Jan 2025 19:32)  HR: 87 (08 Jan 2025 10:00) (77 - 98)  BP: 140/63 (08 Jan 2025 10:00) (140/63 - 207/91)  BP(mean): 90 (08 Jan 2025 10:00) (90 - 131)  RR: 13 (08 Jan 2025 10:00) (13 - 33)  SpO2: 93% (08 Jan 2025 10:00) (93% - 98%)    Parameters below as of 08 Jan 2025 09:59  Patient On (Oxygen Delivery Method): room air      I&O's Summary    07 Jan 2025 07:01  -  08 Jan 2025 07:00  --------------------------------------------------------  IN: 150 mL / OUT: 0 mL / NET: 150 mL    08 Jan 2025 07:01  -  08 Jan 2025 11:51  --------------------------------------------------------  IN: 530 mL / OUT: 0 mL / NET: 530 mL        MEDICATIONS  (STANDING):  allopurinol 300 milliGRAM(s) Oral daily  atorvastatin 80 milliGRAM(s) Oral at bedtime  chlorhexidine 2% Cloths 1 Application(s) Topical <User Schedule>  chlorhexidine 4% Liquid 1 Application(s) Topical <User Schedule>  hydroxyurea 2000 milliGRAM(s) Oral two times a day  metoprolol tartrate 50 milliGRAM(s) Oral two times a day  rasburicase IVPB 3 milliGRAM(s) IV Intermittent once    MEDICATIONS  (PRN):  sodium chloride 0.9% lock flush 10 milliLiter(s) IV Push every 1 hour PRN Pre/post blood products, medications, blood draw, and to maintain line patency        LABS                                            8.9                   Neurophils% (auto):   3.4    (01-08 @ 05:51):    160.76)-----------(21           Lymphocytes% (auto):  25.9                                          27.8                   Eosinphils% (auto):   0.0      Manual%: Neutrophils x    ; Lymphocytes x    ; Eosinophils x    ; Bands%: x    ; Blasts x                                    140    |  101    |  16                  Calcium: 9.6   / iCa: x      (01-08 @ 05:51)    ----------------------------<  194       Magnesium: 1.4                              3.0     |  26     |  1.10             Phosphorous: 2.7      TPro  6.7    /  Alb  3.7    /  TBili  0.7    /  DBili  x      /  AST  24     /  ALT  14     /  AlkPhos  67     08 Jan 2025 05:51    ( 01-08 @ 05:51 )   PT: 14.5 sec;   INR: 1.26 ratio  aPTT: 23.4 sec MICU Transfer Note    Transfer from: MICU    Transfer to: (  ) Medicine    (  ) Telemetry     (   ) RCU        (    ) Palliative         (   ) Stroke Unit          ( x  ) 7 [facility]    Accepting physican: Dr. Goldberg    73 y/o F with PMH breast cancer and colon cancer s/p resection and in remission, CAD s/p CABG x2, T2DM, gout, HTN, HLD directly admitted to MICU after presenting to [facility] w/ hyperleukocytosis w/ blast crises c/f new leukemia.      #Leukocytosis  - likely 2/2 to blast crisis  -Pt had s/p Leukopheresis 2 session WBC down to 80s from 280  - will r/o infectious etiology w/ U/A, UCX, RVP, CXR, blood cx x2   - hold off on abx at this time    HEME/ONC:   #Leukostasis  #Blast crisis   #Acute leukemia   #Anemia  #Thrombocytopenia   #Breast ca  #Colon ca    - s/p left breast lumpectomy in 1999  - s/p resection in 2001   - uric acid 9.7, ddimer 17,476 .33, blasts 69% on admission; peripheral smear w/ almost entirely blast-like cells w/o Bran rods  - s/p shiley placement for emergent leukapheresis   - s/p 3mg IV rasburicase x1 in ED and 3mg x 1 yesterday (1/8/25)  - per heme if uric acid >8, give rasburicase 3mg x1; if >12 give 6mg IV x1  - c/w allopurinol 300mg qd for TLS ppx (uptitrated from home dose of 100mg QD)  - c/w hydroxyurea 2000 mg BID for cytoreduction  - per heme recs: trend cbc w/ diff, coags, fibrinogen clauss, d-dimer and TLS labs (uric acid, LDH, mg, phos) daily  - Heme onc following  - maintain active type and screen  - transfuse for Hgb >7, Plt<10k, <20 w/ fever< 50k w/ bleed   -Pt received a unit of plt today (1/9/25) for plts of 13 and a unit of Cryo for fibrinogen 145      For Followup:  - Heme/ Onc recs []      Vital Signs Last 24 Hrs  T(C): 36.5 (08 Jan 2025 08:00), Max: 37.7 (07 Jan 2025 18:40)  T(F): 97.7 (08 Jan 2025 08:00), Max: 99.9 (07 Jan 2025 19:32)  HR: 87 (08 Jan 2025 10:00) (77 - 98)  BP: 140/63 (08 Jan 2025 10:00) (140/63 - 207/91)  BP(mean): 90 (08 Jan 2025 10:00) (90 - 131)  RR: 13 (08 Jan 2025 10:00) (13 - 33)  SpO2: 93% (08 Jan 2025 10:00) (93% - 98%)    Parameters below as of 08 Jan 2025 09:59  Patient On (Oxygen Delivery Method): room air      I&O's Summary    07 Jan 2025 07:01  -  08 Jan 2025 07:00  --------------------------------------------------------  IN: 150 mL / OUT: 0 mL / NET: 150 mL    08 Jan 2025 07:01  -  08 Jan 2025 11:51  --------------------------------------------------------  IN: 530 mL / OUT: 0 mL / NET: 530 mL        MEDICATIONS  (STANDING):  allopurinol 300 milliGRAM(s) Oral daily  atorvastatin 80 milliGRAM(s) Oral at bedtime  chlorhexidine 2% Cloths 1 Application(s) Topical <User Schedule>  chlorhexidine 4% Liquid 1 Application(s) Topical <User Schedule>  hydroxyurea 2000 milliGRAM(s) Oral two times a day  metoprolol tartrate 50 milliGRAM(s) Oral two times a day  rasburicase IVPB 3 milliGRAM(s) IV Intermittent once    MEDICATIONS  (PRN):  sodium chloride 0.9% lock flush 10 milliLiter(s) IV Push every 1 hour PRN Pre/post blood products, medications, blood draw, and to maintain line patency        LABS                                            8.9                   Neurophils% (auto):   3.4    (01-08 @ 05:51):    160.76)-----------(21           Lymphocytes% (auto):  25.9                                          27.8                   Eosinphils% (auto):   0.0      Manual%: Neutrophils x    ; Lymphocytes x    ; Eosinophils x    ; Bands%: x    ; Blasts x                                    140    |  101    |  16                  Calcium: 9.6   / iCa: x      (01-08 @ 05:51)    ----------------------------<  194       Magnesium: 1.4                              3.0     |  26     |  1.10             Phosphorous: 2.7      TPro  6.7    /  Alb  3.7    /  TBili  0.7    /  DBili  x      /  AST  24     /  ALT  14     /  AlkPhos  67     08 Jan 2025 05:51    ( 01-08 @ 05:51 )   PT: 14.5 sec;   INR: 1.26 ratio  aPTT: 23.4 sec

## 2025-01-08 NOTE — CONSULT NOTE ADULT - SUBJECTIVE AND OBJECTIVE BOX
[facility] - Blessing Moore, Jewel Morales, Calos Ackerman  Office Number: [phone]    Initial Consult Note    CHIEF COMPLAINT: Patient is a 74y old  Female who presents with a chief complaint of Hyperleukocytosis/Acute Leukemia    HPI:  73 y/o F with PMHx breast cancer s/p lumpectomy, RT and chemo in 1999 w/ local recurrence in 2023 s/p resection; colon ca s/p resection in 2021, CAD s/p CABG x2 in 2010, HTN, HLD and T2DM who initially presented to [place] for lethargy x 1 month w/ exertional SOB, acute on chronic low back pain and new b/l leg pain. She was found to have  w/ 69% blasts, transferred to [facility] for hematology evaluation of new acute leukemia and blast crisis.     Brief ED course:  VS: T 98.2, HR 86, /79, RR 16 spO2 94% on RA.  Labs: .33 1/ 69% blasts, Hgb 9.5, plt 43. K 2.6, trop 143.6, pro-BNP 1015  Imaging:CT lumbar spine showing L5-S1 diffuse disc bulging contributes to high-grade bilateral foraminal compromise without significant central canal compromise    Pt seen and evaluated by heme/onc in the ED, given allopurinol 300mg x1, rasburicase 3mg IV x1, hydroxyurea 200mg PO x1, 10 mEq K repletion. Pt was accepted directly to MICU for emergent leukapheresis.  (07 Jan 2025 21:38)      PAST MEDICAL & SURGICAL HISTORY:  Colon Cancer      Breast Ca  (L)      HTN (Hypertension)      Hyperlipidemia      Coronary artery disease      Diabetes mellitus      Vertigo      Renal insufficiency  per pt      History of Colon Resection  2001      S/P Breast Lumpectomy  (L) 1999      S/P CABG x 2  2010      H/O forearm fracture  1995 (R)          SOCIAL HISTORY:  No tobacco, ethanol, or drug abuse.    FAMILY HISTORY:  FH: breast cancer  mother      No family history of acute MI or sudden cardiac death.    MEDICATIONS  (STANDING):  allopurinol 300 milliGRAM(s) Oral daily  chlorhexidine 2% Cloths 1 Application(s) Topical <User Schedule>  chlorhexidine 4% Liquid 1 Application(s) Topical <User Schedule>  hydroxyurea 2000 milliGRAM(s) Oral two times a day  potassium chloride    Tablet ER 40 milliEquivalent(s) Oral every 2 hours    MEDICATIONS  (PRN):  sodium chloride 0.9% lock flush 10 milliLiter(s) IV Push every 1 hour PRN Pre/post blood products, medications, blood draw, and to maintain line patency      Allergies    atenolol (Unknown)  penicillin (Hives)  sulfa drugs (Hives)  shellfish (Hives)    Intolerances        REVIEW OF SYSTEMS:    CONSTITUTIONAL: No weakness, fevers or chills  EYES/ENT: No visual changes;  No vertigo or throat pain   NECK: No pain or stiffness  RESPIRATORY: No cough, wheezing, hemoptysis; No shortness of breath  CARDIOVASCULAR: No chest pain or palpitations  GASTROINTESTINAL: No abdominal pain. No nausea, vomiting, or hematemesis; No diarrhea or constipation. No melena or hematochezia.  GENITOURINARY: No dysuria, frequency or hematuria  NEUROLOGICAL: No numbness or weakness  SKIN: No itching or rash  All other review of systems is negative unless indicated above    VITAL SIGNS:   Vital Signs Last 24 Hrs  T(C): 36.5 (08 Jan 2025 08:00), Max: 37.7 (07 Jan 2025 18:40)  T(F): 97.7 (08 Jan 2025 08:00), Max: 99.9 (07 Jan 2025 19:32)  HR: 98 (08 Jan 2025 09:00) (77 - 98)  BP: 207/91 (08 Jan 2025 09:00) (141/64 - 207/91)  BP(mean): 131 (08 Jan 2025 09:00) (92 - 131)  RR: 24 (08 Jan 2025 09:00) (16 - 33)  SpO2: 95% (08 Jan 2025 09:00) (94% - 98%)    Parameters below as of 08 Jan 2025 08:00  Patient On (Oxygen Delivery Method): room air        I&O's Summary    07 Jan 2025 07:01  -  08 Jan 2025 07:00  --------------------------------------------------------  IN: 150 mL / OUT: 0 mL / NET: 150 mL    08 Jan 2025 07:01  -  08 Jan 2025 09:42  --------------------------------------------------------  IN: 290 mL / OUT: 0 mL / NET: 290 mL        On Exam:    Constitutional: NAD, alert and oriented x 3  Lungs:  Non-labored, breath sounds are clear bilaterally, No wheezing, rales or rhonchi  Cardiovascular: RRR.  S1 and S2 positive.  No murmurs, rubs, gallops or clicks  Gastrointestinal: Bowel Sounds present, soft, nontender.   Lymph: No peripheral edema. No cervical lymphadenopathy.  Neurological: Alert, no focal deficits  Skin: No rashes or ulcers   Psych:  Mood & affect appropriate.    LABS: All Labs Reviewed:                        8.9    160.76 )-----------( 21       ( 08 Jan 2025 05:51 )             27.8                         8.5    287.44 )-----------( 35       ( 07 Jan 2025 23:51 )             27.7                         9.3    298.64 )-----------( 38       ( 07 Jan 2025 18:51 )             29.5     08 Jan 2025 05:51    140    |  101    |  16     ----------------------------<  194    3.0     |  26     |  1.10   07 Jan 2025 23:51    138    |  100    |  17     ----------------------------<  163    3.0     |  23     |  1.04   07 Jan 2025 18:51    142    |  99     |  18     ----------------------------<  133    2.4     |  25     |  1.00     Ca    9.6        08 Jan 2025 05:51  Ca    9.6        07 Jan 2025 23:51  Ca    9.4        07 Jan 2025 18:51  Phos  2.7       08 Jan 2025 05:51  Phos  2.0       07 Jan 2025 23:51  Phos  2.6       07 Jan 2025 18:51  Mg     1.4       08 Jan 2025 05:51  Mg     1.4       07 Jan 2025 23:51  Mg     1.6       07 Jan 2025 18:51    TPro  6.7    /  Alb  3.7    /  TBili  0.7    /  DBili  x      /  AST  24     /  ALT  14     /  AlkPhos  67     08 Jan 2025 05:51  TPro  8.2    /  Alb  3.9    /  TBili  0.6    /  DBili  x      /  AST  37     /  ALT  18     /  AlkPhos  100    07 Jan 2025 23:51  TPro  8.5    /  Alb  4.2    /  TBili  0.7    /  DBili  x      /  AST  37     /  ALT  18     /  AlkPhos  100    07 Jan 2025 18:51    PT/INR - ( 08 Jan 2025 05:51 )   PT: 14.5 sec;   INR: 1.26 ratio         PTT - ( 08 Jan 2025 05:51 )  PTT:23.4 sec      Blood Culture:         RADIOLOGY:    EKG: SR, PAC, RBBB, nss t wave changes      TTE 2021:  Summary:   1. Left ventricular ejection fraction, by visual estimation, is 65 to 70%.   2. Normal global left ventricular systolic function.   3. Spectral Doppler shows impaired relaxation pattern of left ventricular myocardial filling (Grade I diastolic dysfunction).   4. Trace mitral valve regurgitation.   5. Sclerotic aortic valve with normal opening.

## 2025-01-08 NOTE — PHYSICAL THERAPY INITIAL EVALUATION ADULT - NSPTDISCHREC_GEN_A_CORE
DC home with home PT services, assist from spouse as needed, owns cane and shower chair, AGUSTÍN Lainez to be notified./Home PT

## 2025-01-08 NOTE — PHYSICAL THERAPY INITIAL EVALUATION ADULT - PLANNED THERAPY INTERVENTIONS, PT EVAL
stair neg: GOAL: pt will neg 3 steps to enter and flight of stairs in home ind in 4wks./balance training/gait training

## 2025-01-08 NOTE — PROCEDURE NOTE - ADDITIONAL PROCEDURE DETAILS
+ semi dry tap, attempted twice, + marrow. + semi dry tap, attempted twice, + marrow.    Hematology/Oncology Procedure Note    Bone Marrow Aspiration/Biopsy    Indication:     Bone marrow aspiration and biopsy procedure description, risks, and benefits were discussed in detail with the patient.  All questions were answered.  Informed consent was obtained and time-out performed.      The area of the right/left posterior iliac crest was prepped and draped using sterile technique. Local anesthetic with 2% Lidocaine.    Bone marrow aspiration and biopsy was performed using sterile technique by Dr. Rachel Mueller assist and supervision by Dr. Darlyn Bailey. Specimens were obtained. No complications and less than 2 cc of blood loss.     The procedure was well tolerated and no local bleeding or other complications were observed.  Pressure was applied to the procedure site and a wound dressing was placed.  The patient and nursing staff were advised that the patient is to lie flat for 30 minutes post procedure and not to shower or change the dressing for 24 hours. Tylenol may be used if no contraindications for pain at the procedure site.

## 2025-01-09 NOTE — PROGRESS NOTE ADULT - SUBJECTIVE AND OBJECTIVE BOX
Patient is a 74y old  Female who presents with a chief complaint of Hyperleukocytosis/Acute Leukemia (07 Jan 2025 23:36)      24 hour events: *** Pt had her 2nd Leukopheresis last night. Pt also received a unit of plt and cryo overnight    75 y/o F with PMHx breast cancer s/p lumpectomy, RT and chemo in 1999 w/ local recurrence in 2023 s/p resection; colon ca s/p resection in 2021, CAD s/p CABG x2 in 2010, HTN, HLD and T2DM who initially presented to [place] for lethargy x 1 month w/ exertional SOB, acute on chronic low back pain and new b/l leg pain. She was found to have  w/ 69% blasts, transferred to [facility] for hematology evaluation of new acute leukemia and blast crisis. Pt seen and evaluated by heme/onc in the ED, given allopurinol 300mg x1, rasburicase 3mg IV x1, hydroxyurea 200mg PO x1, 10 mEq K repletion. Pt was accepted directly to MICU for emergent leukapheresis.     Brief ED course:  VS: T 98.2, HR 86, /79, RR 16 spO2 94% on RA.  Labs: .33 1/ 69% blasts, Hgb 9.5, plt 43. K 2.6, trop 143.6, pro-BNP 1015  Imaging:CT lumbar spine showing L5-S1 diffuse disc bulging contributes to high-grade bilateral foraminal compromise without significant central canal compromise      OBJECTIVE:  ICU Vital Signs Last 24 Hrs  T(C): 36.4 (08 Jan 2025 04:00), Max: 37.7 (07 Jan 2025 18:40)  T(F): 97.6 (08 Jan 2025 04:00), Max: 99.9 (07 Jan 2025 19:32)  HR: 84 (08 Jan 2025 06:00) (79 - 96)  BP: 161/70 (08 Jan 2025 06:00) (141/64 - 206/82)  BP(mean): 101 (08 Jan 2025 06:00) (92 - 122)  ABP: --  ABP(mean): --  RR: 20 (08 Jan 2025 06:00) (16 - 33)  SpO2: 95% (08 Jan 2025 06:00) (94% - 98%)    O2 Parameters below as of 07 Jan 2025 22:30  Patient On (Oxygen Delivery Method): room air          01-07 @ 07:01  -  01-08 @ 07:00  --------------------------------------------------------  IN: 150 mL / OUT: 0 mL / NET: 150 mL      CAPILLARY BLOOD GLUCOSE            PHYSICAL EXAM:  CONSTITUTIONAL: well-nourished, well-groomed laying comfortably in bed in NAD  EYES: mild arcus senilis b/o, PERRLA and symmetric, EOMI, No conjunctival or scleral injection, non-icteric  ENMT: large nodular torus palatinus  NECK: R internal jugular catheter, soft, supple, trachea midline  RESP: No respiratory distress, no use of accessory muscles; CTA b/l in anterior lung fields no WRR  CV: tachycardic, normal S1/S2, normal rhythm  GI: obese, soft, nondistended, +TTP in LLQ, no rebound tenderness  MSK: Gait not assessed; strength 5/5 in b/l lower extremities  SKIN: No rashes or ulcers noted  NEURO: AOx4, nonfocal  PSYCH: mood and affect appropriate to situation    HOSPITAL MEDICATIONS:  MEDICATIONS  (STANDING):  allopurinol 300 milliGRAM(s) Oral daily  chlorhexidine 2% Cloths 1 Application(s) Topical <User Schedule>  chlorhexidine 4% Liquid 1 Application(s) Topical <User Schedule>  hydroxyurea 2000 milliGRAM(s) Oral two times a day    MEDICATIONS  (PRN):  sodium chloride 0.9% lock flush 10 milliLiter(s) IV Push every 1 hour PRN Pre/post blood products, medications, blood draw, and to maintain line patency      LABS:                                       9.0    85.91 )-----------( 13       ( 09 Jan 2025 00:50 )             28.0     Hgb Trend: 9.0<--, 9.5<--, 8.9<--, 8.5<--, 9.3<--  01-09    136  |  100  |  17  ----------------------------<  207[H]  4.5   |  26  |  1.05    Ca    9.7      09 Jan 2025 00:50  Phos  2.8     01-09  Mg     1.7     01-09    TPro  6.6  /  Alb  3.7  /  TBili  1.4[H]  /  DBili  x   /  AST  24  /  ALT  10  /  AlkPhos  62  01-09    Creatinine Trend: 1.05<--, 1.10<--, 1.04<--, 1.00<--, 1.20<--  PT/INR - ( 09 Jan 2025 00:50 )   PT: 14.5 sec;   INR: 1.27 ratio         PTT - ( 09 Jan 2025 00:50 )  PTT:27.3 sec      Urinalysis Basic - ( 09 Jan 2025 00:50 )    Color: x / Appearance: x / SG: x / pH: x  Gluc: 207 mg/dL / Ketone: x  / Bili: x / Urobili: x   Blood: x / Protein: x / Nitrite: x   Leuk Esterase: x / RBC: x / WBC x   Sq Epi: x / Non Sq Epi: x / Bacteria: x      MICROBIOLOGY:     Radiology: ***    Bedside lung ultrasound: ***    Bedside ECHO: ***    EKG:    CENTRAL LINE: Y/N          DATE INSERTED:              REMOVE: Y/N    WHITE: Y/N                        DATE INSERTED:              REMOVE: Y/N    A-LINE: Y/N                       DATE INSERTED:              REMOVE: Y/N    GLOBAL ISSUE/BEST PRACTICE:  Analgesia:  Sedation:  HOB elevation: yes  Stress ulcer prophylaxis:  VTE prophylaxis:  Glycemic control:  Nutrition:    CODE STATUS: ***  [facility] discussion: Y

## 2025-01-09 NOTE — OCCUPATIONAL THERAPY INITIAL EVALUATION ADULT - NSOTDMEREC_GEN_A_CORE
Patient will require rolling walker due to decreased strength, endurance, balance to increase safety and independence for functional mobility; pt owns cane, shower chair/rolling walker

## 2025-01-09 NOTE — PROGRESS NOTE ADULT - SUBJECTIVE AND OBJECTIVE BOX
[facility] - Blessing Moore, Jewel Morales, Calos Ackerman  Office Number:  [phone]    Patient resting comfortably in bed in NAD.  Laying flat with no respiratory distress.  No complaints of chest pain, dyspnea, palpitations, PND, or orthopnea.  Feels very fatigued this morning  Remains on RA  RIJ catheter removed    ROS: negative unless otherwise mentioned.    Telemetry: SR    MEDICATIONS  (STANDING):  allopurinol 300 milliGRAM(s) Oral daily  atorvastatin 80 milliGRAM(s) Oral at bedtime  chlorhexidine 2% Cloths 1 Application(s) Topical <User Schedule>  chlorhexidine 4% Liquid 1 Application(s) Topical <User Schedule>  hydroxyurea 2000 milliGRAM(s) Oral two times a day  insulin lispro (ADMELOG) corrective regimen sliding scale   SubCutaneous Before meals and at bedtime  metoprolol tartrate 50 milliGRAM(s) Oral two times a day    MEDICATIONS  (PRN):  sodium chloride 0.9% lock flush 10 milliLiter(s) IV Push every 1 hour PRN Pre/post blood products, medications, blood draw, and to maintain line patency      Allergies    atenolol (Unknown)  penicillin (Hives)  sulfa drugs (Hives)  shellfish (Hives)    Intolerances        Vital Signs Last 24 Hrs  T(C): 36.8 (09 Jan 2025 08:00), Max: 37.3 (08 Jan 2025 12:00)  T(F): 98.2 (09 Jan 2025 08:00), Max: 99.2 (08 Jan 2025 12:00)  HR: 80 (09 Jan 2025 09:00) (71 - 100)  BP: 136/61 (09 Jan 2025 09:00) (98/56 - 198/91)  BP(mean): 88 (09 Jan 2025 09:00) (70 - 120)  RR: 23 (09 Jan 2025 09:00) (13 - 33)  SpO2: 93% (09 Jan 2025 09:00) (86% - 100%)    Parameters below as of 09 Jan 2025 08:00  Patient On (Oxygen Delivery Method): room air        I&O's Summary    08 Jan 2025 07:01  -  09 Jan 2025 07:00  --------------------------------------------------------  IN: 1997 mL / OUT: 0 mL / NET: 1997 mL    09 Jan 2025 07:01  -  09 Jan 2025 09:20  --------------------------------------------------------  IN: 200 mL / OUT: 0 mL / NET: 200 mL        ON EXAM:    General: NAD, awake and alert, oriented x 3  HEENT: Mucous membranes are moist, anicteric  Lungs: Non-labored, breath sounds are clear bilaterally, No wheezing, rales or rhonchi  Cardiovascular: Regular, S1 and S2, 2/6 SM   Gastrointestinal: Bowel Sounds present, soft, nontender.   Lymph: No peripheral edema. No lymphadenopathy.  Skin: No rashes or ulcers  Psych:  Mood & affect appropriate    LABS: All Labs Reviewed:                        9.0    85.91 )-----------( 13       ( 09 Jan 2025 00:50 )             28.0                         9.5    182.16 )-----------( 23       ( 08 Jan 2025 15:58 )             29.2                         8.9    160.76 )-----------( 21       ( 08 Jan 2025 05:51 )             27.8     09 Jan 2025 00:50    136    |  100    |  17     ----------------------------<  207    4.5     |  26     |  1.05   08 Jan 2025 05:51    140    |  101    |  16     ----------------------------<  194    3.0     |  26     |  1.10   07 Jan 2025 23:51    138    |  100    |  17     ----------------------------<  163    3.0     |  23     |  1.04     Ca    9.7        09 Jan 2025 00:50  Ca    9.6        08 Jan 2025 05:51  Ca    9.6        07 Jan 2025 23:51  Phos  2.8       09 Jan 2025 00:50  Phos  2.7       08 Jan 2025 05:51  Phos  2.0       07 Jan 2025 23:51  Mg     1.7       09 Jan 2025 00:50  Mg     1.4       08 Jan 2025 05:51  Mg     1.4       07 Jan 2025 23:51    TPro  6.6    /  Alb  3.7    /  TBili  1.4    /  DBili  x      /  AST  24     /  ALT  10     /  AlkPhos  62     09 Jan 2025 00:50  TPro  6.7    /  Alb  3.7    /  TBili  0.7    /  DBili  x      /  AST  24     /  ALT  14     /  AlkPhos  67     08 Jan 2025 05:51  TPro  8.2    /  Alb  3.9    /  TBili  0.6    /  DBili  x      /  AST  37     /  ALT  18     /  AlkPhos  100    07 Jan 2025 23:51    PT/INR - ( 09 Jan 2025 00:50 )   PT: 14.5 sec;   INR: 1.27 ratio         PTT - ( 09 Jan 2025 00:50 )  PTT:27.3 sec      Blood Culture: Organism --  Gram Stain Blood -- Gram Stain --  Specimen Source .Blood BLOOD  Culture-Blood --    Organism --  Gram Stain Blood -- Gram Stain --  Specimen Source .Blood BLOOD  Culture-Blood --    Organism --  Gram Stain Blood -- Gram Stain --  Specimen Source .Blood BLOOD  Culture-Blood --    Organism --  Gram Stain Blood -- Gram Stain --  Specimen Source .Blood BLOOD  Culture-Blood --    TTE 1/8/25:  CONCLUSIONS:      1. Left ventricular cavity is normal in size. Left ventricular wall thickness is normal. Left ventricular systolic function is normal with an ejection fraction visually estimated at 60 to 65 %. There are no regional wall motion abnormalities seen.    Normal RV function  No sig valvular disease  RAP 3

## 2025-01-09 NOTE — CHART NOTE - NSCHARTNOTEFT_GEN_A_CORE
Assessment: 74F PMHx left breast cancer, completely resected colon cancer in remission, CAD s/p CABG x2 in 2010, T2DM not on insulin, gout, HTN, HLD presented initially to [facility] with lower back pain. Transferred to [facility] for hyperleukocytosis with reported blasts concerning for new leukemia.  Patient presented with WBC 298k and blast count approximately 90% noted on manual differential.There was marked leukocytosis with predominantly large blasts (approximately 90%). No Bran rods present.    Therapeutic Leukapheresis#1 On 01/07/25 .Patient underwent one procedure of therapeutic leukapheresis (1.5 TBV, 7500mL) with 5% albumin replacement Patient tolerated the procedure well.    On 01/08/25 transfusion medicine was contacted for a second therapeutic leukapheresis as WBC counts were 180k.    Therapeutic Leukapheresis#2 On 01/08/25.Patient underwent one procedure of therapeutic leukapheresis (1.5 TBV, 7500mL) with 5% albumin replacement fluid. Right Shiley was already present, [facility] nurse performed the procedure in the evening of 01/08/25. Patient tolerated the procedure well.

## 2025-01-09 NOTE — PROGRESS NOTE ADULT - ASSESSMENT
74F hx presented initially to [facility] with lower back pain and transferred to [facility] for hyperleukocytosis with reported blasts concerning for newly diagnosed acute myeloid leukemia.    PMHx: left breast cancer s/p lumpectomy, RT and chemo, colon cancer s/p resection, CAD s/p CABG x2 in 2010, T2DM not on insulin, gout, HTN, HLD     #Acute Myeloid Leukemia  - Patient presenting with .33 and blast 69% noted on manual differential.   - Peripheral smear reviewed: RBCs are normocytic, hypochromic and have slight anisocytosis. Occasional target cells are seen. Slight polychromasia is noted. No basophilic stippling is seen. Occasional nucleated RBCs are seen. No Boss-Jolly bodies or siderotic granules are seen. WBCs appear markedly increased in number with almost entirely blast-like cells without Bran rods. Platelets appear decreased in number. Platelets appear normal morphologically with small forms.  - Based on her prior two peripheral blood flow cytometry results in 2019 and 2020, have a slightly higher suspicion for a myelomonocytic leukemia.  - Iron panel c/w ACD. pending G6PD HIV and hepatitis panel.   - S/p 2 sessions of leukapheresis: 1/8/24 - 1/9/24, tolerated well; repeat wbc today 87k  - TTE 1/6/25: EF 65%   - TM Interpretation 1/8/25:  Abnormal myeloblasts (93%), consisent with acute myeloid leukemia. See note and interpretation. FISH for PML-SOULEYMANE is negative. The overall findings are consistent with AML.      Recommendations:   - Continue allopurinol 300 mg qd as initial uric acid 9.7  - Continue with hydroxyurea 2000 mg BID for cytoreduction.  - Will hold of on further leukapheresis at this time.   - S/p bedside BMBx; dry tap but able to obtain marrow; expediated results   - Please check CBC with Diff, coags, fibrinogen Clauss, d-dimer and TLS labs (uric acid, LDH, Mg, Phos) DAILY  - Please give cryo for fibrinogen <150   - If uric acid > 8, give 3 mg IV Rasburicase x1; if >12 give 6 mg IV x1. Initial uric acid 9.7, ordered rasburicase 3 mg IV STAT after confirmed G6PD level was sent. Testing for uric acid must be sent on ice for 5 days after rasburicase administration to avoid falsely low uric acid levels.   - Transfuse for Hgb < 7 and platelets < 10k, < 15k if febrile and < 50k if non-CNS bleeding or requiring procedures  - Plan to transfer to [address] [place] [address] when bed available    Note not finalized until signed by attending.   Please do not hesitate to page with questions.     Darlyn Bailey MD  Hematology/Oncology Fellow PGY5  Available on Microsoft Teams   Pager: [phone]  For weekends and evenings (5 pm - 8 am), please page fellow on call.

## 2025-01-09 NOTE — PROGRESS NOTE ADULT - NS ATTEND AMEND GEN_ALL_CORE FT
73 y/o F w/hx as above including prior breast cancer and colon cancer, and CAD s/p CABG admitted for blast crisis in setting of presumed leukemia.     - Leukopheresis and chemo as per oncology  - Monitor chemistry for tumor lysis   - Rasburicase, allopurinol, hydroxyurea  - Downgrade to oncology service  - DVT prophylaxis

## 2025-01-09 NOTE — PROGRESS NOTE ADULT - SUBJECTIVE AND OBJECTIVE BOX
Hematology Follow-up    INTERVAL HPI/OVERNIGHT EVENTS:  Patient seen and evaluated at bedside, was tearful and overwhelmed with everything going on. S/p first session of leukopheresis today, tolerated well.     VITAL SIGNS:  T(F): 98 (01-09-25 @ 12:00)  HR: 83 (01-09-25 @ 14:00)  BP: 164/73 (01-09-25 @ 14:00)  RR: 22 (01-09-25 @ 14:00)  SpO2: 96% (01-09-25 @ 14:00)  Wt(kg): --    PHYSICAL EXAM:  GENERAL: NAD  HEAD:  Atraumatic, Normocephalic  EYES: EOMI, conjunctiva and sclera clear  CHEST/LUNG: Clear to auscultation bilaterally  HEART: Regular rate, irregularly irregular rhythm  ABDOMEN: Soft, Nontender, Nondistended  EXTREMITIES:  2+ Peripheral Pulses. Trace pitting edema b/l LE. No clubbing or cyanosis.  NEUROLOGY: non-focal  SKIN: No rashes or lesions    MEDICATIONS  (STANDING):  allopurinol 300 milliGRAM(s) Oral daily  atorvastatin 80 milliGRAM(s) Oral at bedtime  chlorhexidine 2% Cloths 1 Application(s) Topical <User Schedule>  chlorhexidine 4% Liquid 1 Application(s) Topical <User Schedule>  hydroxyurea 2000 milliGRAM(s) Oral two times a day  insulin lispro (ADMELOG) corrective regimen sliding scale   SubCutaneous Before meals and at bedtime  metoprolol tartrate 50 milliGRAM(s) Oral two times a day    MEDICATIONS  (PRN):  sodium chloride 0.9% lock flush 10 milliLiter(s) IV Push every 1 hour PRN Pre/post blood products, medications, blood draw, and to maintain line patency      atenolol (Unknown)  penicillin (Hives)  sulfa drugs (Hives)  shellfish (Hives)      LABS:                        8.9    87.92 )-----------( 55       ( 09 Jan 2025 13:13 )             26.8     01-09    136  |  100  |  17  ----------------------------<  207[H]  4.5   |  26  |  1.05    Ca    9.7      09 Jan 2025 00:50  Phos  2.8     01-09  Mg     1.7     01-09    TPro  6.6  /  Alb  3.7  /  TBili  1.4[H]  /  DBili  x   /  AST  24  /  ALT  10  /  AlkPhos  62  01-09    PT/INR - ( 09 Jan 2025 00:50 )   PT: 14.5 sec;   INR: 1.27 ratio         PTT - ( 09 Jan 2025 00:50 )  PTT:27.3 sec Lactate Dehydrogenase, Serum: 400 U/L (01-09 @ 00:50)    Urinalysis Basic - ( 09 Jan 2025 00:50 )    Color: x / Appearance: x / SG: x / pH: x  Gluc: 207 mg/dL / Ketone: x  / Bili: x / Urobili: x   Blood: x / Protein: x / Nitrite: x   Leuk Esterase: x / RBC: x / WBC x   Sq Epi: x / Non Sq Epi: x / Bacteria: x        RADIOLOGY & ADDITIONAL TESTS:  Studies reviewed.   Hematology Follow-up    INTERVAL HPI/OVERNIGHT EVENTS:  Patient seen and evaluated at bedside,  s/p second session of leukopheresis today, tolerated well. No overnight events.    VITAL SIGNS:  T(F): 98 (01-09-25 @ 12:00)  HR: 83 (01-09-25 @ 14:00)  BP: 164/73 (01-09-25 @ 14:00)  RR: 22 (01-09-25 @ 14:00)  SpO2: 96% (01-09-25 @ 14:00)  Wt(kg): --    PHYSICAL EXAM:  GENERAL: NAD  HEAD:  Atraumatic, Normocephalic  EYES: EOMI, conjunctiva and sclera clear  CHEST/LUNG: Clear to auscultation bilaterally  HEART: Regular rate, irregularly irregular rhythm  ABDOMEN: Soft, Nontender, Nondistended  EXTREMITIES:  2+ Peripheral Pulses. Trace pitting edema b/l LE. No clubbing or cyanosis.  NEUROLOGY: non-focal  SKIN: No rashes or lesions    MEDICATIONS  (STANDING):  allopurinol 300 milliGRAM(s) Oral daily  atorvastatin 80 milliGRAM(s) Oral at bedtime  chlorhexidine 2% Cloths 1 Application(s) Topical <User Schedule>  chlorhexidine 4% Liquid 1 Application(s) Topical <User Schedule>  hydroxyurea 2000 milliGRAM(s) Oral two times a day  insulin lispro (ADMELOG) corrective regimen sliding scale   SubCutaneous Before meals and at bedtime  metoprolol tartrate 50 milliGRAM(s) Oral two times a day    MEDICATIONS  (PRN):  sodium chloride 0.9% lock flush 10 milliLiter(s) IV Push every 1 hour PRN Pre/post blood products, medications, blood draw, and to maintain line patency      atenolol (Unknown)  penicillin (Hives)  sulfa drugs (Hives)  shellfish (Hives)      LABS:                        8.9    87.92 )-----------( 55       ( 09 Jan 2025 13:13 )             26.8     01-09    136  |  100  |  17  ----------------------------<  207[H]  4.5   |  26  |  1.05    Ca    9.7      09 Jan 2025 00:50  Phos  2.8     01-09  Mg     1.7     01-09    TPro  6.6  /  Alb  3.7  /  TBili  1.4[H]  /  DBili  x   /  AST  24  /  ALT  10  /  AlkPhos  62  01-09    PT/INR - ( 09 Jan 2025 00:50 )   PT: 14.5 sec;   INR: 1.27 ratio         PTT - ( 09 Jan 2025 00:50 )  PTT:27.3 sec Lactate Dehydrogenase, Serum: 400 U/L (01-09 @ 00:50)    Urinalysis Basic - ( 09 Jan 2025 00:50 )    Color: x / Appearance: x / SG: x / pH: x  Gluc: 207 mg/dL / Ketone: x  / Bili: x / Urobili: x   Blood: x / Protein: x / Nitrite: x   Leuk Esterase: x / RBC: x / WBC x   Sq Epi: x / Non Sq Epi: x / Bacteria: x        RADIOLOGY & ADDITIONAL TESTS:  Studies reviewed.

## 2025-01-09 NOTE — OCCUPATIONAL THERAPY INITIAL EVALUATION ADULT - GENERAL OBSERVATIONS, REHAB EVAL
decreased independence with ADLs and functional transfers pt received semisupine in bed +ICU monitoring, IVs

## 2025-01-09 NOTE — OCCUPATIONAL THERAPY INITIAL EVALUATION ADULT - PERTINENT HX OF CURRENT PROBLEM, REHAB EVAL
75 y/o F with PMHx breast cancer s/p lumpectomy, RT and chemo in 1999 w/ local recurrence in 2023 s/p resection; colon ca s/p resection in 2021, CAD s/p CABG x2 in 2010, HTN, HLD and T2DM who initially presented to [place] for lethargy x 1 month w/ exertional SOB, acute on chronic low back pain and new b/l leg pain. She was found to have  w/ 69% blasts, transferred to [facility] for hematology evaluation of new acute leukemia and blast crisis.

## 2025-01-09 NOTE — PROGRESS NOTE ADULT - ASSESSMENT
75 y/o F with PMH breast cancer and colon cancer s/p resection and in remission, CAD s/p CABG x2, T2DM, gout, HTN, HLD directly admitted to MICU after presenting to [facility] w/ hyperleukocytosis w/ blast crises c/f new leukemia.    NEURO:   -currently at baseline A&O x4   -PT/OT as tolerates    PULM:  - no active issues at this time   on RA o2sat >95%    CV:  #Elevated Troponin  - likely in setting of demand ischemia, pt w/o chest pain currently  - trop 57, EKG w/o changes  - trend trop to peak   -TTE 1/8 with normal LV systolic function EF 60-65%  #CAD s/p CABG (2010)   - home meds    GI:   - DASH diet  - no active issues     /RENAL:   #Hypokalemia  - replete for K>4, Phos >3, Mg >2    ID:   #Leukocytosis  - likely 2/2 to blast crisis  - will r/o infectious etiology w/ U/A, UCX, RVP, CXR, blood cx x2   - hold off on abx at this time  - trend fever curve     HEME/ONC:   #Leukostasis  #Blast crisis   #Acute leukemia   #Anemia  #Thrombocytopenia   #Breast ca  #Colon ca    - s/p left breast lumpectomy in 1999  - s/p resection in 2001   - uric acid 9.7, ddimer 17,476 .33, blasts 69% on admission; peripheral smear w/ almost entirely blast-like cells w/o Bran rods  - s/p leukapheresis x2 (1/7 & 1/8)  - s/p 3mg IV rasburicase x1 in ED and on 1/8/24  - per heme if uric acid >8, give rasburicase x1; if >12 give 6mg IV x1  - c/w allopurinol 300mg qd for TLS ppx (uptitrated from home dose of 100mg QD)  - c/w hydroxyurea 2000 mg BID for cytoreduction  - per heme recs: trend cbc w/ diff, coags, fibrinogen clauss, d-dimer and TLS labs (uric acid, LDH, mg, phos) daily  - Heme onc following, recs appreciated  - maintain active type and screen  - transfuse for Hgb >7, Plt<10k, <20 w/ fever< 50k w/ bleed   -Pt received a unit of plt today (1/9) for plt of 13 and a bag of cryo for fibrinogen 145  -d/c right IJ Shiley today (1/9)    #DVT Ppx:  - Scds   73 y/o F with PMH breast cancer and colon cancer s/p resection and in remission, CAD s/p CABG x2, T2DM, gout, HTN, HLD directly admitted to MICU after presenting to [facility] w/ hyperleukocytosis w/ blast crises c/f new leukemia.    NEURO:   -currently at baseline A&O x4   -PT/OT as tolerates    PULM:  - no active issues at this time   on RA o2sat >95%    CV:  #Elevated Troponin  - likely in setting of demand ischemia, pt w/o chest pain currently  - trop 57, EKG w/o changes  - trend trop to peak   -TTE 1/8 with normal LV systolic function EF 60-65%  #CAD s/p CABG (2010)   - c/w Lipitor and metoprolol home dose    GI:   - DASH diet  - no active issues     /RENAL:   #Hypokalemia  - replete for K>4, Phos >3, Mg >2    ID:   #Leukocytosis  - likely 2/2 to blast crisis  - will r/o infectious etiology w/ U/A, UCX, RVP, CXR, blood cx x2   - hold off on abx at this time  - trend fever curve     HEME/ONC:   #Leukostasis  #Blast crisis   #Acute leukemia   #Anemia  #Thrombocytopenia   #Breast ca  #Colon ca    - s/p left breast lumpectomy in 1999  - s/p resection in 2001   - uric acid 9.7, ddimer 17,476 .33, blasts 69% on admission; peripheral smear w/ almost entirely blast-like cells w/o Bran rods  - s/p leukapheresis x2 (1/7 & 1/8)  - s/p 3mg IV rasburicase x1 in ED and on 1/8/24  - per heme if uric acid >8, give rasburicase x1; if >12 give 6mg IV x1  - c/w allopurinol 300mg qd for TLS ppx (uptitrated from home dose of 100mg QD)  - c/w hydroxyurea 2000 mg BID for cytoreduction  - per heme recs: trend cbc w/ diff, coags, fibrinogen clauss, d-dimer and TLS labs (uric acid, LDH, mg, phos) daily  - Heme onc following, recs appreciated  - maintain active type and screen  - transfuse for Hgb >7, Plt<10k, <20 w/ fever< 50k w/ bleed   -Pt received a unit of plt today (1/9) for plt of 13 and a bag of cryo for fibrinogen 145  -d/c right IJ Shiley today (1/9)    #DVT Ppx:  - Scds

## 2025-01-09 NOTE — CHART NOTE - NSCHARTNOTEFT_GEN_A_CORE
Patient is a 74y old  Female who presents with a chief complaint of Hyperleukocytosis/Acute Leukemia (07 Jan 2025 23:36)      Event Summary:   Notified by RN patient with fever, Temp 101 .   Patient seen and examined patient at bedside.  Patient is alert, denies HA, visual changes, CP, SOB, cough, N/V, dysuria, or abd pain.    >VITAL SIGNS:  T(C): 38.2 (01-09-25 @ 23:22), Max: 38.2 (01-09-25 @ 23:22)  T(F): 100.8 (01-09-25 @ 23:22), Max: 100.8 (01-09-25 @ 23:22)  HR: 72 (01-09-25 @ 23:22) (66 - 90)  BP: 150/69 (01-09-25 @ 23:22) (98/56 - 164/73)  RR: 20 (01-09-25 @ 23:22) (18 - 29)  SpO2: 96% (01-09-25 @ 23:22) (92% - 98%)      >Review Of Systems:   CONSTITUTIONAL:  No fever.   No chills  EYES: No visual changes.    ENT:  No  throat pain.  No neck stiffness  RESPIRATORY: No cough, wheezing, hemoptysis; No shortness of breath  CARDIOVASCULAR: No chest pain or palpitations  GASTROINTESTINAL: No abdominal or epigastric pain.  No diarrhea.    GENITOURINARY: No dysuria, frequency or hematuria  NEUROLOGICAL: No numbness or weakness  SKIN: No itching, burning, rashes, or lesions       >PHYSICAL EXAM:  Constitutional: AOx3. NAD.  Neuro:  Grossly intact   Mouth:   Cardiovascular: +S1 S2. RRR.  No murmurs.  No LE edema.  Respiratory:  Even, unlabored.  Clear lungs bilaterally. No wheezing, rhonchi, or crackles.  Gastrointestinal: +BS X4 active. Soft. Nontender. Nondistended   Extremities/Vascular: +2 pulses bilaterally.   Skin:  +Feverish to touch     >MEDICATIONS:    MEDICATIONS  (STANDING):  acetaminophen     Tablet .. 650 milliGRAM(s) Oral once  allopurinol 300 milliGRAM(s) Oral daily  atorvastatin 80 milliGRAM(s) Oral at bedtime  cefepime   IVPB      chlorhexidine 2% Cloths 1 Application(s) Topical <User Schedule>  chlorhexidine 4% Liquid 1 Application(s) Topical <User Schedule>  hydroxyurea 2000 milliGRAM(s) Oral two times a day  insulin lispro (ADMELOG) corrective regimen sliding scale   SubCutaneous Before meals and at bedtime  metoprolol tartrate 50 milliGRAM(s) Oral two times a day  polyethylene glycol 3350 17 Gram(s) Oral daily  senna 2 Tablet(s) Oral at bedtime  vancomycin  IVPB 1000 milliGRAM(s) IV Intermittent once      >LABORATORY:                          8.9    87.92 )-----------( 55       ( 09 Jan 2025 13:13 )             26.8       01-09    136  |  100  |  17  ----------------------------<  207[H]  4.5   |  26  |  1.05    Ca    9.7      09 Jan 2025 00:50  Phos  2.8     01-09  Mg     1.7     01-09    TPro  6.6  /  Alb  3.7  /  TBili  1.4[H]  /  DBili  x   /  AST  24  /  ALT  10  /  AlkPhos  62  01-09          >MICROBIOLOGY:     Urine Culture:   Blood Culture:    >RADIOLOGY:    CXR:     >ASSESSMENT/PLAN:   HPI:  75 y/o F with PMHx breast cancer s/p lumpectomy, RT and chemo in 1999 w/ local recurrence in 2023 s/p resection; colon ca s/p resection in 2021, CAD s/p CABG x2 in 2010, HTN, HLD and T2DM who initially presented to [facility] for lethargy x 1 month w/ exertional SOB, acute on chronic low back pain and new b/l leg pain. She was found to have  w/ 69% blasts, transferred to [facility] for hematology evaluation of new acute leukemia and blast crisis.     Brief ED course:  VS: T 98.2, HR 86, /79, RR 16 spO2 94% on RA.  Labs: .33 1/ 69% blasts, Hgb 9.5, plt 43. K 2.6, trop 143.6, pro-BNP 1015  Imaging:CT lumbar spine showing L5-S1 diffuse disc bulging contributes to high-grade bilateral foraminal compromise without significant central canal compromise    Pt seen and evaluated by heme/onc in the ED, given allopurinol 300mg x1, rasburicase 3mg IV x1, hydroxyurea 200mg PO x1, 10 mEq K repletion. Pt was accepted directly to MICU for emergent leukapheresis.  (07 Jan 2025 21:38)      1) Fever - Recurrent   - Tylenol / Cooling measures prn for Pyrexia  - BC x2, CBC, UA/UC  - CXR  - 1gm x dose stat Vanc  - Cefepime 2gm stat and then 8 hrs  - F/U Primary  care team in AM     Discussed the plan with overnight fellow Dr Paula Fleming.    Arnoldo Cisneros PA-C  Department of Medicine  Spectralink

## 2025-01-09 NOTE — PROGRESS NOTE ADULT - ASSESSMENT
Ellie is a 75 y/o F PMH breast cancer in remission('99, '23), colon cancer '01 s/p resection in remission, HTN, HLD, DM2, CAD status post CABG @ [facility] &, vertigo.    Presents to [place] ED 1/7 c/o generalized back pain x 3 months with some abdominal discomfort over the past 1 week. She has been feeling some profound weakness and fatigue and has been unable to carry on ADLs.  Over the past 3 days or so she has had some dyspnea on exertion. Patient is having some left upper leg discomfort.      - mild troponin elevation, though no suggestion of acs (Trop I 143--> Trop T 57)  - ekg with sr, rbbb, nsst abn  - TTE with normal BiV function, no WMA noted.   - without cp or dyspnea during my exam  - found to have significantly elevated WBC at >280k, now markedly decreased after leukapheresis   - troponin elevation 2/2 demand ischemia in the setting of new leukemia, likely AML. S/p BMB as well.   - Heme onc following. S/p RIJ catheter placed s/p therapeutic leukapheresis   - bnp elevated, though no does not appear volume overloaded  - Needs BP control, would place on Amlodipine to start  - will follow with you.    #CAD s/p CABG:  - On home Statin  - Remains on BB  - ASA on hold given significant thrombocytopenia

## 2025-01-10 NOTE — DISCHARGE NOTE PROVIDER - NSDCCPCAREPLAN_GEN_ALL_CORE_FT
PRINCIPAL DISCHARGE DIAGNOSIS  Diagnosis: Acute myeloid leukemia  Assessment and Plan of Treatment: Notify provider  or report to ER for fever greater or equal to 100.4, persistent nausea,  uncontoled vomiting, watery diarrhea, or bleeding.

## 2025-01-10 NOTE — PROGRESS NOTE ADULT - SUBJECTIVE AND OBJECTIVE BOX
[facility] - Blessing Moore, Jewel Morales, Calos Ackerman  Office Number:  [phone]    Patient resting comfortably in bed in NAD.  Laying flat with no respiratory distress.  No complaints of chest pain, dyspnea, palpitations, PND, or orthopnea.  febrile overnight    ROS: negative unless otherwise mentioned.    Telemetry:  off    MEDICATIONS  (STANDING):  allopurinol 300 milliGRAM(s) Oral daily  atorvastatin 80 milliGRAM(s) Oral at bedtime  cefepime   IVPB      cefepime   IVPB 2000 milliGRAM(s) IV Intermittent every 8 hours  chlorhexidine 2% Cloths 1 Application(s) Topical <User Schedule>  chlorhexidine 4% Liquid 1 Application(s) Topical <User Schedule>  hydroxyurea 2000 milliGRAM(s) Oral two times a day  insulin lispro (ADMELOG) corrective regimen sliding scale   SubCutaneous Before meals and at bedtime  metoprolol tartrate 50 milliGRAM(s) Oral two times a day  polyethylene glycol 3350 17 Gram(s) Oral daily  senna 2 Tablet(s) Oral at bedtime    MEDICATIONS  (PRN):  sodium chloride 0.9% lock flush 10 milliLiter(s) IV Push every 1 hour PRN Pre/post blood products, medications, blood draw, and to maintain line patency      Allergies    atenolol (Unknown)  penicillin (Hives)  sulfa drugs (Hives)  shellfish (Hives)    Intolerances        Vital Signs Last 24 Hrs  T(C): 36.8 (10 Morgan 2025 05:50), Max: 38.3 (09 Jan 2025 22:15)  T(F): 98.2 (10 Morgan 2025 05:50), Max: 101 (09 Jan 2025 22:15)  HR: 77 (10 Morgan 2025 05:50) (66 - 85)  BP: 135/74 (10 Morgan 2025 05:50) (116/56 - 164/73)  BP(mean): 93 (09 Jan 2025 20:00) (80 - 105)  RR: 18 (10 Morgan 2025 05:50) (18 - 27)  SpO2: 94% (10 Morgan 2025 05:50) (92% - 96%)    Parameters below as of 10 Morgan 2025 05:50  Patient On (Oxygen Delivery Method): room air        I&O's Summary    09 Jan 2025 07:01  -  10 Morgan 2025 07:00  --------------------------------------------------------  IN: 700 mL / OUT: 750 mL / NET: -50 mL        ON EXAM:    General: NAD, awake and alert, oriented x 3  HEENT: Mucous membranes are moist, anicteric  Lungs: Non-labored, breath sounds are clear bilaterally, No wheezing, rales or rhonchi  Cardiovascular: Regular, S1 and S2, 2/6 SM   Gastrointestinal: Bowel Sounds present, soft, nontender.   Lymph: No peripheral edema. No lymphadenopathy.  Skin: No rashes or ulcers  Psych:  Mood & affect appropriate    LABS: All Labs Reviewed:                        8.0    95.33 )-----------( 42       ( 10 Morgan 2025 00:05 )             24.6                         8.9    87.92 )-----------( 55       ( 09 Jan 2025 13:13 )             26.8                         9.0    85.91 )-----------( 13       ( 09 Jan 2025 00:50 )             28.0     09 Jan 2025 00:50    136    |  100    |  17     ----------------------------<  207    4.5     |  26     |  1.05   08 Jan 2025 05:51    140    |  101    |  16     ----------------------------<  194    3.0     |  26     |  1.10   07 Jan 2025 23:51    138    |  100    |  17     ----------------------------<  163    3.0     |  23     |  1.04     Ca    9.7        09 Jan 2025 00:50  Ca    9.6        08 Jan 2025 05:51  Ca    9.6        07 Jan 2025 23:51  Phos  2.8       09 Jan 2025 00:50  Phos  2.7       08 Jan 2025 05:51  Phos  2.0       07 Jan 2025 23:51  Mg     1.7       09 Jan 2025 00:50  Mg     1.4       08 Jan 2025 05:51  Mg     1.4       07 Jan 2025 23:51    TPro  6.6    /  Alb  3.7    /  TBili  1.4    /  DBili  x      /  AST  24     /  ALT  10     /  AlkPhos  62     09 Jan 2025 00:50  TPro  6.7    /  Alb  3.7    /  TBili  0.7    /  DBili  x      /  AST  24     /  ALT  14     /  AlkPhos  67     08 Jan 2025 05:51  TPro  8.2    /  Alb  3.9    /  TBili  0.6    /  DBili  x      /  AST  37     /  ALT  18     /  AlkPhos  100    07 Jan 2025 23:51    PT/INR - ( 09 Jan 2025 00:50 )   PT: 14.5 sec;   INR: 1.27 ratio         PTT - ( 09 Jan 2025 00:50 )  PTT:27.3 sec      Blood Culture: Organism --  Gram Stain Blood -- Gram Stain --  Specimen Source .Blood BLOOD  Culture-Blood --    Organism --  Gram Stain Blood -- Gram Stain --  Specimen Source .Blood BLOOD  Culture-Blood --    Organism --  Gram Stain Blood -- Gram Stain --  Specimen Source .Blood BLOOD  Culture-Blood --    Organism --  Gram Stain Blood -- Gram Stain --  Specimen Source .Blood BLOOD  Culture-Blood --

## 2025-01-10 NOTE — DISCHARGE NOTE PROVIDER - HOSPITAL COURSE
74F with pmhx left side breast cancer (s/p lumpectomy, RT and chemo), colon cancer (s/p resection), CAD (s/p CABG x2), DM2, gout, HTN, HLD who presented initially to [facility] with lower back pain and transferred to [facility] for hyperleukocytosis with reported blasts concerning for newly diagnosed acute myeloid leukemia. Patient presenting with .33 and blast 69% noted on manual differential.   Bone marrow biopsy from 1/8 shows ___________  Patient became febrile on 1/9. Blood cx show ________. CXR shows Nonspecific cluster of nodular opacities in the right lateral upper lung field not seen on prior. Findings may be external to the patient. If clinically concerned obtain repeat follow-up radiograph. Repeat CXR on 1/10 shows ______.    Treatment with ________ started on _______

## 2025-01-10 NOTE — PROGRESS NOTE ADULT - PROBLEM SELECTOR PLAN 4
Holding pharmacological dvt ppx due to thrombocytopenia  OOB/encourage ambulation  miralax QD, senna @HS

## 2025-01-10 NOTE — PROVIDER CONTACT NOTE (OTHER) - ACTION/TREATMENT ORDERED:
PO tylenol to be ordered, Blood cultures, UA, urine culture, and CXR collected. ABX started after BC drawn PO tylenol to be ordered, Blood cultures, UA, urine culture, and CXR. ABX to be started after blood cultures x2 drawn

## 2025-01-10 NOTE — PROGRESS NOTE ADULT - SUBJECTIVE AND OBJECTIVE BOX
Diagnosis: AML     Protocol/Chemo Regimen: TBD  Day: N/A     Pt endorsed: general fatigue but feels ok    Review of Systems: Denies fever, chills, nausea, vomiting, abdominal pain, diarrhea, constipation, CP, palpitations, SOB, or LE edema.     Pain scale: Denies                                      Location: N/A    Diet: DASH/TLC, CC/no snacks    Allergies: atenolol (Unknown)  penicillin (Hives)  sulfa drugs (Hives)  shellfish (Hives)    ANTIMICROBIALS  cefepime   IVPB      cefepime   IVPB 2000 milliGRAM(s) IV Intermittent every 8 hours    HEME/ONC MEDICATIONS  hydroxyurea 2000 milliGRAM(s) Oral three times a day    STANDING MEDICATIONS  allopurinol 300 milliGRAM(s) Oral daily  atorvastatin 80 milliGRAM(s) Oral at bedtime  chlorhexidine 2% Cloths 1 Application(s) Topical <User Schedule>  chlorhexidine 4% Liquid 1 Application(s) Topical <User Schedule>  insulin lispro (ADMELOG) corrective regimen sliding scale   SubCutaneous Before meals and at bedtime  metoprolol tartrate 50 milliGRAM(s) Oral two times a day  polyethylene glycol 3350 17 Gram(s) Oral daily  senna 2 Tablet(s) Oral at bedtime    PRN MEDICATIONS  sodium chloride 0.9% lock flush 10 milliLiter(s) IV Push every 1 hour PRN    Vital Signs Last 24 Hrs  T(C): 36.9 (10 Morgan 2025 09:51), Max: 38.3 (09 Jan 2025 22:15)  T(F): 98.4 (10 Morgan 2025 09:51), Max: 101 (09 Jan 2025 22:15)  HR: 75 (10 Morgan 2025 09:51) (72 - 85)  BP: 133/70 (10 Morgan 2025 09:51) (133/70 - 155/74)  BP(mean): 93 (09 Jan 2025 20:00) (91 - 93)  RR: 18 (10 Morgan 2025 09:51) (18 - 25)  SpO2: 96% (10 Morgan 2025 09:51) (92% - 96%)    Parameters below as of 10 Morgan 2025 09:51  Patient On (Oxygen Delivery Method): room air    PHYSICAL EXAM  General: adult in NAD  HEENT: clear oropharynx, anicteric sclera  CV: normal S1/S2 RRR  Lungs: positive air movement b/l ant lungs,clear to auscultation, no wheezes, no rales  Abdomen: soft non-tender non-distended, no hepatosplenomegaly  Ext: no edema  Skin: no rashes and no petechiae  Neuro: alert and oriented X 4  PIV CDI    LABS:    Recent Cultures:     Culture - Blood (01.07.25 @ 23:51)    Specimen Source: .Blood BLOOD   Culture Results:   No growth at 48 Hours    Culture - Blood (01.07.25 @ 23:50)    Specimen Source: .Blood BLOOD   Culture Results:   No growth at 48 Hours    Urinalysis with Rflx Culture (01.07.25 @ 14:38)    Urine Appearance: Clear   Color: Yellow   Specific Gravity: 1.032   pH Urine: 6.5   Protein, Urine: 30 mg/dL   Glucose Qualitative, Urine: Negative mg/dL   Ketone - Urine: Negative mg/dL   Blood, Urine: Moderate   Bilirubin: Negative   Urobilinogen: 1.0 mg/dL   Leukocyte Esterase Concentration: Negative   Nitrite: Negative                          8.2    99.32 )-----------( 36       ( 10 Morgan 2025 11:37 )             25.2     Mean Cell Volume : 93.0 fl  Mean Cell Hemoglobin : 30.3 pg  Mean Cell Hemoglobin Concentration : 32.5 g/dL  Auto Neutrophil # : 3.97 K/uL  Auto Lymphocyte # : 9.93 K/uL  Auto Monocyte # : 0.00 K/uL  Auto Eosinophil # : 0.00 K/uL  Auto Basophil # : 0.99 K/uL  Auto Neutrophil % : 4.0 %  Auto Lymphocyte % : 10.0 %  Auto Monocyte % : 0.0 %  Auto Eosinophil % : 0.0 %  Auto Basophil % : 1.0 %    01-10    137  |  95[L]  |  19  ----------------------------<  169[H]  3.4[L]   |  26  |  0.91    Ca    9.2      10 Morgan 2025 11:37  Phos  2.7     01-10  Mg     1.8     01-10    TPro  7.4  /  Alb  4.0  /  TBili  0.9  /  DBili  x   /  AST  30  /  ALT  15  /  AlkPhos  64  01-10    PT/INR - ( 10 Morgan 2025 11:37 )   PT: 13.0 sec;   INR: 1.14 ratio       PTT - ( 10 Morgan 2025 11:37 )  PTT:25.4 sec      Uric Acid 0.7    RADIOLOGY & ADDITIONAL STUDIES:    < from: Xray Chest 1 View- PORTABLE-Urgent (Xray Chest 1 View- PORTABLE-Urgent .) (01.09.25 @ 23:45) >  IMPRESSION:  Nonspecific cluster of nodular opacities in the right lateral upper lung   field not seen on prior. Findings may be external to the patient. If   clinically concerned obtain repeat follow-up radiograph.

## 2025-01-10 NOTE — ADVANCED PRACTICE NURSE CONSULT - ASSESSMENT
Screening  Arrived on the unit to find patient in bed alert and oriented times four. During round patient was seen by Dr. Goldberg and he explain the protocol, A multicenter, open-label, randomized, phase 2 study of venetoclax and azacitidine plus cusatuzumab versus venetoclax and azacitidine alone in newly diagnosed AML patients who are not candidates for intensive therapy. Patient verbalized interest in been part of the study. Patient was visited by the research team and explain the protocol ICF in detail to the patient whom verbalized understanding. Patient requested at this time she  would like for her family to review the study and she would let the team know on Monday 1/13/2025.

## 2025-01-10 NOTE — PROGRESS NOTE ADULT - PROBLEM SELECTOR PLAN 1
1/8/25 TM interpretation: Abnormal myeloblasts (93%), consistent w/ acute myeloid leukemia. FISH for PML-SOULEYMANE is negative. The overall findings are consistent with AML.    Monitor CBC w dif, CMP, TLS labs - replete electrolytes and transfuse blood products PRN  Consider rasburicase 3mg IV x 1 if uric acid >8, and 6mg IV x 1 if uric acid >12  IVF, daily weights, strict Is/Os, diuresis PRN  Mouth care, Antiemetics  Continue allopurinol 300mg QD  S/p leukophoresis x2 on 1/8 and 1/9 1/8 BM bx done. fu flow  1/10 Hydrea increased to 2000mg TID, hypokalemia- repleted 1/8/25 TM interpretation: Abnormal myeloblasts (93%), consistent w/ acute myeloid leukemia. FISH for PML-SOULEYMANE is negative. The overall findings are consistent with AML.    Monitor CBC w dif, CMP, TLS labs - replete electrolytes and transfuse blood products PRN  Consider rasburicase 3mg IV x 1 if uric acid >8, and 6mg IV x 1 if uric acid >12  IVF, daily weights, strict Is/Os, diuresis PRN  Mouth care, Antiemetics  Continue allopurinol 300mg QD  S/p leukophoresis x2 on 1/8 and 1/9 1/8 BM bx done. fu flow  1/10 Hydrea increased to 2000mg TID, hypokalemia- repleted. research RN meeting w/ pt to discuss study

## 2025-01-10 NOTE — PROGRESS NOTE ADULT - PROBLEM SELECTOR PLAN 2
Not neutropenic, last fever 1/9 1/7 blood cx NGTD  1/7 CT CAP No evidence of acute abnormality in the pelvis.  1/9 patient given one dose vanc and started on cefepime 2g Q8  1/9 fu blood cx, CXR: Nonspecific cluster of nodular opacities in the right lateral upper lung field not seen on prior. Findings may be external to the patient. If clinically concerned obtain repeat follow-up radiograph.  1/10 fu repeat CXR

## 2025-01-10 NOTE — PROVIDER CONTACT NOTE (OTHER) - RECOMMENDATIONS
PO tylenol and Blood cultures, UA, urine culture, and CXR collected PO tylenol and Blood cultures, UA, urine culture, and CXR

## 2025-01-10 NOTE — PROGRESS NOTE ADULT - ASSESSMENT
74F hx presented initially to [facility] with lower back pain and transferred to [facility] for hyperleukocytosis with reported blasts concerning for newly diagnosed acute myeloid leukemia.    PMHx: left breast cancer s/p lumpectomy, RT and chemo, colon cancer s/p resection, CAD s/p CABG x2 in 2010, T2DM not on insulin, gout, HTN, HLD     #Acute Myeloid Leukemia  - Patient presenting with .33 and blast 69% noted on manual differential.   - Peripheral smear reviewed: RBCs are normocytic, hypochromic and have slight anisocytosis. Occasional target cells are seen. Slight polychromasia is noted. No basophilic stippling is seen. Occasional nucleated RBCs are seen. No Boss-Jolly bodies or siderotic granules are seen. WBCs appear markedly increased in number with almost entirely blast-like cells without Bran rods. Platelets appear decreased in number. Platelets appear normal morphologically with small forms.  - Based on her prior two peripheral blood flow cytometry results in 2019 and 2020, have a slightly higher suspicion for a myelomonocytic leukemia.  - Iron panel c/w ACD. pending G6PD HIV and hepatitis panel.   - S/p 2 sessions of leukapheresis: 1/8/24 - 1/9/24, tolerated well; repeat wbc today 87k  - TTE 1/6/25: EF 65%   - TM Interpretation 1/8/25:  Abnormal myeloblasts (93%), consisent with acute myeloid leukemia. See note and interpretation. FISH for PML-SOULEYMANE is negative. The overall findings are consistent with AML.      Recommendations:   - Continue allopurinol 300 mg qd as initial uric acid 9.7  - Continue with hydroxyurea 2000 mg BID for cytoreduction.  - Will hold of on further leukapheresis at this time.   - S/p bedside BMBx; dry tap but able to obtain marrow; expediated results   - Please check CBC with Diff, coags, fibrinogen Clauss, d-dimer and TLS labs (uric acid, LDH, Mg, Phos) DAILY  - Please give cryo for fibrinogen <150   - If uric acid > 8, give 3 mg IV Rasburicase x1; if >12 give 6 mg IV x1. Initial uric acid 9.7, ordered rasburicase 3 mg IV STAT after confirmed G6PD level was sent. Testing for uric acid must be sent on ice for 5 days after rasburicase administration to avoid falsely low uric acid levels.   - Transfuse for Hgb < 7 and platelets < 10k, < 15k if febrile and < 50k if non-CNS bleeding or requiring procedures  - Plan to transfer to [address] [place] [address] when bed available   74F with pmhx left side breast cancer (s/p lumpectomy, RT and chemo), colon cancer (s/p resection), CAD (s/p CABG x2), DM2, gout, HTN, HLD who presented initially to [facility] with lower back pain and transferred to [facility] for hyperleukocytosis with reported blasts concerning for newly diagnosed acute myeloid leukemia. Patient presenting with .33 and blast 69% noted on manual differential.

## 2025-01-10 NOTE — PROGRESS NOTE ADULT - ASSESSMENT
Ellie is a 75 y/o F PMH breast cancer in remission('99, '23), colon cancer '01 s/p resection in remission, HTN, HLD, DM2, CAD status post CABG @ [facility] &, vertigo.    Presents to [place] ED 1/7 c/o generalized back pain x 3 months with some abdominal discomfort over the past 1 week. She has been feeling some profound weakness and fatigue and has been unable to carry on ADLs.  Over the past 3 days or so she has had some dyspnea on exertion. Patient is having some left upper leg discomfort.      - mild troponin elevation, though no suggestion of acs (Trop I 143--> Trop T 57)  - ekg with sr, rbbb, nsst abn  - TTE with normal BiV function, no WMA noted.   - without cp or dyspnea during my exam  - found to have significantly elevated WBC at >280k, now markedly decreased after leukapheresis   - troponin elevation 2/2 demand ischemia in the setting of new leukemia, likely AML. S/p BMB as well.   - Heme onc following. S/p RIJ catheter placed s/p therapeutic leukapheresis   - bnp elevated, though no does not appear volume overloaded  - start amlodipine if needed for bp control    #CAD s/p CABG:  - On home Statin  - Remains on BB  - ASA on hold given significant thrombocytopenia

## 2025-01-10 NOTE — DISCHARGE NOTE PROVIDER - NSDCMRMEDTOKEN_GEN_ALL_CORE_FT
allopurinol 100 mg oral tablet: orally once a day  aspirin 81 mg oral tablet: 1 tab(s) orally once a day  ezetimibe 10 mg oral tablet: 1 tab(s) orally once a day (at bedtime)  lisinopril-hydrochlorothiazide 20 mg-25 mg oral tablet: 1 tab(s) orally once a day  Metoprolol Tartrate 50 mg oral tablet: 1 tab(s) orally 2 times a day  Multiple Vitamins oral tablet: 1 tab(s) orally once a day LD 1/23/2020  pravastatin 80 mg oral tablet: 1 tab(s) orally once a day  Vitamin C: 1 tab(s) orally once a day  vitamin D: 1 tab(s) orally once a day

## 2025-01-11 NOTE — PROGRESS NOTE ADULT - NS ATTEND AMEND GEN_ALL_CORE FT
74F with new AML with hyperleukocytosis to 280k on presentation s/p leukapheresis x 2 and here for evaluation and treatment.     Pmhx: left side breast cancer (s/p lumpectomy, RT and chemo), colon cancer (s/p resection), CAD (s/p CABG x2), DM2, gout, HTN, HLD     Heme:  -Patient screened and eligible for Oncoverity trial of HMA/Venetoclax +/- cusatuzumab. Reviewing informed consent with family and will decide on study Monday.   -Flow cytometry of PB sample showed an abnormal myeloblast population (93% of cells) positive for dim CD45, CD38 (dim), CD13 (dim partial), CD33, CD11b (dim partial), MPO (dim partial), CD56, and negative for CD34, , HLA-DR, TdT, other myeloid-associated markers, , as well as B- and T-cell-associated markers.   -BMBx done 1/8, results PENDING.   -FLT3 ITD positive via PB.   -Continue with hydroxyurea 2 g TID at this time, WBC trending up.   -Continue with allopurinol, check TLS BID.   -R sided IJ catheter in place.     ID:  -Fever on 1/9, cultures PENDING.   -On Cefepime (1/9- ) as intermittently neutropenic and clearly functionally immunosuppressed.   -CXR (1/9) showed nonspecific cluster of nodular opacities in the right lateral upper lung, concern for possible pneumonia    Supportive care:  Anti-emetics   IVFs, strict Is/Os, daily weights, diuresis as needed.

## 2025-01-11 NOTE — PROGRESS NOTE ADULT - PROBLEM SELECTOR PLAN 2
Not neutropenic, last fever 1/9 1/7 blood cx NGTD  1/7 CT CAP No evidence of acute abnormality in the pelvis.  1/9 patient given one dose vanc and started on cefepime 2g Q8  1/9 fu blood cx, CXR: Nonspecific cluster of nodular opacities in the right lateral upper lung field not seen on prior. Findings may be external to the patient. If clinically concerned obtain repeat follow-up radiograph.  1/10 fu repeat CXR Not neutropenic, last fever 1/9 1/7 blood cx NGTD  1/7 CT CAP No evidence of acute abnormality in the pelvis.  1/9 patient given one dose vanc and started on cefepime 2g Q8  1/9 fu blood cx, CXR: Nonspecific cluster of nodular opacities in the right lateral upper lung field not seen on prior. Findings may be external to the patient. If clinically concerned obtain repeat follow-up radiograph.  1/10 CXR Nonspecific cluster of nodular opacities in the right lateral upper lung field not seen on prior.  1/11 CT Chest. fu results

## 2025-01-11 NOTE — PROGRESS NOTE ADULT - SUBJECTIVE AND OBJECTIVE BOX
Diagnosis: AML     Protocol/Chemo Regimen: TBD    Day: N/A     Pt endorsed: general fatigue but feels ok    Review of Systems: Denies fever, chills, nausea, vomiting, abdominal pain, diarrhea, constipation, CP, palpitations, SOB, or LE edema.     Pain scale: Denies                                      Location: N/A    Diet: DASH/TLC, CC/no snacks      ANTIMICROBIALS  cefepime   IVPB 2000 milliGRAM(s) IV Intermittent every 8 hours    HEME/ONC MEDICATIONS  hydroxyurea 2000 milliGRAM(s) Oral three times a day      STANDING MEDICATIONS  allopurinol 300 milliGRAM(s) Oral daily  atorvastatin 80 milliGRAM(s) Oral at bedtime  chlorhexidine 2% Cloths 1 Application(s) Topical <User Schedule>  chlorhexidine 4% Liquid 1 Application(s) Topical <User Schedule>  insulin lispro (ADMELOG) corrective regimen sliding scale   SubCutaneous Before meals and at bedtime  metoprolol tartrate 50 milliGRAM(s) Oral two times a day  polyethylene glycol 3350 17 Gram(s) Oral daily  senna 2 Tablet(s) Oral at bedtime      PRN MEDICATIONS  sodium chloride 0.9% lock flush 10 milliLiter(s) IV Push every 1 hour PRN      Vital Signs Last 24 Hrs  T(C): 37.1 (11 Jan 2025 05:25), Max: 37.8 (10 Morgan 2025 17:58)  T(F): 98.7 (11 Jan 2025 05:25), Max: 100 (10 Morgan 2025 17:58)  HR: 74 (11 Jan 2025 05:25) (71 - 85)  BP: 153/74 (11 Jan 2025 05:25) (142/71 - 177/66)  BP(mean): --  RR: 18 (11 Jan 2025 05:25) (17 - 18)  SpO2: 95% (11 Jan 2025 05:25) (94% - 96%)    Parameters below as of 11 Jan 2025 05:25  Patient On (Oxygen Delivery Method): room air    PHYSICAL EXAM  General: adult in NAD  HEENT: clear oropharynx, anicteric sclera  CV: normal S1/S2 RRR  Lungs: positive air movement b/l ant lungs,clear to auscultation, no wheezes, no rales  Abdomen: soft non-tender non-distended, no hepatosplenomegaly  Ext: no edema  Skin: no rashes and no petechiae  Neuro: alert and oriented X 4  PIV CDI    LABS:    Recent Cultures:     Culture - Urine (01.10.25 @ 09:48)    Specimen Source: Clean Catch Clean Catch (Midstream)   Culture Results:   No growth    Culture - Blood (01.10.25 @ 00:06)    Specimen Source: .Blood BLOOD   Culture Results:   No growth at 24 hours      Culture - Blood (01.10.25 @ 00:05)    Specimen Source: .Blood BLOOD   Culture Results:   No growth at 24 hours                            8.2    123.87 )-----------( 32       ( 11 Jan 2025 10:49 )             24.9         Mean Cell Volume : 94.7 fl  Mean Cell Hemoglobin : 31.2 pg  Mean Cell Hemoglobin Concentration : 32.9 g/dL  Auto Neutrophil # : 3.47 K/uL  Auto Lymphocyte # : 9.41 K/uL  Auto Monocyte # : 0.87 K/uL  Auto Eosinophil # : 0.00 K/uL  Auto Basophil # : 0.00 K/uL  Auto Neutrophil % : 2.8 %  Auto Lymphocyte % : 7.6 %  Auto Monocyte % : 0.7 %  Auto Eosinophil % : 0.0 %  Auto Basophil % : 0.0 %    01-11    137  |  98  |  21  ----------------------------<  162[H]  3.6   |  23  |  1.03    Ca    9.7      11 Jan 2025 10:49  Phos  2.5     01-11  Mg     1.9     01-11    TPro  8.1  /  Alb  4.1  /  TBili  0.8  /  DBili  x   /  AST  36  /  ALT  19  /  AlkPhos  71  01-11    PT/INR - ( 11 Jan 2025 10:49 )   PT: 13.1 sec;   INR: 1.14 ratio       PTT - ( 11 Jan 2025 10:49 )  PTT:25.4 sec      Uric Acid 1.4    RADIOLOGY & ADDITIONAL STUDIES:    < from: Xray Chest 1 View- PORTABLE-Urgent (Xray Chest 1 View- PORTABLE-Urgent .) (01.09.25 @ 23:45) >  IMPRESSION:  Nonspecific cluster of nodular opacities in the right lateral upper lung   field not seen on prior. Findings may be external to the patient. If   clinically concerned obtain repeat follow-up radiograph.

## 2025-01-11 NOTE — PROGRESS NOTE ADULT - SUBJECTIVE AND OBJECTIVE BOX
MR#[id number]  PATIENT NAME:YUKI PADILLA    DATE OF SERVICE: 01-11-25 @ 08:20  Patient was seen and examined by Juanito Awan MD on    01-11-25 @ 08:20 .  Interim events noted.Consultant notes ,Labs,Telemetry reviewed by me       Covering for [facility] -Palma Moore, Blessing, Jewel Morales Savella, Cohen  Office Number: [phone]           HOSPITAL COURSE: HPI:  73 y/o F with PMHx breast cancer s/p lumpectomy, RT and chemo in 1999 w/ local recurrence in 2023 s/p resection; colon ca s/p resection in 2021, CAD s/p CABG x2 in 2010, HTN, HLD and T2DM who initially presented to [place] for lethargy x 1 month w/ exertional SOB, acute on chronic low back pain and new b/l leg pain. She was found to have  w/ 69% blasts, transferred to [facility] for hematology evaluation of new acute leukemia and blast crisis.     Brief ED course:  VS: T 98.2, HR 86, /79, RR 16 spO2 94% on RA.  Labs: .33 1/ 69% blasts, Hgb 9.5, plt 43. K 2.6, trop 143.6, pro-BNP 1015  Imaging:CT lumbar spine showing L5-S1 diffuse disc bulging contributes to high-grade bilateral foraminal compromise without significant central canal compromise    Pt seen and evaluated by heme/onc in the ED, given allopurinol 300mg x1, rasburicase 3mg IV x1, hydroxyurea 200mg PO x1, 10 mEq K repletion. Pt was accepted directly to MICU for emergent leukapheresis.  (07 Jan 2025 21:38)     Transferred to [facility] for hyperleukocytosis with reported blasts concerning for newly diagnosed acute myeloid leukemia. Patient presenting with .33 and blast 69% noted on manual differential.     S/p leukophoresis x2 on 1/8 and 1/9    INTERIM EVENTS:Patient seen at bedside ,interim events noted.  Patient resting comfortably in bed in NAD.  Laying flat with no respiratory distress.  No complaints of chest pain, dyspnea, palpitations, PND, or orthopnea.  febrile overnight    PMH -reviewed admission note, no change since admission  HEART FAILURE: Acute[ ]Chronic[ ] Systolic[ ] Diastolic[ ] Combined Systolic and Diastolic[ ]  CAD[ ] CABG[ ] PCI[ ]  DEVICES[ ] PPM[ ] ICD[ ] ILR[ ]  ATRIAL FIBRILLATION[ ] Paroxysmal[ ] Permanent[ ] CHADS2-[  ]  GODFREY[ ] CKD1[ ] CKD2[ ] CKD3[ ] CKD4[ ] ESRD[ ]  COPD[ ] HTN[ ]   DM[ ] Type1[ ] Type 2[ ]   CVA[ ] Paresis[ ]    AMBULATION: Assisted[ ] Cane/walker[ ] Independent[ ]    MEDICATIONS  (STANDING):  allopurinol 300 milliGRAM(s) Oral daily  atorvastatin 80 milliGRAM(s) Oral at bedtime  cefepime   IVPB      cefepime   IVPB 2000 milliGRAM(s) IV Intermittent every 8 hours  chlorhexidine 2% Cloths 1 Application(s) Topical <User Schedule>  chlorhexidine 4% Liquid 1 Application(s) Topical <User Schedule>  hydroxyurea 2000 milliGRAM(s) Oral three times a day  insulin lispro (ADMELOG) corrective regimen sliding scale   SubCutaneous Before meals and at bedtime  metoprolol tartrate 50 milliGRAM(s) Oral two times a day  polyethylene glycol 3350 17 Gram(s) Oral daily  senna 2 Tablet(s) Oral at bedtime    MEDICATIONS  (PRN):  sodium chloride 0.9% lock flush 10 milliLiter(s) IV Push every 1 hour PRN Pre/post blood products, medications, blood draw, and to maintain line patency            REVIEW OF SYSTEMS:  Constitutional: [ ] fever, [ ]weight loss,  [x ]fatigue [ ]weight gain  Eyes: [ ] visual changes  Respiratory: [ ]shortness of breath;  [ ] cough, [ ]wheezing, [ ]chills, [ ]hemoptysis  Cardiovascular: [ ] chest pain, [ ]palpitations, [ ]dizziness,  [ ]leg swelling[ ]orthopnea[ ]PND  Gastrointestinal: [ ] abdominal pain, [ ]nausea, [ ]vomiting,  [ ]diarrhea [ ]Constipation [ ]Melena  Genitourinary: [ ] dysuria, [ ] hematuria [ ]Castillo  Neurologic: [ ] headaches [ ] tremors[ ]weakness [ ]Paralysis Right[ ] Left[ ]  Skin: [ ] itching, [ ]burning, [ ] rashes  Endocrine: [ ] heat or cold intolerance  Musculoskeletal: [ ] joint pain or swelling; [ ] muscle, back, or extremity pain  Psychiatric: [ ] depression, [ ]anxiety, [ ]mood swings, or [ ]difficulty sleeping  Hematologic: [ ] easy bruising, [ ] bleeding gums    [ ] All remaining systems negative except as per above.   [ ]Unable to obtain.  [x] No change in ROS since admission      Vital Signs Last 24 Hrs  T(C): 37.1 (11 Jan 2025 05:25), Max: 37.8 (10 Morgan 2025 17:58)  T(F): 98.7 (11 Jan 2025 05:25), Max: 100 (10 Morgan 2025 17:58)  HR: 74 (11 Jan 2025 05:25) (71 - 85)  BP: 153/74 (11 Jan 2025 05:25) (133/70 - 177/66)  RR: 18 (11 Jan 2025 05:25) (17 - 18)  SpO2: 95% (11 Jan 2025 05:25) (94% - 96%)    Parameters below as of 11 Jan 2025 05:25  Patient On (Oxygen Delivery Method): room air      I&O's Summary    10 Morgan 2025 07:01  -  11 Jan 2025 07:00  --------------------------------------------------------  IN: 1772 mL / OUT: 1860 mL / NET: -88 mL        PHYSICAL EXAM:  General: No acute distress BMI-32.5  HEENT: EOMI, PERRL  Neck: Supple, [ ] JVD  Lungs: Equal air entry bilaterally; [ ] rales [ ] wheezing [ ] rhonchi  Heart: Regular rate and rhythm; [x ] murmur   2/6 [ x] systolic [ ] diastolic [ ] radiation[ ] rubs [ ]  gallops  Abdomen: Nontender, bowel sounds present  Extremities: No clubbing, cyanosis, [ ] edema [ ]Pulses  equal and intact  Nervous system:  Alert & Oriented X3, no focal deficits  Psychiatric: Normal affect  Skin: No rashes or lesions    LABS:  01-10    137  |  95[L]  |  19  ----------------------------<  169[H]  3.4[L]   |  26  |  0.91    Ca    9.2      10 Morgan 2025 11:37  Phos  2.7     01-10  Mg     1.8     01-10    TPro  7.4  /  Alb  4.0  /  TBili  0.9  /  DBili  x   /  AST  30  /  ALT  15  /  AlkPhos  64  01-10    Creatinine Trend: 0.91<--, 1.05<--, 1.10<--, 1.04<--, 1.00<--, 1.20<--                        8.2    99.32 )-----------( 36       ( 10 Morgan 2025 11:37 )             25.2     PT/INR - ( 10 Morgan 2025 11:37 )   PT: 13.0 sec;   INR: 1.14 ratio         PTT - ( 10 Morgan 2025 11:37 )  PTT:25.4 sec      Uric Acid: 0.7 <--4.4 <--9.7mg/dL    TTE W or WO Ultrasound Enhancing Agent (01.08.25 @ 11:11) >  CONCLUSIONS:      1. Left ventricular cavity is normal in size. Left ventricular wall thickness is normal. Left ventricular systolic function is normal with an ejection fraction visually estimated at 60 to 65 %. There are no regional wall motion abnormalities seen.      Xray Chest 1 View- PORTABLE-Urgent (Xray Chest 1 View- PORTABLE-Urgent .) (01.09.25 @ 23:45) >  FINDINGS:    The heart is similar in size. Median sternotomy. Mediastinal surgical  clips.  No focal consolidation. Nonspecific cluster of nodular opacities in the  right lateral upper lung field not seen on prior. Findings may be  external to the patient.  There is no pneumothorax or pleural effusion.  No acute osseous abnormalities.    IMPRESSION:  Nonspecific cluster of nodular opacities in the right lateral upper lung  field not seen on prior. Findings may be external to the patient. If  clinically concerned obtain repeat follow-up radiograph.        : 12 Lead ECG (01.07.25 @ 18:18) >  SINUS TACHYCARDIAWITH BLOCKED PREMATURE ATRIAL COMPLEXES  RIGHT BUNDLE BRANCH BLOCK  ABNORMAL ECG

## 2025-01-11 NOTE — ADVANCED PRACTICE NURSE CONSULT - REASON FOR CONSULT
Vascular Access Team    Evaluation for: Bedside DL SOLO PICC placement  Requested by name: Corin Lui (11-Jan-2025 14:18)    Indication for PICC: IV Chemotherapy      Allergies- atenolol, PCN, sulfa drugs   Platelets(>20): 32  INR(<3): 1.14  eGFR(>40): 56  Blood cultures sent: 1/7/25  Blood culture negative in 48hrs: yes  Anticoagulants: no   Arms DVT: no   Mastectomy: (PSH) S/P Breast Lumpectomy  Fistula: no   PPM/Defib: no   IR or Nephrology or ID clearance needed- no    Consent obtained- pending     Plan: Bedside picc order evaluated. Please obtain PICC placement consent and then call [phone] for the VAT RN to place the bedside picc.   Vascular Access Team    Evaluation for: Bedside DL SOLO PICC placement  Requested by name: Corin Lui (11-Jan-2025 14:18)    Indication for PICC: IV Chemotherapy      Allergies- atenolol, PCN, sulfa drugs   Platelets(>20): 32  INR(<3): 1.14  eGFR(>40): 56  Blood cultures sent: 1/10/25  Blood culture negative in 48hrs: No, pending B/C negative at 48 hours   Anticoagulants: no   Arms DVT: no   Mastectomy: ([facility]) S/P Breast Lumpectomy  Fistula: no   PPM/Defib: no   IR or Nephrology or ID clearance needed- no    Consent obtained-Yes  Pending- negative blood cultures at 48 hours     Plan: Bedside picc order evaluated. Please obtain PICC placement consent and then call [phone] for the VAT RN to place the bedside picc.

## 2025-01-11 NOTE — PROGRESS NOTE ADULT - ASSESSMENT
74F with pmhx left side breast cancer (s/p lumpectomy, RT and chemo), colon cancer (s/p resection), CAD (s/p CABG x2), DM2, gout, HTN, HLD who presented initially to [facility] with lower back pain and transferred to [facility] for hyperleukocytosis with reported blasts concerning for newly diagnosed acute myeloid leukemia. Patient presenting with .33 and blast 69% noted on manual differential.

## 2025-01-11 NOTE — PROGRESS NOTE ADULT - PROBLEM SELECTOR PLAN 1
1/8/25 TM interpretation: Abnormal myeloblasts (93%), consistent w/ acute myeloid leukemia. FISH for PML-SOULEYMANE is negative. The overall findings are consistent with AML.    Monitor CBC w dif, CMP, TLS labs - replete electrolytes and transfuse blood products PRN  Consider rasburicase 3mg IV x 1 if uric acid >8, and 6mg IV x 1 if uric acid >12  IVF, daily weights, strict Is/Os, diuresis PRN  Mouth care, Antiemetics  Continue allopurinol 300mg QD  S/p leukophoresis x2 on 1/8 and 1/9 1/8 BM bx done. fu flow  1/10 Hydrea increased to 2000mg TID, hypokalemia- repleted. research RN meeting w/ pt to discuss study 1/8/25 TM interpretation: Abnormal myeloblasts (93%), consistent w/ acute myeloid leukemia. FISH for PML-SOULEYMANE is negative. The overall findings are consistent with AML.    Monitor CBC w dif, CMP, TLS labs - replete electrolytes and transfuse blood products PRN  Consider rasburicase 3mg IV x 1 if uric acid >8, and 6mg IV x 1 if uric acid >12  IVF, daily weights, strict Is/Os, diuresis PRN  Mouth care, Antiemetics  Continue allopurinol 300mg QD  S/p leukophoresis x2 on 1/8 and 1/9 1/8 BM bx done. fu flow  1/10 Hydrea increased to 2000mg TID, Research RN meeting w/ pt to discuss study.  1/11 PICC ordered. WBC increasing, monitor on hydrea TID

## 2025-01-11 NOTE — PROGRESS NOTE ADULT - ASSESSMENT
Ellie is a 73 y/o F PMH breast cancer in remission('99, '23), colon cancer '01 s/p resection in remission, HTN, HLD, DM2, CAD status post CABG @ [facility] &, vertigo.    Presents to [place] ED 1/7 c/o generalized back pain x 3 months with some abdominal discomfort over the past 1 week. She has been feeling some profound weakness and fatigue and has been unable to carry on ADLs.  Over the past 3 days or so she has had some dyspnea on exertion. Patient is having some left upper leg discomfort.      - mild troponin elevation, though no suggestion of acs (Trop I 143--> Trop T 57)  - ekg with sr, rbbb, nsst abn  - TTE with normal BiV function, no WMA noted.   - without cp or dyspnea during my exam      # AML  - found to have significantly elevated WBC at >280k, now markedly decreased after leukapheresis   - troponin elevation 2/2 demand ischemia in the setting of new leukemia, likely AML. S/p BMB as well.   - Heme onc following. S/p RIJ catheter placed s/p therapeutic leukapheresis   - bnp elevated, though no does not appear volume overloaded  - start amlodipine if needed for bp control    #CAD s/p CABG:  -Chronic stable ischemic heart disease  - On home Statin  - Remains on BB  - ASA on hold given significant thrombocytopenia

## 2025-01-12 NOTE — PROGRESS NOTE ADULT - SUBJECTIVE AND OBJECTIVE BOX
MR#[id number]  PATIENT NAME:YUKI PADILLA    DATE OF SERVICE: 01-12-25 @ 09:43  Patient was seen and examined by Juanito Awan MD on    01-12-25 @ 09:43 .  Interim events noted.Consultant notes ,Labs,Telemetry reviewed by me       Covering for [facility] -Palma Moore, Blessing, Jewel Morales Savella, Cohen  Office Number: [phone]         HOSPITAL COURSE: HPI:  75 y/o F with PMHx breast cancer s/p lumpectomy, RT and chemo in 1999 w/ local recurrence in 2023 s/p resection; colon ca s/p resection in 2021, CAD s/p CABG x2 in 2010, HTN, HLD and T2DM who initially presented to [place] for lethargy x 1 month w/ exertional SOB, acute on chronic low back pain and new b/l leg pain. She was found to have  w/ 69% blasts, transferred to [facility] for hematology evaluation of new acute leukemia and blast crisis.     Brief ED course:  VS: T 98.2, HR 86, /79, RR 16 spO2 94% on RA.  Labs: .33 1/ 69% blasts, Hgb 9.5, plt 43. K 2.6, trop 143.6, pro-BNP 1015  Imaging:CT lumbar spine showing L5-S1 diffuse disc bulging contributes to high-grade bilateral foraminal compromise without significant central canal compromise    Pt seen and evaluated by heme/onc in the ED, given allopurinol 300mg x1, rasburicase 3mg IV x1, hydroxyurea 200mg PO x1, 10 mEq K repletion. Pt was accepted directly to MICU for emergent leukapheresis.  (07 Jan 2025 21:38)   Transferred to [facility] for hyperleukocytosis with reported blasts concerning for newly diagnosed acute myeloid leukemia. Patient presenting with .33 and blast 69% noted on manual differential.     S/p leukophoresis x2 on 1/8 and 1/9    INTERIM EVENTS:Patient seen at bedside ,interim events noted.  Patient resting comfortably in bed in NAD.  Laying flat with no respiratory distress.  No complaints of chest pain, dyspnea, palpitations, PND, or orthopnea.  febrile overnight  For PICC line            MEDICATIONS  (STANDING):  allopurinol 300 milliGRAM(s) Oral daily  Biotene Dry Mouth Oral Rinse 15 milliLiter(s) Swish and Spit three times a day  cefepime   IVPB      cefepime   IVPB 2000 milliGRAM(s) IV Intermittent every 8 hours  chlorhexidine 2% Cloths 1 Application(s) Topical <User Schedule>  chlorhexidine 4% Liquid 1 Application(s) Topical <User Schedule>  hydroxyurea 2000 milliGRAM(s) Oral three times a day  insulin lispro (ADMELOG) corrective regimen sliding scale   SubCutaneous Before meals and at bedtime  metoprolol tartrate 50 milliGRAM(s) Oral two times a day  polyethylene glycol 3350 17 Gram(s) Oral daily  senna 2 Tablet(s) Oral at bedtime    MEDICATIONS  (PRN):  sodium chloride 0.9% lock flush 10 milliLiter(s) IV Push every 1 hour PRN Pre/post blood products, medications, blood draw, and to maintain line patency            REVIEW OF SYSTEMS:  Constitutional: [ ] fever, [ ]weight loss,  [ ]fatigue [ ]weight gain  Eyes: [ ] visual changes  Respiratory: [ ]shortness of breath;  [ ] cough, [ ]wheezing, [ ]chills, [ ]hemoptysis  Cardiovascular: [ ] chest pain, [ ]palpitations, [ ]dizziness,  [ ]leg swelling[ ]orthopnea[ ]PND  Gastrointestinal: [ ] abdominal pain, [ ]nausea, [ ]vomiting,  [ ]diarrhea [ ]Constipation [ ]Melena  Genitourinary: [ ] dysuria, [ ] hematuria [ ]Castillo  Neurologic: [ ] headaches [ ] tremors[ ]weakness [ ]Paralysis Right[ ] Left[ ]  Skin: [ ] itching, [ ]burning, [ ] rashes  Endocrine: [ ] heat or cold intolerance  Musculoskeletal: [ ] joint pain or swelling; [ ] muscle, back, or extremity pain  Psychiatric: [ ] depression, [ ]anxiety, [ ]mood swings, or [ ]difficulty sleeping  Hematologic: [ ] easy bruising, [ ] bleeding gums    [ ] All remaining systems negative except as per above.   [ ]Unable to obtain.  [x] No change in ROS since admission      Vital Signs Last 24 Hrs  T(C): 37.2 (12 Jan 2025 08:45), Max: 37.7 (12 Jan 2025 00:41)  T(F): 98.9 (12 Jan 2025 08:45), Max: 99.8 (12 Jan 2025 00:41)  HR: 82 (12 Jan 2025 08:45) (72 - 96)  BP: 185/73 (12 Jan 2025 08:45) (125/70 - 185/73)  RR: 20 (12 Jan 2025 08:45) (18 - 20)  SpO2: 97% (12 Jan 2025 08:45) (96% - 98%)    Parameters below as of 12 Jan 2025 08:45  Patient On (Oxygen Delivery Method): room air      I&O's Summary    11 Jan 2025 07:01  -  12 Jan 2025 07:00  --------------------------------------------------------  IN: 1432 mL / OUT: 1800 mL / NET: -368 mL        PHYSICAL EXAM:  General: No acute distress BMI-31  HEENT: EOMI, PERRL  Neck: Supple, [ ] JVD  Lungs: Equal air entry bilaterally; [ ] rales [ ] wheezing [ ] rhonchi  Heart: Regular rate and rhythm; [x ] murmur   2/6 [ x] systolic [ ] diastolic [ ] radiation[ ] rubs [ ]  gallops  Abdomen: Nontender, bowel sounds present  Extremities: No clubbing, cyanosis, [ ] edema [ ]Pulses  equal and intact  Nervous system:  Alert & Oriented X3, no focal deficits  Psychiatric: Normal affect  Skin: No rashes or lesions    LABS:  01-12    136  |  97  |  21  ----------------------------<  222[H]  3.7   |  22  |  0.92    Ca    9.7      12 Jan 2025 07:26  Phos  4.0     01-12  Mg     1.8     01-12    TPro  7.8  /  Alb  3.9  /  TBili  0.7  /  DBili  x   /  AST  40  /  ALT  20  /  AlkPhos  79  01-12    Creatinine Trend: 0.92<--, 1.05<--, 1.03<--, 0.91<--, 1.05<--, 1.10<--                        7.7    138.50 )-----------( 22       ( 12 Jan 2025 07:27 )             24.2     PT/INR - ( 12 Jan 2025 07:27 )   PT: 12.9 sec;   INR: 1.13 ratio         PTT - ( 12 Jan 2025 07:27 )  PTT:25.3 sec    TTE W or WO Ultrasound Enhancing Agent (01.08.25 @ 11:11) >  CONCLUSIONS:      1. Left ventricular cavity is normal in size. Left ventricular wall thickness is normal. Left ventricular systolic function is normal with an ejection fraction visually estimated at 60 to 65 %. There are no regional wall motion abnormalities seen.            Xray Chest 1 View- PORTABLE-Urgent (Xray Chest 1 View- PORTABLE-Urgent .) (01.09.25 @ 23:45) >  FINDINGS:    The heart is similar in size. Median sternotomy. Mediastinal surgical  clips.  No focal consolidation. Nonspecific cluster of nodular opacities in the  right lateral upper lung field not seen on prior. Findings may be  external to the patient.  There is no pneumothorax or pleural effusion.  No acute osseous abnormalities.    IMPRESSION:  Nonspecific cluster of nodular opacities in the right lateral upper lung  field not seen on prior. Findings may be external to the patient. If  clinically concerned obtain repeat follow-up radiograph.        : 12 Lead ECG (01.07.25 @ 18:18) >  SINUS TACHYCARDIAWITH BLOCKED PREMATURE ATRIAL COMPLEXES  RIGHT BUNDLE BRANCH BLOCK  ABNORMAL ECG

## 2025-01-12 NOTE — PROGRESS NOTE ADULT - PROBLEM SELECTOR PLAN 1
1/8/25 TM interpretation: Abnormal myeloblasts (93%), consistent w/ acute myeloid leukemia. FISH for PML-SOULEYMANE is negative. The overall findings are consistent with AML.    Monitor CBC w dif, CMP, TLS labs - replete electrolytes and transfuse blood products PRN  Consider rasburicase 3mg IV x 1 if uric acid >8, and 6mg IV x 1 if uric acid >12  IVF, daily weights, strict Is/Os, diuresis PRN  Mouth care, Antiemetics  Continue allopurinol 300mg QD  S/p leukophoresis x2 on 1/8 and 1/9 1/8 BM bx done. fu flow  1/10 Hydrea increased to 2000mg TID, Research RN meeting w/ pt to discuss study.  1/11 PICC ordered. WBC increasing, monitor on hydrea TID 1/8/25 TM interpretation: Abnormal myeloblasts (93%), consistent w/ acute myeloid leukemia. FISH for PML-SOULEYMANE is negative. The overall findings are consistent with AML.    Monitor CBC w dif, CMP, TLS labs - replete electrolytes and transfuse blood products PRN  Consider rasburicase 3mg IV x 1 if uric acid >8, and 6mg IV x 1 if uric acid >12  IVF, daily weights, strict Is/Os, diuresis PRN  Mouth care, Antiemetics  Continue allopurinol 300mg QD  S/p leukophoresis x2 on 1/8 and 1/9 1/8 BM bx done. fu flow  1/10 Hydrea increased to 2000mg TID, Research RN meeting w/ pt to discuss study.  1/11 PICC ordered. WBC increasing, monitor on hydrea TID  1/12 Dacogen started- pt w/ increasing WBCs and body aches 1/8/25 TM interpretation: Abnormal myeloblasts (93%), consistent w/ acute myeloid leukemia. FISH for PML-SOULEYMANE is negative. The overall findings are consistent with AML.    Monitor CBC w dif, CMP, TLS labs - replete electrolytes and transfuse blood products PRN  Consider rasburicase 3mg IV x 1 if uric acid >8, and 6mg IV x 1 if uric acid >12  IVF, daily weights, strict Is/Os, diuresis PRN  Mouth care, Antiemetics  Continue allopurinol 300mg QD  S/p leukophoresis x2 on 1/8 and 1/9 1/8 BM bx done. fu flow  1/10 Hydrea increased to 2000mg TID, Research RN meeting w/ pt to discuss study.  1/11 PICC ordered. WBC increasing, monitor on hydrea TID  1/12 Dacogen 20mg/m2 started- pt w/ increasing WBCs and body aches. Venetoclax to start once WBC decrease 1/8/25 TM interpretation: Abnormal myeloblasts (93%), consistent w/ acute myeloid leukemia. FISH for PML-SOULEYMANE is negative. The overall findings are consistent with AML.    Monitor CBC w dif, CMP, TLS labs - replete electrolytes and transfuse blood products PRN  Consider rasburicase 3mg IV x 1 if uric acid >8, and 6mg IV x 1 if uric acid >12  IVF, daily weights, strict Is/Os, diuresis PRN  Mouth care, Antiemetics  Continue allopurinol 300mg QD  S/p leukophoresis x2 on 1/8 and 1/9 1/8 BM bx done. fu flow  1/10 Hydrea increased to 2000mg TID, Research RN meeting w/ pt to discuss study.  1/11 PICC ordered. WBC increasing, monitor on hydrea TID  1/12 Dacogen 20mg/m2 started- pt w/ increasing WBCs and body aches. Venetoclax to start once WBC decrease. Lasix 40mg IV x1 given for fluid overload

## 2025-01-12 NOTE — PROGRESS NOTE ADULT - PROBLEM SELECTOR PROBLEM 3
Type 2 diabetes mellitus Hypertension Azathioprine Pregnancy And Lactation Text: This medication is Pregnancy Category D and isn't considered safe during pregnancy. It is unknown if this medication is excreted in breast milk.

## 2025-01-12 NOTE — PROGRESS NOTE ADULT - NS ATTEST RISK PROBLEM GEN_ALL_CORE FT
new acute leukemia with hyperleukoytosis.

## 2025-01-12 NOTE — ADVANCED PRACTICE NURSE CONSULT - ASSESSMENT
Pt A/Ox4, vital signs stable except tachy, afebrile. Labs reviewed by Dr. Goldberg and team on rounds. Right DL PICC easily flushed with positive blood return, dressing C/D/I, site intact with no s/s of redness, swelling, bleeding. Chest confirmed PICC placement in SVC 01/09/2025. Education on chemotherapy treatment done, pt verbalized understanding. S/p 8mg Zofran IVP Daily prior to chemo. S/p 2 certified RN verification. Day 1 at 1648, pt received Dacogen 20mg/m2 = 40mg IV infusing over 1 hour as a secondary line of saline through purple lumen of R DL PICC via alaris pump. Safety maintained, call bell in reach. Primary RN aware of plan of care.

## 2025-01-12 NOTE — PROGRESS NOTE ADULT - ASSESSMENT
74F with pmhx left side breast cancer (s/p lumpectomy, RT and chemo), colon cancer (s/p resection), CAD (s/p CABG x2), DM2, gout, HTN, HLD who presented initially to [facility] with lower back pain and transferred to [facility] for hyperleukocytosis with reported blasts concerning for newly diagnosed acute myeloid leukemia. Patient presenting with .33 and blast 69% noted on manual differential.

## 2025-01-12 NOTE — PROGRESS NOTE ADULT - NS ATTEND AMEND GEN_ALL_CORE FT
74F with new AML with hyperleukocytosis to 280k on presentation s/p leukapheresis x 2 and here for evaluation and treatment.     Pmhx: left side breast cancer (s/p lumpectomy, RT and chemo), colon cancer (s/p resection), CAD (s/p CABG x2), DM2, gout, HTN, HLD     Heme:  -Patient screened and eligible for Oncoverity trial of HMA/Venetoclax +/- cusatuzumab. Reviewing informed consent with family and will decide on study Monday.   -Flow cytometry of PB sample showed an abnormal myeloblast population (93% of cells) positive for dim CD45, CD38 (dim), CD13 (dim partial), CD33, CD11b (dim partial), MPO (dim partial), CD56, and negative for CD34, , HLA-DR, TdT, other myeloid-associated markers, , as well as B- and T-cell-associated markers.   -BMBx done 1/8, results PENDING.   -FLT3 ITD positive via PB.   -Continue with hydroxyurea 2 g TID at this time, WBC trending up.   -Continue with allopurinol, check TLS BID.   -R sided IJ catheter in place.     ID:  -Fever on 1/9, cultures PENDING.   -On Cefepime (1/9- ) as intermittently neutropenic and clearly functionally immunosuppressed.   -CXR (1/9) showed nonspecific cluster of nodular opacities in the right lateral upper lung, concern for possible pneumonia    Supportive care:  Anti-emetics   IVFs, strict Is/Os, daily weights, diuresis as needed. 74F with new AML with hyperleukocytosis to 280k on presentation s/p leukapheresis x 2 and here for evaluation and treatment.     Pmhx: left side breast cancer (s/p lumpectomy, RT and chemo), colon cancer (s/p resection), CAD (s/p CABG x2), DM2, gout, HTN, HLD     Heme:  -Patient screened and eligible for Oncoverity trial of HMA/Venetoclax +/- cusatuzumab. Reviewing informed consent with family and will decide on study Monday.   -Flow cytometry of PB sample showed an abnormal myeloblast population (93% of cells) positive for dim CD45, CD38 (dim), CD13 (dim partial), CD33, CD11b (dim partial), MPO (dim partial), CD56, and negative for CD34, , HLA-DR, TdT, other myeloid-associated markers, , as well as B- and T-cell-associated markers.   -BMBx done 1/8, results PENDING.   -FLT3 ITD positive via PB.   -Continue with hydroxyurea 2 g TID at this time, WBC still trending up.   -Continue with allopurinol, check TLS BID.     ID:  -Fever on 1/9, cultures negative to date.   -On Cefepime (1/9- ) as intermittently neutropenic and clearly functionally immunosuppressed.   -CXR (1/9) showed nonspecific cluster of nodular opacities in the right lateral upper lung, concern for possible pneumonia. Pending non contrast CT Chest.     Supportive care:  Anti-emetics   IVFs, strict Is/Os, daily weights, diuresis as needed. 74F with new AML diagnosis, now C1D1 of decitabine/venetoclax treatment, presented with hyperleukocytosis to 280k on presentation s/p leukapheresis x 2 and here for evaluation and treatment.     Pmhx: left side breast cancer (s/p lumpectomy, RT and chemo), colon cancer (s/p resection), CAD (s/p CABG x2), DM2, gout, HTN, HLD     Heme:  -Patient screened and eligible for Oncoverity trial of HMA/Venetoclax +/- cusatuzumab. Reviewing informed consent with family and was planning for enrollment for tomorrow, however with rising WBC count and high risk of delay in treatment at this point, decided with patient and family at bedside to begin treatment. Signed consent for decitabine/venetoclax induction therapy. Holding venetoclax until WBC count is <25k. Will then proceed with ramp up dosing.   -Flow cytometry of PB sample showed an abnormal myeloblast population (93% of cells) positive for dim CD45, CD38 (dim), CD13 (dim partial), CD33, CD11b (dim partial), MPO (dim partial), CD56, and negative for CD34, , HLA-DR, TdT, other myeloid-associated markers, , as well as B- and T-cell-associated markers.   -BMBx done 1/8, results PENDING.   -FLT3 ITD positive via PB PCR- if tolerating well can consider addition 10-14 day course of FLT3 inhibitor. Will await more confirmatory NGS panel.   -Continue with hydroxyurea 2 g TID at this time, WBC still trending up. Will discontinue as WBC decreases.   -Continue with allopurinol, check TLS BID.     ID:  -Fever on 1/9, cultures negative to date.   -On Cefepime (1/9- ) as intermittently neutropenic and clearly functionally immunosuppressed.   -CXR (1/9) showed nonspecific cluster of nodular opacities in the right lateral upper lung, concern for possible pneumonia. Pending non contrast CT Chest.     Supportive care:  Anti-emetics   IVFs, strict Is/Os, daily weights, diuresis as needed.

## 2025-01-12 NOTE — PROGRESS NOTE ADULT - PROBLEM SELECTOR PLAN 3
Pt does not take insulin at home.   check FS ac/hs w/ sliding scale Pt on home metoprolol 50mg BID  Cardiology consulted  1/12 pt hypertensive to 180s- amlodipine 5mg PO added per cards recs. Lasic 40mg IV x1 given for fluid overload. Pt on home metoprolol 50mg BID  Cardiology consulted  1/12 pt hypertensive to 180s- Added lisinopril 20mg QD and HCTZ 25mg QD (pt's home meds) Lasix 40mg IV x1 given for fluid overload.

## 2025-01-12 NOTE — PROGRESS NOTE ADULT - PROBLEM SELECTOR PLAN 2
Not neutropenic, last fever 1/9 1/7 blood cx NGTD  1/7 CT CAP No evidence of acute abnormality in the pelvis.  1/9 patient given one dose vanc and started on cefepime 2g Q8  1/9 fu blood cx, CXR: Nonspecific cluster of nodular opacities in the right lateral upper lung field not seen on prior. Findings may be external to the patient. If clinically concerned obtain repeat follow-up radiograph.  1/10 CXR Nonspecific cluster of nodular opacities in the right lateral upper lung field not seen on prior.  1/11 CT Chest. fu results Not neutropenic, last fever 1/9 1/7 blood cx NGTD  1/7 CT CAP No evidence of acute abnormality in the pelvis.  1/9 patient given one dose vanc and started on cefepime 2g Q8  1/9 fu blood cx, CXR: Nonspecific cluster of nodular opacities in the right lateral upper lung field not seen on prior. Findings may be external to the patient. If clinically concerned obtain repeat follow-up radiograph.  1/10 CXR Nonspecific cluster of nodular opacities in the right lateral upper lung field not seen on prior.  1/11 CT Chest. fu results  1/12 RVP fu results Not neutropenic, last fever 1/9 1/7 blood cx NGTD  1/7 CT CAP No evidence of acute abnormality in the pelvis.  1/9 patient given one dose vanc and started on cefepime 2g Q8  1/9 fu blood cx, CXR: Nonspecific cluster of nodular opacities in the right lateral upper lung field not seen on prior. Findings may be external to the patient. If clinically concerned obtain repeat follow-up radiograph.  1/10 CXR Nonspecific cluster of nodular opacities in the right lateral upper lung field not seen on prior.  1/11 CT Chest. fu results  1/12 CXR No focal consolidation,  pleural effusion or pneumothorax. Previously   described nodular opacities in the right lateral upper lung field are not   visualized.  1/12 febrile 100.9 blood cx, urine cx, RVP fu results

## 2025-01-12 NOTE — PROVIDER CONTACT NOTE (OTHER) - ACTION/TREATMENT ORDERED:
Provider aware, pending 1g IVPB Tylenol, pending IVP Dilaudid for pain/discomfort, pending UA/UC, BC x2, pending PO Lisinopril, pending PO Hydrothrothizade, repeat BP, nursing care ongoing. Provider aware, pending 1g IVPB Tylenol, pending IVP Dilaudid for pain/discomfort, pending UA/UC, BC x2, pending PO Lisinopril, pending PO Hydrochlorothiazide, repeat BP, nursing care ongoing.

## 2025-01-12 NOTE — PROGRESS NOTE ADULT - PROBLEM SELECTOR PLAN 4
Holding pharmacological dvt ppx due to thrombocytopenia  OOB/encourage ambulation  miralax QD, senna @HS Pt does not take insulin at home.   check FS ac/hs w/ sliding scale

## 2025-01-12 NOTE — PROGRESS NOTE ADULT - ASSESSMENT
Ellie is a 75 y/o F PMH breast cancer in remission('99, '23), colon cancer '01 s/p resection in remission, HTN, HLD, DM2, CAD status post CABG @ [facility] &, vertigo.    Presents to [place] ED 1/7 c/o generalized back pain x 3 months with some abdominal discomfort over the past 1 week. She has been feeling some profound weakness and fatigue and has been unable to carry on ADLs.  Over the past 3 days or so she has had some dyspnea on exertion. Patient is having some left upper leg discomfort.      - mild troponin elevation, though no suggestion of acs (Trop I 143--> Trop T 57)  - ekg with sr, rbbb, nsst abn  - TTE with normal BiV function, no WMA noted.   - without cp or dyspnea during my exam      # AML  - found to have significantly elevated WBC at >280k, now markedly decreased after leukapheresis   - troponin elevation 2/2 demand ischemia in the setting of new leukemia, likely AML. S/p BMB as well.   - Heme onc following. S/p RIJ catheter placed s/p therapeutic leukapheresis   - bnp elevated, though no does not appear volume overloaded  - start amlodipine if needed for bp control    #CAD s/p CABG:  -Chronic stable ischemic heart disease  - On home Statin  - Remains on BB  - ASA on hold given significant thrombocytopenia

## 2025-01-12 NOTE — PROGRESS NOTE ADULT - SUBJECTIVE AND OBJECTIVE BOX
Diagnosis: AML     Protocol/Chemo Regimen: TBD    Day: N/A     Pt endorsed: general fatigue but feels ok    Review of Systems: Denies fever, chills, nausea, vomiting, abdominal pain, diarrhea, constipation, CP, palpitations, SOB, or LE edema.     Pain scale: Denies                                      Location: N/A    Diet: DASH/TLC, CC/no snacks      ANTIMICROBIALS  cefepime   IVPB 2000 milliGRAM(s) IV Intermittent every 8 hours      HEME/ONC MEDICATIONS  hydroxyurea 2000 milliGRAM(s) Oral three times a day      STANDING MEDICATIONS  allopurinol 300 milliGRAM(s) Oral daily  Biotene Dry Mouth Oral Rinse 15 milliLiter(s) Swish and Spit three times a day  chlorhexidine 2% Cloths 1 Application(s) Topical <User Schedule>  chlorhexidine 4% Liquid 1 Application(s) Topical <User Schedule>  insulin lispro (ADMELOG) corrective regimen sliding scale   SubCutaneous Before meals and at bedtime  metoprolol tartrate 50 milliGRAM(s) Oral two times a day  polyethylene glycol 3350 17 Gram(s) Oral daily  senna 2 Tablet(s) Oral at bedtime      PRN MEDICATIONS  sodium chloride 0.9% lock flush 10 milliLiter(s) IV Push every 1 hour PRN        Vital Signs Last 24 Hrs  T(C): 36.9 (12 Jan 2025 04:58), Max: 37.7 (12 Jan 2025 00:41)  T(F): 98.5 (12 Jan 2025 04:58), Max: 99.8 (12 Jan 2025 00:41)  HR: 78 (12 Jan 2025 04:58) (72 - 105)  BP: 132/66 (12 Jan 2025 04:58) (125/70 - 181/65)  BP(mean): --  RR: 18 (12 Jan 2025 04:58) (18 - 20)  SpO2: 96% (12 Jan 2025 04:58) (96% - 98%)    Parameters below as of 12 Jan 2025 04:58  Patient On (Oxygen Delivery Method): room air        PHYSICAL EXAM  General: adult in NAD  HEENT: clear oropharynx, anicteric sclera  CV: normal S1/S2 RRR  Lungs: positive air movement b/l ant lungs,clear to auscultation, no wheezes, no rales  Abdomen: soft non-tender non-distended, no hepatosplenomegaly  Ext: no edema  Skin: no rashes and no petechiae  Neuro: alert and oriented X 4  PIV CDI    LABS:    Recent cultures:     Culture - Urine (01.10.25 @ 09:48)    Specimen Source: Clean Catch Clean Catch (Midstream)   Culture Results:   No growth    Culture - Blood (01.10.25 @ 00:06)    Specimen Source: .Blood BLOOD   Culture Results:   No growth at 48 Hours    Culture - Blood (01.10.25 @ 00:05)    Specimen Source: .Blood BLOOD   Culture Results:   No growth at 48 Hours    Culture - Blood (01.07.25 @ 23:51)    Specimen Source: .Blood BLOOD   Culture Results:   No growth at 4 days    Culture - Blood (01.07.25 @ 23:50)    Specimen Source: .Blood BLOOD   Culture Results:   No growth at 4 days               7.7    138.50 )-----------( 22       ( 12 Jan 2025 07:27 )             24.2     Mean Cell Volume : 93.8 fl  Mean Cell Hemoglobin : 29.8 pg  Mean Cell Hemoglobin Concentration : 31.8 g/dL  Auto Neutrophil # : x  Auto Lymphocyte # : x  Auto Monocyte # : x  Auto Eosinophil # : x  Auto Basophil # : x  Auto Neutrophil % : x  Auto Lymphocyte % : x  Auto Monocyte % : x  Auto Eosinophil % : x  Auto Basophil % : x    01-12    136  |  97  |  21  ----------------------------<  222[H]  3.7   |  22  |  0.92    Ca    9.7      12 Jan 2025 07:26  Phos  4.0     01-12  Mg     1.8     01-12    TPro  7.8  /  Alb  3.9  /  TBili  0.7  /  DBili  x   /  AST  40  /  ALT  20  /  AlkPhos  79  01-12    PT/INR - ( 12 Jan 2025 07:27 )   PT: 12.9 sec;   INR: 1.13 ratio      PTT - ( 12 Jan 2025 07:27 )  PTT:25.3 sec      Uric Acid 1.8    RADIOLOGY & ADDITIONAL STUDIES:    < from: Xray Chest 1 View- PORTABLE-Urgent (Xray Chest 1 View- PORTABLE-Urgent .) (01.09.25 @ 23:45) >  IMPRESSION:  Nonspecific cluster of nodular opacities in the right lateral upper lung   field not seen on prior. Findings may be external to the patient. If   clinically concerned obtain repeat follow-up radiograph.           Diagnosis: FLT3+ AML    Protocol/Chemo Regimen: Dacogen days 1-5, Venetoclax TBD    Day: 1     Pt endorsed: body aches x 1 day    Review of Systems: Denies fever, chills, nausea, vomiting, abdominal pain, diarrhea, constipation, CP, palpitations, SOB, or LE edema.     Pain scale: Ungraded                                    Location: generalized body aches    Diet: DASH/TLC, CC/no snacks    ANTIMICROBIALS  cefepime   IVPB 2000 milliGRAM(s) IV Intermittent every 8 hours    HEME/ONC MEDICATIONS  hydroxyurea 2000 milliGRAM(s) Oral three times a day    STANDING MEDICATIONS  allopurinol 300 milliGRAM(s) Oral daily  Biotene Dry Mouth Oral Rinse 15 milliLiter(s) Swish and Spit three times a day  chlorhexidine 2% Cloths 1 Application(s) Topical <User Schedule>  chlorhexidine 4% Liquid 1 Application(s) Topical <User Schedule>  insulin lispro (ADMELOG) corrective regimen sliding scale   SubCutaneous Before meals and at bedtime  metoprolol tartrate 50 milliGRAM(s) Oral two times a day  polyethylene glycol 3350 17 Gram(s) Oral daily  senna 2 Tablet(s) Oral at bedtime      PRN MEDICATIONS  sodium chloride 0.9% lock flush 10 milliLiter(s) IV Push every 1 hour PRN    Vital Signs Last 24 Hrs  T(C): 36.9 (12 Jan 2025 04:58), Max: 37.7 (12 Jan 2025 00:41)  T(F): 98.5 (12 Jan 2025 04:58), Max: 99.8 (12 Jan 2025 00:41)  HR: 78 (12 Jan 2025 04:58) (72 - 105)  BP: 132/66 (12 Jan 2025 04:58) (125/70 - 181/65)  BP(mean): --  RR: 18 (12 Jan 2025 04:58) (18 - 20)  SpO2: 96% (12 Jan 2025 04:58) (96% - 98%)    Parameters below as of 12 Jan 2025 04:58  Patient On (Oxygen Delivery Method): room air    PHYSICAL EXAM  General: adult in NAD  HEENT: clear oropharynx, anicteric sclera  CV: normal S1/S2 RRR  Lungs: positive air movement b/l ant lungs,clear to auscultation, no wheezes, no rales  Abdomen: soft non-tender non-distended, no hepatosplenomegaly  Ext: no edema  Skin: no rashes and no petechiae  Neuro: alert and oriented X 4  PICC CDI    LABS:    Recent cultures:     Culture - Urine (01.10.25 @ 09:48)    Specimen Source: Clean Catch Clean Catch (Midstream)   Culture Results:   No growth    Culture - Blood (01.10.25 @ 00:06)    Specimen Source: .Blood BLOOD   Culture Results:   No growth at 48 Hours    Culture - Blood (01.10.25 @ 00:05)    Specimen Source: .Blood BLOOD   Culture Results:   No growth at 48 Hours    Culture - Blood (01.07.25 @ 23:51)    Specimen Source: .Blood BLOOD   Culture Results:   No growth at 4 days    Culture - Blood (01.07.25 @ 23:50)    Specimen Source: .Blood BLOOD   Culture Results:   No growth at 4 days               7.7    138.50 )-----------( 22       ( 12 Jan 2025 07:27 )             24.2     Mean Cell Volume : 93.8 fl  Mean Cell Hemoglobin : 29.8 pg  Mean Cell Hemoglobin Concentration : 31.8 g/dL  Auto Neutrophil # : x  Auto Lymphocyte # : x  Auto Monocyte # : x  Auto Eosinophil # : x  Auto Basophil # : x  Auto Neutrophil % : x  Auto Lymphocyte % : x  Auto Monocyte % : x  Auto Eosinophil % : x  Auto Basophil % : x    01-12    136  |  97  |  21  ----------------------------<  222[H]  3.7   |  22  |  0.92    Ca    9.7      12 Jan 2025 07:26  Phos  4.0     01-12  Mg     1.8     01-12    TPro  7.8  /  Alb  3.9  /  TBili  0.7  /  DBili  x   /  AST  40  /  ALT  20  /  AlkPhos  79  01-12    PT/INR - ( 12 Jan 2025 07:27 )   PT: 12.9 sec;   INR: 1.13 ratio      PTT - ( 12 Jan 2025 07:27 )  PTT:25.3 sec      Uric Acid 1.8    RADIOLOGY & ADDITIONAL STUDIES:    < from: Xray Chest 1 View- PORTABLE-Urgent (Xray Chest 1 View- PORTABLE-Urgent .) (01.09.25 @ 23:45) >  IMPRESSION:  Nonspecific cluster of nodular opacities in the right lateral upper lung   field not seen on prior. Findings may be external to the patient. If   clinically concerned obtain repeat follow-up radiograph.

## 2025-01-12 NOTE — ADVANCED PRACTICE NURSE CONSULT - ASSESSMENT
Central Line Catheter Insertion Note  Patient or patient representative educated about central line associated blood stream infection prevention practices.    Catheter type: DL SOLO PICC  : Bard/ [id number]  Power injectable: Yes  Procedure assisted by: Melonie Munoz RN    Informed consent obtained by covering floor team. Time out was preformed, confirming the patient's first and last name, date of birth, procedure, and correct site prior to state of procedure.    Patient was placed with HOB up 30 degrees. Patient placement site was prepped with chlorhexidine solution, then draped using maximum sterile barrier protection. The area was injected with 2 ml of 1% lidocaine. Using the ultrasound, the catheter was introduced. Strict adherence to outline aseptic technique. Upon completion of line placement, the insertion site was covered with a sterile occlusive dressing. Pt tolerated procedure well. Minimal blood loss.     All materials used for catheter insertion, including the intact guide wires, were accounted for at the end of the procedure.    Number of attempts: 1  Complications/Comments: none     Emergency Placement: No  Site: New site   Anatomical Site of insertion: R Brachial   Catheter size/length: 35 cm., 4 Fr.  US guided Bard DL SOLO PICC placed    Pre/post-procedure vitals  Post procedure verification with CXR as per orders.

## 2025-01-13 NOTE — PHARMACOTHERAPY INTERVENTION NOTE - COMMENTS
DARÍO PADILLA 74y F with PMH breast cancer and colon cancer s/p resection and in remission, CAD s/p CABG x2, T2DM, gout, HTN, HLD directly admitted to MICU after presenting to [facility] w/ hyperleukocytosis w/ blast crises c/f new leukemia.  Historical Values  Comprehensive Metabolic Panel (01.14.25 @ 09:19)   Glucose: 299 mg/dL  Comprehensive Metabolic Panel (01.14.25 @ 01:36)   Glucose: 341 mg/dL  Comprehensive Metabolic Panel (01.13.25 @ 19:32)   Glucose: 326 mg/dL  Comprehensive Metabolic Panel (01.13.25 @ 10:51)   Glucose: 311 mg/dL  Comprehensive Metabolic Panel (01.13.25 @ 06:42)   Glucose: 624 mg/dL  Over the last 24 hours the patients glucose level has been persistently elevated, requiring 21U of sliding scale insule.  Can consider starting insulin NPH 3U q6h.    Recommendations:  1. Start insulin NPH 3U q6h    Discussed with MICU team and adjusted as above.    Santana Galicia, PharmD  PGY-2 Critical Care Pharmacy Resident  Available via [employer] Teams

## 2025-01-13 NOTE — PHARMACOTHERAPY INTERVENTION NOTE - COMMENTS
Clinical Pharmacy Specialist- Hematology/Oncology- Progress Note    Pt is ***PMH    Antimicrobial Course:  -None needed currently  -Posaconazole- ***date  -Cefepime/Levofloxacin ***date  -Valacylovir-   MRSA nasal swab    Last Neutropenic (ANC<1000):   Last Febrile:    (Tmax- )  Days Non-Neutropenic:   Days afebrile:    Chemotherapy Course  -Current Regimen:  History:  -Day:  BmBx:  Access:     History/Relevant clinical information used in assessment:  -Crcl= ***;Scr=***; Wt=*** ; ***Adjusted BW used since pt BMI>30/ Actual BW used since BMI<30    Assessment/Plan/Recommendation:      Additional Monitoring Needed?   -Yes- Continue to monitor renal function & daily counts for abx escalation/de-escalation   -Discharge Planning:  --> New meds:  --> Meds sent for auth:  --> Delivered meds:    Case discussed with attending/primary team    Dev CalleD, BCPS  Clinical Pharmacy Specialist | Hematology/Oncology  [facility]  Email: [email or web address] or available on [employer]   Clinical Pharmacy Specialist- Hematology/Oncology- Progress Note    Pt is a 73 y/o female with PMHx breast cancer (s/p lumpectomy/xrt/chemo in 1999 w/ local recurrence in 2023 s/p resection), colon ca (s/p resection in 2021), CAD (s/p CABG x2 in 201), HTN, HLD and T2DM, and newly diagnosed AML starting indiction therapy with Decitabine + venetoclax (to be added once cytoreduced), and Mylotarg for cytoreduction    Antimicrobial Course:  - Vancomycin- 1/10 x 1  - Cefepime- 1/9-  - Valtrex- 1/13  MRSA nasal swab    Last Neutropenic (ANC<1000): no occurrence  Last Febrile:  1/13@17:15; T= 101.5  (Tmax= 101.5;1/13@17:15)  Days Non-Neutropenic: 7  Days afebrile: 0    Chemotherapy Course  -Current Regimen: Decitabine + Venetoclax (mandie to be added later)  History:  (1/12/25) C1  -Decitabine 20mg/m2 IVPB D1-5- started 1/12  -Venetoclax PO daily (to be added later)  -Day: 2 (1/13)  BmBx:  Access:     History/Relevant clinical information used in assessment:  - Ht= 5'5"; IBW= 57kg; Adj BW= 71kg  - 1/13- Crcl= 49ml/min; Scr=1.11; Wt= 71kg ; Adjusted BW used since pt BMI>30/    Assessment/Plan/Recommendation:  - discussed for cytoreduction will start mylotarg today (capped at 4.5mg) x 1   - will add posaconazole later  - venetoclax to be added once WBC   - on hydrea 2gm TID  - cards-on HCTZ, Lisinopril 40mg daily, would consider amlodipine over HCTZ;  lopressor switched to carvedilol 6.25mg q2hr (/91)- when venetoclax starts, if hypotensive, option to switch to labetalol to avoid DDI    Additional Monitoring Needed?   -Yes- Continue to monitor renal function & daily counts for abx escalation/de-escalation   -Discharge Planning:  --> New meds:  --> Meds sent for auth:  --> Delivered meds:    Case discussed with attending/primary team    Joshua Brown, PharmD, BCPS  Clinical Pharmacy Specialist | Hematology/Oncology  [facility]  Email: [email or web address] or available on [employer]   Clinical Pharmacy Specialist- Hematology/Oncology- Progress Note    Pt is a 73 y/o female with PMHx breast cancer (s/p lumpectomy/xrt/chemo in 1999 w/ local recurrence in 2023 s/p resection), colon ca (s/p resection in 2021), CAD (s/p CABG x2 in 201), HTN, HLD and T2DM, and newly diagnosed AML starting indiction therapy with Decitabine + venetoclax (to be added once cytoreduced), and Mylotarg for cytoreduction    Antimicrobial Course:  - Vancomycin- 1/10 x 1  - Cefepime- 1/9-  - Valtrex- 1/13  MRSA nasal swab    Last Neutropenic (ANC<1000): no occurrence  Last Febrile:  1/13@17:15; T= 101.5  (Tmax= 101.5;1/13@17:15)  Days Non-Neutropenic: 7  Days afebrile: 0    Chemotherapy Course  -Current Regimen: Decitabine + Venetoclax (mandie to be added later)  History:  (1/12/25) C1  -Decitabine 20mg/m2 IVPB D1-5- started 1/12  -Venetoclax PO daily (to be added later)  -Day: 2 (1/13)  BmBx:  Access:     History/Relevant clinical information used in assessment:  - Ht= 5'5"; IBW= 57kg; Adj BW= 71kg  - 1/13- Crcl= 49ml/min; Scr=1.11; Wt= 71kg ; Adjusted BW used since pt BMI>30/  - sulfa allergy- tolerates lasixl pcn allergy- tolerates cefepime    Assessment/Plan/Recommendation:  - discussed for cytoreduction will start mylotarg today (capped at 4.5mg) x 1   - will add posaconazole later  - venetoclax to be added once WBC   - CT chest 1/11- on cefepime  - on hydrea 2gm TID  - cards-on HCTZ, Lisinopril 40mg daily, would consider amlodipine over HCTZ;  lopressor switched to carvedilol 6.25mg q2hr (/91)- when venetoclax starts, if hypotensive, option to switch to labetalol to avoid DDI, pravastatin ok w/ venetoclax    Additional Monitoring Needed?   -Yes- Continue to monitor renal function & daily counts for abx escalation/de-escalation   -Discharge Planning:  --> New meds: coreg (was on metoprolol), lisinopril 40mg  --> Meds sent for auth:  --> Delivered meds:    Case discussed with attending/primary team    Joshua Brown, PharmD, [facility]  Clinical Pharmacy Specialist | Hematology/Oncology  [facility]  Email: [email or web address] or available on [employer]   Clinical Pharmacy Specialist- Hematology/Oncology- Progress Note    Pt is a 73 y/o female with PMHx breast cancer (s/p lumpectomy/xrt/chemo in 1999 w/ local recurrence in 2023 s/p resection), colon ca (s/p resection in 2021), CAD (s/p CABG x2 in 201), HTN, HLD and T2DM, and newly diagnosed AML starting indiction therapy with Decitabine + venetoclax (to be added once cytoreduced), and Mylotarg for cytoreduction    Antimicrobial Course:  - Vancomycin- 1/10 x 1  - Cefepime- 1/9-  - Valtrex- 1/13  MRSA nasal swab    Last Neutropenic (ANC<1000): no occurrence  Last Febrile:  1/13@17:15; T= 101.5  (Tmax= 101.5;1/13@17:15)  Days Non-Neutropenic: 7  Days afebrile: 0    Chemotherapy Course  -Current Regimen: Decitabine + Venetoclax (mandie to be added later)  History:  (1/12/25) C1  -Decitabine 20mg/m2 IVPB D1-5- started 1/12  -Venetoclax PO daily (to be added later)  -Day: 2 (1/13)  BmBx:  Access: DL SOLO PICC (1/12)    History/Relevant clinical information used in assessment:  - Ht= 5'5"; IBW= 57kg; Adj BW= 71kg  - 1/13- Crcl= 49ml/min; Scr=1.11; Wt= 71kg ; Adjusted BW used since pt BMI>30/  - sulfa allergy- tolerates lasixl pcn allergy- tolerates cefepime    Assessment/Plan/Recommendation:  - discussed for cytoreduction will start mylotarg today (capped at 4.5mg) x 1   - will add posaconazole later  - venetoclax to be added once WBC   - CT chest 1/11- on cefepime  - on hydrea 2gm TID  - cards-on HCTZ, Lisinopril 40mg daily, would consider amlodipine over HCTZ;  lopressor switched to carvedilol 6.25mg q2hr (/91)- when venetoclax starts, if hypotensive, option to switch to labetalol to avoid DDI, pravastatin ok w/ venetoclax    Additional Monitoring Needed?   -Yes- Continue to monitor renal function & daily counts for abx escalation/de-escalation   -Discharge Planning:  --> New meds: coreg (was on metoprolol), lisinopril 40mg  --> Meds sent for auth:  --> Delivered meds:    Case discussed with attending/primary team    Joshua Brown, PharmD, BCPS  Clinical Pharmacy Specialist | Hematology/Oncology  [facility]  Email: [email or web address] or available on [employer]

## 2025-01-13 NOTE — PROGRESS NOTE ADULT - ASSESSMENT
Ellie is a 75 y/o F PMH breast cancer in remission('99, '23), colon cancer '01 s/p resection in remission, HTN, HLD, DM2, CAD status post CABG @ [facility] &, vertigo.    Presents to [facility] 1/7 c/o generalized back pain x 3 months with some abdominal discomfort over the past 1 week. She has been feeling some profound weakness and fatigue and has been unable to carry on ADLs.  Over the past 3 days or so she has had some dyspnea on exertion. Patient is having some left upper leg discomfort.      - mild troponin elevation, though no suggestion of acs (Trop I 143--> Trop T 57)  - ekg with sr, rbbb, nsst abn  - TTE with normal BiV function, no WMA noted.   - without cp but feeling very SOB this AM  - Appears dry on exam  - CXR from 1/12 with clear lung fields  - Significant anemia noted on labs this AM likely contributing to SOB    # AML  - found to have significantly elevated WBC at >280k, s/p leukapheresis   - troponin elevation 2/2 demand ischemia in the setting of new leukemia, likely AML. S/p BMB as well.   - Heme onc following. S/p RIJ catheter placed s/p therapeutic leukapheresis   - bnp elevated, though no does not appear volume overloaded    #HTN: Now on HCTZ, Lisinopril   - Would increase Lisinopril dosing  - Would consider amlodipine over HCTZ   - Please switch Lopressor to Coreg for improved BP control     #CAD s/p CABG:  -Chronic stable ischemic heart disease  - On home Statin  - Switch Lopressor to Coreg  - ASA on hold given significant thrombocytopenia

## 2025-01-13 NOTE — PROVIDER CONTACT NOTE (CRITICAL VALUE NOTIFICATION) - ASSESSMENT
pt with AMS
ams
altered mental status
pt A&Ox4, pt afebrile at this time
pt with altered mental status
NAD noted
Pt. is aox4 and remains [id number] stable

## 2025-01-13 NOTE — PROGRESS NOTE ADULT - PROBLEM SELECTOR PLAN 3
Pt on home metoprolol 50mg BID  Cardiology consulted  1/12 pt hypertensive to 180s- Added lisinopril 20mg QD and HCTZ 25mg QD (pt's home meds) Lasix 40mg IV x1 given for fluid overload.  1/13 Per cardio recs, switched lopressor to coreg Pt on home metoprolol 50mg BID  Cardiology consulted  1/12 pt hypertensive to 180s- Added lisinopril 20mg QD and HCTZ 25mg QD (pt's home meds) Lasix 40mg IV x1 given for fluid overload.  1/13 Per cardio recs, switched lopressor to coreg and increased lisinopril to 40mg

## 2025-01-13 NOTE — RAPID RESPONSE TEAM SUMMARY - NSSITUATIONBACKGROUNDRRT_GEN_ALL_CORE
73 y/o F with PMHx breast cancer s/p lumpectomy, RT and chemo in 1999 w/ local recurrence in 2023 s/p resection; colon ca s/p resection in 2021, CAD s/p CABG x2 in 2010, HTN, HLD and T2DM who initially presented to [facility] for lethargy x 1 month w/ exertional SOB, acute on chronic low back pain and new b/l leg pain. She was found to have  w/ 69% blasts, transferred to [facility] for hematology evaluation of new acute leukemia and blast crisis. RRT called for hypertension to SBP 200s and altered mental status.

## 2025-01-13 NOTE — PROVIDER CONTACT NOTE (CRITICAL VALUE NOTIFICATION) - ACTION/TREATMENT ORDERED:
no orders at this time, will continue to monitor
ACP Corin mccullough aware. Safety maintained
awaiting orders
awaiting orders
awaiting orders at this time. assigned rn made aware
blood ordered
Provider aware, continuing pt on TID PO Hydrea, nursing care ongoing

## 2025-01-13 NOTE — PROGRESS NOTE ADULT - SUBJECTIVE AND OBJECTIVE BOX
Diagnosis: FLT3+ AML    Protocol/Chemo Regimen: Dacogen days 1-5, Mylotarg on day 2, Venetoclax TBD    Day: 2     Pt endorsed: body aches x 1 day    Review of Systems: Denies fever, chills, nausea, vomiting, abdominal pain, diarrhea, constipation, CP, palpitations, SOB, or LE edema.     Pain scale: Ungraded                                    Location: generalized body aches    Diet: DASH/TLC, CC/no snacks      Allergies  atenolol (Unknown)  penicillin (Hives)  sulfa drugs (Hives)  shellfish (Hives)    Intolerances        ANTIMICROBIALS  cefepime   IVPB      cefepime   IVPB 2000 milliGRAM(s) IV Intermittent every 8 hours  valACYclovir 500 milliGRAM(s) Oral every 12 hours      HEME/ONC MEDICATIONS  decitabine IVPB (eMAR) 40 milliGRAM(s) IV Intermittent every 24 hours  gemtuzumab IVPB (eMAR) 4.5 milliGRAM(s) IV Intermittent once  hydroxyurea 2000 milliGRAM(s) Oral three times a day      STANDING MEDICATIONS  acetaminophen     Tablet .. 650 milliGRAM(s) Oral once  allopurinol 300 milliGRAM(s) Oral daily  Biotene Dry Mouth Oral Rinse 15 milliLiter(s) Swish and Spit three times a day  carvedilol 6.25 milliGRAM(s) Oral every 12 hours  chlorhexidine 2% Cloths 1 Application(s) Topical <User Schedule>  chlorhexidine 4% Liquid 1 Application(s) Topical <User Schedule>  decitabine 50 milliGRAM(s) Injectable IVPB (Rx Quick Charge) 10 milliGRAM(s) Waste   diphenhydrAMINE Injectable 50 milliGRAM(s) IV Push once  hydrochlorothiazide 25 milliGRAM(s) Oral daily  insulin lispro (ADMELOG) corrective regimen sliding scale   SubCutaneous Before meals and at bedtime  lisinopril 20 milliGRAM(s) Oral daily  methylPREDNISolone sodium succinate Injectable 40 milliGRAM(s) IV Push once  ondansetron Injectable 8 milliGRAM(s) IV Push every 24 hours  polyethylene glycol 3350 17 Gram(s) Oral daily  senna 2 Tablet(s) Oral at bedtime  sodium chloride 0.9%. 1000 milliLiter(s) IV Continuous <Continuous>      PRN MEDICATIONS  acetaminophen     Tablet .. 650 milliGRAM(s) Oral every 6 hours PRN  oxyCODONE    IR 5 milliGRAM(s) Oral every 6 hours PRN  sodium chloride 0.9% lock flush 10 milliLiter(s) IV Push every 1 hour PRN        Vital Signs Last 24 Hrs  T(C): 38.6 (13 Jan 2025 13:30), Max: 38.6 (13 Jan 2025 13:30)  T(F): 101.4 (13 Jan 2025 13:30), Max: 101.4 (13 Jan 2025 13:30)  HR: 106 (13 Jan 2025 13:30) (85 - 106)  BP: 171/69 (13 Jan 2025 13:30) (163/79 - 215/87)  BP(mean): --  RR: 18 (13 Jan 2025 13:30) (18 - 22)  SpO2: 100% (13 Jan 2025 13:30) (95% - 100%)    Parameters below as of 13 Jan 2025 13:30  Patient On (Oxygen Delivery Method): nasal cannula  O2 Flow (L/min): 2      PHYSICAL EXAM  General: NAD  HEENT: clear oropharynx, anicteric sclera, pink conjunctiva  Neck: supple  CV: (+) S1/S2 RRR  Lungs: positive air movement b/l ant lungs, clear to auscultation, no wheezes, no rales  Abdomen: soft, non-tender, non-distended  Ext: no clubbing cyanosis or edema  Skin: no rashes and no petechiae  Neuro: alert and oriented X 3, no focal deficits  Central Line: PICC c/d/i    RECENT CULTURES:  01-10 @ 09:48  Clean Catch Clean Catch (Midstream)  --  --  --    No growth  --  01-10 @ 00:06  .Blood BLOOD  --  --  --    No growth at 72 Hours  --  01-10 @ 00:05  .Blood BLOOD  --  --  --    No growth at 72 Hours  --  01-07 @ 23:51  .Blood BLOOD  --  --  --    No growth at 5 days  --  01-07 @ 23:50  .Blood BLOOD  --  --  --    No growth at 5 days  --  01-07 @ 12:52  .Blood BLOOD  --  --  --    No growth at 5 days  --  01-07 @ 12:47  .Blood BLOOD  --  --  --    No growth at 5 days  --        LABS:                        6.4    127.91 )-----------( 14       ( 13 Jan 2025 06:43 )             20.3         Mean Cell Volume : 94.4 fl  Mean Cell Hemoglobin : 29.8 pg  Mean Cell Hemoglobin Concentration : 31.5 g/dL  Auto Neutrophil # : 3.58 K/uL  Auto Lymphocyte # : 7.16 K/uL  Auto Monocyte # : 2.30 K/uL  Auto Eosinophil # : 0.00 K/uL  Auto Basophil # : 0.00 K/uL  Auto Neutrophil % : 0.9 %  Auto Lymphocyte % : 5.6 %  Auto Monocyte % : 1.8 %  Auto Eosinophil % : 0.0 %  Auto Basophil % : 0.0 %      01-13    124[L]  |  86[L]  |  29[H]  ----------------------------<  311[H]  4.3   |  21[L]  |  1.11    Ca    8.9      13 Jan 2025 10:51  Phos  2.7     01-13  Mg     1.5     01-13    TPro  8.1  /  Alb  4.0  /  TBili  1.0  /  DBili  x   /  AST  66[H]  /  ALT  21  /  AlkPhos  166[H]  01-13      PT/INR - ( 13 Jan 2025 09:45 )   PT: 16.1 sec;   INR: 1.40 ratio         PTT - ( 13 Jan 2025 09:45 )  PTT:24.2 sec    LDH 1079  Uric Acid 2.0      RADIOLOGY & ADDITIONAL STUDIES:  < from: Xray Chest 1 View- PORTABLE-Urgent (Xray Chest 1 View- PORTABLE-Urgent .) (01.12.25 @ 12:36) >  IMPRESSION:  Right upper extremity PICC line in the SVC.    < end of copied text >

## 2025-01-13 NOTE — CHART NOTE - NSCHARTNOTEFT_GEN_A_CORE
Signed out for RRT in progress due to HTN and AMS. HTN improved s/p labetalol and hydralazine and Pt sent down to [facility]. Pt seen and examined at bedside after return from [facility] with RRT still in progress. Noted to be mentating back at baseline on BIPAP for increased WOB. CTH prelin neg. Labs reviewed and Pt noted with worsening leukocytosis, anemia, new GODFREY, and lactic acidosis. On exam Pt tachypneic to 30's, however with no complaints of chest pain, SOB, dizziness, n/v/d.     Of note patient only received 15 min of Mylotarg infusion. Chemo stopped at 6:45PM due to HTN and RRT. Discussed above events with Dr. Ramos, who recommends MICU consult.    Plan:  >C/w on BIPAP  >Repeat VBG in 1 hour, trend lactate  >C/w NS at 50cc/hr  >Start 1 PRBC transfusion  >MICU consulted due to tachypnea on BIPAP and worsening lactic acidosis > Accepted and transferred.    Rebecca Jang PA-C  Department of Medicine

## 2025-01-13 NOTE — PROVIDER CONTACT NOTE (CRITICAL VALUE NOTIFICATION) - PERSON GIVING RESULT:
Marybel Leigh/ jacqueline
Santy Hercules
Venecia Lopez/Delisa
Lab; Reji Melendez
quan dang/ jacqueline
norah white/ lab
norah white/ lab

## 2025-01-13 NOTE — PROVIDER CONTACT NOTE (CRITICAL VALUE NOTIFICATION) - BACKGROUND
AML, Hx HTN
pt dx with aml
s/p RRT, hx aml
pt dx with aml
Dx Acute leukemia not having achieved remissio
dx blast cell leukemia
hx AML

## 2025-01-13 NOTE — ADVANCED PRACTICE NURSE CONSULT - REASON FOR CONSULT
Chemotherapy note : Dacogen day 2/5, consent in chart. Chemotherapy note : Dacogen day 2/5, consent in chart.  Mylotarg Day 1/1

## 2025-01-13 NOTE — PROVIDER CONTACT NOTE (CRITICAL VALUE NOTIFICATION) - TEST AND RESULT REPORTED:
.87
ABG H/H 6.3/19, lactate 7.9
WBC 99.32
venous gas lactate 6.9, h/h 6.2/19
h/h 5.7/17.1
lactate 7.2, glucose 452, h/h 6.3/19
WBC 95.33
WBC= 138.50

## 2025-01-13 NOTE — PROGRESS NOTE ADULT - PROBLEM SELECTOR PLAN 2
Not neutropenic, febrile   1/9 patient given one dose vanc and started on cefepime 2g Q8  1/9 fu blood cx, CXR: Nonspecific cluster of nodular opacities in the right lateral upper lung field not seen on prior. Findings may be external to the patient. If clinically concerned obtain repeat follow-up radiograph.  1/10 CXR Nonspecific cluster of nodular opacities in the right lateral upper lung field not seen on prior.  1/11 CT Chest. fu results  1/12 BCX ordered f/u

## 2025-01-13 NOTE — RAPID RESPONSE TEAM SUMMARY - NSMEDICATIONSRRT_GEN_ALL_CORE
Additional labetalol 10mg IVP was given and BP decreased to 150-160s systolic. Pt was placed on BiPAP for increased WOB and lasix was given for suspected flash pulmonary edema. Pt was also found with about 700 cc's on bladder scan and coats catheter was placed. Pt's CBC came back with Hgb 6.3. Primary team advised to do CXR prior to blood transfusion, and repeat CXR after blood transfusion to assess for fluid overload and to give additional lasix if CXR w/ congestion. Pt placed on TELE during RRT. Additional labetalol 10mg IVP was given and BP decreased to 150-160s systolic with improvement in pt's mental status. Pt was placed on BiPAP for increased WOB and lasix was given for suspected flash pulmonary edema after pt displayed that she is alert enough to take off the BiPAP mask if needed. Pt was also found with about 700 cc's on bladder scan and coats catheter to be placed to additionally assess for response to lasix 40 mg IV push. Pt's CBC came back with Hgb 6.3. Primary team advised to do CXR prior to blood transfusion, and repeat CXR after blood transfusion to assess for fluid overload and to give additional lasix if CXR w/ congestion. Pt placed on TELE during RRT. Pt w/ elevated lactate that is suspected to be iso current blast crisis and s/p treatment earlier during the day.  Additional labetalol 10mg IVP was given and BP decreased to 150-160s systolic with improvement in pt's mental status. Pt was placed on BiPAP for increased WOB and lasix was given for suspected flash pulmonary edema after pt displayed that she is alert enough to take off the BiPAP mask if needed. Pt was also found with about 700 cc's on bladder scan and coats catheter to be placed to additionally assess for response to lasix 40 mg IV push. Pt's CBC came back with Hgb 6.3. Primary team advised to do CXR prior to blood transfusion, and repeat CXR after blood transfusion to assess for fluid overload and to give additional lasix if CXR w/ congestion. Pt placed on TELE during RRT. Pt w/ elevated lactate that is suspected to be iso current blast crisis and s/p treatment earlier during the day. Primary team advised to do CT A/P if concern for bleed.

## 2025-01-13 NOTE — RAPID RESPONSE TEAM SUMMARY - NSADDTLFINDINGSRRT_GEN_ALL_CORE
On arrival, VS: 101.4F, /89, HR 94, RR 30, O2 sat 91% on 3L. On PE, pt was arousable but lethargic. Per primary nurse, pt has been progressively lethargic throughout her hospitalization. Pt received Hydral 10 mg IV push, ofirmev, and stat CTH was done to rule out a possibility of bleed. Labetalol IV push 10 mg was given for persistent hypertension. CTH without acute findings.  On arrival, VS: 101.4F, /89, HR 94, RR 30, O2 sat 91% on 3L. On PE, pt was arousable but lethargic. Per primary nurse, pt has been progressively lethargic throughout her hospitalization. Pt received Hydral 10 mg IV push, ofirmev, and stat CTH was done to rule out a possibility of bleed iso pt's mental status. Labetalol IV push 10 mg was given for persistent hypertension. CTH without acute findings.

## 2025-01-13 NOTE — PROVIDER CONTACT NOTE (CRITICAL VALUE NOTIFICATION) - SITUATION
Active RRT
active RRT
lactate 7.2, glucose 452, h/h 6.3/19
.87. Pt. denies distress or discomfort at this time
WBC= 138.50
Active RRT
pt WBC 95.33

## 2025-01-13 NOTE — PROGRESS NOTE ADULT - ASSESSMENT
74F with pmhx left side breast cancer (s/p lumpectomy, RT and chemo), colon cancer (s/p resection), CAD (s/p CABG x2), DM2, gout, HTN, HLD who presented initially to [facility] with lower back pain and transferred to [facility] for hyperleukocytosis with reported blasts concerning for newly diagnosed acute myeloid leukemia. Patient presenting with .33 and blast 69% noted on manual differential. Cycle 1 dacogen started on 1/12, mylotarg given on 1/13.

## 2025-01-13 NOTE — PROGRESS NOTE ADULT - SUBJECTIVE AND OBJECTIVE BOX
[facility] - Blessing Moore, Jewel Morales, Calos Ackerman  Office Number:  [phone]    Feeling incredibly short of breath this AM  Feels dehydrated and states she has no appetite  On 2L NC this morning   Feeling very anxious    ROS: negative unless otherwise mentioned.    MEDICATIONS  (STANDING):  allopurinol 300 milliGRAM(s) Oral daily  Biotene Dry Mouth Oral Rinse 15 milliLiter(s) Swish and Spit three times a day  cefepime   IVPB      cefepime   IVPB 2000 milliGRAM(s) IV Intermittent every 8 hours  chlorhexidine 2% Cloths 1 Application(s) Topical <User Schedule>  chlorhexidine 4% Liquid 1 Application(s) Topical <User Schedule>  decitabine IVPB (eMAR) 40 milliGRAM(s) IV Intermittent every 24 hours  hydrochlorothiazide 25 milliGRAM(s) Oral daily  hydroxyurea 2000 milliGRAM(s) Oral three times a day  insulin lispro (ADMELOG) corrective regimen sliding scale   SubCutaneous Before meals and at bedtime  lisinopril 20 milliGRAM(s) Oral daily  magnesium sulfate  IVPB 2 Gram(s) IV Intermittent once  metoprolol tartrate 50 milliGRAM(s) Oral two times a day  ondansetron Injectable 8 milliGRAM(s) IV Push every 24 hours  polyethylene glycol 3350 17 Gram(s) Oral daily  senna 2 Tablet(s) Oral at bedtime  sodium chloride 0.9%. 1000 milliLiter(s) (50 mL/Hr) IV Continuous <Continuous>  valACYclovir 500 milliGRAM(s) Oral every 12 hours    MEDICATIONS  (PRN):  oxyCODONE    IR 5 milliGRAM(s) Oral every 6 hours PRN Moderate Pain (4 - 6)  sodium chloride 0.9% lock flush 10 milliLiter(s) IV Push every 1 hour PRN Pre/post blood products, medications, blood draw, and to maintain line patency      Allergies    atenolol (Unknown)  penicillin (Hives)  sulfa drugs (Hives)  shellfish (Hives)    Intolerances        Vital Signs Last 24 Hrs  T(C): 38.1 (13 Jan 2025 08:50), Max: 38.2 (12 Jan 2025 16:10)  T(F): 100.6 (13 Jan 2025 08:50), Max: 100.8 (12 Jan 2025 16:10)  HR: 85 (13 Jan 2025 04:00) (85 - 99)  BP: 164/92 (13 Jan 2025 06:45) (163/79 - 215/87)  BP(mean): --  RR: 18 (13 Jan 2025 04:00) (18 - 22)  SpO2: 95% (13 Jan 2025 04:00) (95% - 98%)    Parameters below as of 13 Jan 2025 04:00  Patient On (Oxygen Delivery Method): nasal cannula  O2 Flow (L/min): 2      I&O's Summary    12 Jan 2025 07:01  -  13 Jan 2025 07:00  --------------------------------------------------------  IN: 1080 mL / OUT: 2600 mL / NET: -1520 mL        ON EXAM:    General: NAD, awake and alert, oriented x 3  HEENT: Mucous membranes are dry, anicteric  Lungs: Non-labored, breath sounds are clear bilaterally, No wheezing, rales or rhonchi  Cardiovascular: Regular, S1 and S2, no murmurs, rubs, or gallops  Gastrointestinal: Bowel Sounds present, soft, nontender.   Lymph: No peripheral edema. No lymphadenopathy.  Skin: No rashes or ulcers  Psych:  Mood & affect appropriate    LABS: All Labs Reviewed:                        6.4    127.91 )-----------( 14       ( 13 Jan 2025 06:43 )             20.3                         7.7    138.50 )-----------( 22       ( 12 Jan 2025 07:27 )             24.2                         8.2    123.87 )-----------( 32       ( 11 Jan 2025 10:49 )             24.9     13 Jan 2025 06:42    116    |  92     |  22     ----------------------------<  624    3.6     |  18     |  0.91   12 Jan 2025 18:59    130    |  91     |  20     ----------------------------<  232    3.4     |  23     |  0.93   12 Jan 2025 07:26    136    |  97     |  21     ----------------------------<  222    3.7     |  22     |  0.92     Ca    7.8        13 Jan 2025 06:42  Ca    9.5        12 Jan 2025 18:59  Ca    9.7        12 Jan 2025 07:26  Phos  2.7       13 Jan 2025 06:42  Phos  3.4       12 Jan 2025 18:59  Phos  4.0       12 Jan 2025 07:26  Mg     1.5       13 Jan 2025 06:42  Mg     1.6       12 Jan 2025 18:59  Mg     1.8       12 Jan 2025 07:26    TPro  7.9    /  Alb  3.5    /  TBili  0.8    /  DBili  x      /  AST  41     /  ALT  14     /  AlkPhos  78     13 Jan 2025 06:42  TPro  8.0    /  Alb  4.0    /  TBili  0.8    /  DBili  x      /  AST  42     /  ALT  19     /  AlkPhos  89     12 Jan 2025 18:59  TPro  7.8    /  Alb  3.9    /  TBili  0.7    /  DBili  x      /  AST  40     /  ALT  20     /  AlkPhos  79     12 Jan 2025 07:26    PT/INR - ( 12 Jan 2025 07:27 )   PT: 12.9 sec;   INR: 1.13 ratio         PTT - ( 12 Jan 2025 07:27 )  PTT:25.3 sec      Blood Culture: Organism --  Gram Stain Blood -- Gram Stain --  Specimen Source Clean Catch Clean Catch (Midstream)  Culture-Blood --    Organism --  Gram Stain Blood -- Gram Stain --  Specimen Source .Blood BLOOD  Culture-Blood --    Organism --  Gram Stain Blood -- Gram Stain --  Specimen Source .Blood BLOOD  Culture-Blood --        TTE W or WO Ultrasound Enhancing Agent (01.08.25 @ 11:11) >  CONCLUSIONS:   1. Left ventricular cavity is normal in size. Left ventricular wall thickness is normal. Left ventricular systolic function is normal with an ejection fraction visually estimated at 60 to 65 %. There are no regional wall motion abnormalities seen.

## 2025-01-13 NOTE — PROGRESS NOTE ADULT - NS ATTEND AMEND GEN_ALL_CORE FT
.  Primary      Vital Signs Last 24 Hrs  T(C): 36.8 (13 Jan 2025 04:00), Max: 38.2 (12 Jan 2025 16:10)  T(F): 98.2 (13 Jan 2025 04:00), Max: 100.8 (12 Jan 2025 16:10)  HR: 85 (13 Jan 2025 04:00) (82 - 99)  BP: 178/114 (13 Jan 2025 04:00) (163/79 - 215/87)  BP(mean): --  RR: 18 (13 Jan 2025 04:00) (18 - 22)  SpO2: 95% (13 Jan 2025 04:00) (95% - 98%)    Parameters below as of 13 Jan 2025 04:00  Patient On (Oxygen Delivery Method): nasal cannula  O2 Flow (L/min): 2    MEDICATIONS  (STANDING):  allopurinol 300 milliGRAM(s) Oral daily  Biotene Dry Mouth Oral Rinse 15 milliLiter(s) Swish and Spit three times a day  cefepime   IVPB      cefepime   IVPB 2000 milliGRAM(s) IV Intermittent every 8 hours  chlorhexidine 2% Cloths 1 Application(s) Topical <User Schedule>  chlorhexidine 4% Liquid 1 Application(s) Topical <User Schedule>  decitabine IVPB (eMAR) 40 milliGRAM(s) IV Intermittent every 24 hours  hydrochlorothiazide 25 milliGRAM(s) Oral daily  hydroxyurea 2000 milliGRAM(s) Oral three times a day  insulin lispro (ADMELOG) corrective regimen sliding scale   SubCutaneous Before meals and at bedtime  lisinopril 20 milliGRAM(s) Oral daily  metoprolol tartrate 50 milliGRAM(s) Oral two times a day  ondansetron Injectable 8 milliGRAM(s) IV Push every 24 hours  polyethylene glycol 3350 17 Gram(s) Oral daily  senna 2 Tablet(s) Oral at bedtime  sodium chloride 0.9%. 1000 milliLiter(s) (50 mL/Hr) IV Continuous <Continuous>      Assessment: 74 year old  day 2 cycle 1decitabine for treatment related FLT-3 + AML.  Course complicated by fever 9active)     Pmhx: left side breast cancer (s/p lumpectomy, RT and chemo), colon cancer (s/p resection), CAD (s/p CABG x2), DM2, gout, HTN, HLD     Heme:  HU 2 gram TID  Allopurinol  continue decitabine  Discuss GO during AM rounds.   Start venetoclax when WBC < 25,000  tumor lysis labs bid    ID: Cefepime (1/9- ), add valtrex.  Discuss posa during AM rounds: may start post GO.      Radiographs: please obtain dry chest CT    Nutrition: tolerating PO    DVT prophylaxis: ambulation.    Over 60 minutes were spent in direct patient care and care coordination. .  Primary: Goldberg      Vital Signs Last 24 Hrs  T(C): 36.8 (13 Jan 2025 04:00), Max: 38.2 (12 Jan 2025 16:10)  T(F): 98.2 (13 Jan 2025 04:00), Max: 100.8 (12 Jan 2025 16:10)  HR: 85 (13 Jan 2025 04:00) (82 - 99)  BP: 178/114 (13 Jan 2025 04:00) (163/79 - 215/87)  BP(mean): --  RR: 18 (13 Jan 2025 04:00) (18 - 22)  SpO2: 95% (13 Jan 2025 04:00) (95% - 98%)    Parameters below as of 13 Jan 2025 04:00  Patient On (Oxygen Delivery Method): nasal cannula  O2 Flow (L/min): 2    MEDICATIONS  (STANDING):  allopurinol 300 milliGRAM(s) Oral daily  Biotene Dry Mouth Oral Rinse 15 milliLiter(s) Swish and Spit three times a day  cefepime   IVPB      cefepime   IVPB 2000 milliGRAM(s) IV Intermittent every 8 hours  chlorhexidine 2% Cloths 1 Application(s) Topical <User Schedule>  chlorhexidine 4% Liquid 1 Application(s) Topical <User Schedule>  decitabine IVPB (eMAR) 40 milliGRAM(s) IV Intermittent every 24 hours  hydrochlorothiazide 25 milliGRAM(s) Oral daily  hydroxyurea 2000 milliGRAM(s) Oral three times a day  insulin lispro (ADMELOG) corrective regimen sliding scale   SubCutaneous Before meals and at bedtime  lisinopril 20 milliGRAM(s) Oral daily  metoprolol tartrate 50 milliGRAM(s) Oral two times a day  ondansetron Injectable 8 milliGRAM(s) IV Push every 24 hours  polyethylene glycol 3350 17 Gram(s) Oral daily  senna 2 Tablet(s) Oral at bedtime  sodium chloride 0.9%. 1000 milliLiter(s) (50 mL/Hr) IV Continuous <Continuous>      Assessment: 74 year old  day 2 cycle 1decitabine for treatment related FLT-3 + AML.  Course complicated by fever (active)     Pmhx: left side breast cancer (s/p lumpectomy, RT and chemo), colon cancer (s/p resection), CAD (s/p CABG x2), DM2, gout, HTN, HLD     Heme:  HU 2 gram TID  Allopurinol  continue decitabine  Discuss GO during AM rounds.   Start venetoclax when WBC < 25,000  tumor lysis labs bid    ID: Cefepime (1/9- ), add valtrex.  Discussed posa during AM rounds: may start post GO.      Radiographs: please obtain dry chest CT    Nutrition: tolerating PO    DVT prophylaxis: ambulation.    Over 60 minutes were spent in direct patient care and care coordination.

## 2025-01-13 NOTE — PROVIDER CONTACT NOTE (OTHER) - ACTION/TREATMENT ORDERED:
Provider to patient bedside, ok to continue DACOGEN, transfuse 1 unit of platelets. Give Dacogen first, platelets, mylotarg, then PRBCs.

## 2025-01-13 NOTE — PROGRESS NOTE ADULT - PROBLEM SELECTOR PLAN 1
1/8/25 TM interpretation: Abnormal myeloblasts (93%), consistent w/ acute myeloid leukemia. FISH for PML-SOULEYMANE is negative. The overall findings are consistent with AML.    Monitor CBC w dif, CMP, TLS labs - replete electrolytes and transfuse blood products PRN  Consider rasburicase 3mg IV x 1 if uric acid >8, and 6mg IV x 1 if uric acid >12  IVF, daily weights, strict Is/Os, diuresis PRN  Mouth care, Antiemetics Continue allopurinol 300mg QD  S/p leukophoresis x2 on 1/8 and 1/9 1/8 BM bx done. fu flow  1/10 Hydrea increased to 2000mg TID, Research RN meeting w/ pt to discuss study.  1/11 PICC ordered. WBC increasing, monitor on hydrea TID  1/12 Dacogen 20mg/m2 started- Venetoclax to start once WBC decrease.   1/13 Mylotarg given 1/8/25 TM interpretation: Abnormal myeloblasts (93%), consistent w/ acute myeloid leukemia. FISH for PML-SOULEYMANE is negative. The overall findings are consistent with AML.    Monitor CBC w dif, CMP, TLS labs - replete electrolytes and transfuse blood products PRN  Consider rasburicase 3mg IV x 1 if uric acid >8, and 6mg IV x 1 if uric acid >12  IVF, daily weights, strict Is/Os, diuresis PRN  Mouth care, Antiemetics Continue allopurinol 300mg QD  S/p leukophoresis x2 on 1/8 and 1/9 1/8 BM bx: diffuse involvement by AML  1/10 Hydrea increased to 2000mg TID, Research RN meeting w/ pt to discuss study.  1/11 PICC ordered. WBC increasing, monitor on hydrea TID  1/12 Dacogen 20mg/m2 started- Venetoclax to start once WBC decrease.   1/13 Mylotarg given 1/8/25 TM interpretation: Abnormal myeloblasts (93%), consistent w/ acute myeloid leukemia. FISH for PML-SOULEYMANE is negative. The overall findings are consistent with AML.    Monitor CBC w dif, CMP, TLS labs - replete electrolytes and transfuse blood products PRN  Consider rasburicase 3mg IV x 1 if uric acid >8, and 6mg IV x 1 if uric acid >12  IVF, daily weights, strict Is/Os, diuresis PRN  Mouth care, Antiemetics Continue allopurinol 300mg QD  S/p leukophoresis x2 on 1/8 and 1/9 1/8 BM bx: diffuse involvement by AML  1/10 Hydrea increased to 2000mg TID, Research RN meeting w/ pt to discuss study.  1/11 PICC ordered. WBC increasing, monitor on hydrea TID  1/12 Dacogen 20mg/m2 started- Venetoclax to start once WBC decrease.   1/13 Mylotarg given. Lasix 40mg IV x1 for increased weight.

## 2025-01-14 NOTE — CHART NOTE - NSCHARTNOTEFT_GEN_A_CORE
MICU ACCEPT NOTE    CHIEF COMPLAINT: Patient is a 74y old  Female who presents with a chief complaint of AML (12 Jan 2025 09:43)      HPI:  75 y/o F with PMHx breast cancer s/p lumpectomy, RT and chemo in 1999 w/ local recurrence in 2023 s/p resection; colon ca s/p resection in 2021, CAD s/p CABG x2 in 2010, HTN, HLD and T2DM who initially presented to [facility] for lethargy x 1 month w/ exertional SOB, acute on chronic low back pain and new b/l leg pain. She was found to have  w/ 69% blasts, transferred to [facility] for hematology evaluation of new acute leukemia and blast crisis.     Brief ED course:  VS: T 98.2, HR 86, /79, RR 16 spO2 94% on RA.  Labs: .33 1/ 69% blasts, Hgb 9.5, plt 43. K 2.6, trop 143.6, pro-BNP 1015  Imaging:CT lumbar spine showing L5-S1 diffuse disc bulging contributes to high-grade bilateral foraminal compromise without significant central canal compromise    Pt seen and evaluated by heme/onc in the ED, given allopurinol 300mg x1, rasburicase 3mg IV x1, hydroxyurea 200mg PO x1, 10 mEq K repletion. Pt was accepted directly to MICU for emergent leukapheresis.  (07 Jan 2025 21:38)      Hospital Course: Patient was downgraded to floor to receive chemotherapy treatment, when there was an increasing concern for TLS, and respiratory distress requiring BIPAP. Heme/onc following, recommending to continue chemotherapy in the hopes of helping symptoms.     PAST MEDICAL & SURGICAL HISTORY:  Colon Cancer      Breast Ca  (L)      HTN (Hypertension)      Hyperlipidemia      Coronary artery disease      Diabetes mellitus      Vertigo      Renal insufficiency  per pt      History of Colon Resection  2001      S/P Breast Lumpectomy  (L) 1999      S/P CABG x 2  2010      H/O forearm fracture  1995 (R)          FAMILY HISTORY:  FH: breast cancer  mother        SOCIAL HISTORY:  Smoking:   Substance Use:   EtOH Use:   Advance Directives:     MEDICATIONS  (STANDING):  allopurinol 300 milliGRAM(s) Oral daily  Biotene Dry Mouth Oral Rinse 15 milliLiter(s) Swish and Spit three times a day  carvedilol 6.25 milliGRAM(s) Oral every 12 hours  cefepime   IVPB 2000 milliGRAM(s) IV Intermittent every 8 hours  cefepime   IVPB      chlorhexidine 2% Cloths 1 Application(s) Topical <User Schedule>  chlorhexidine 4% Liquid 1 Application(s) Topical <User Schedule>  decitabine IVPB (eMAR) 40 milliGRAM(s) IV Intermittent every 24 hours  hydrochlorothiazide 25 milliGRAM(s) Oral daily  hydroxyurea 2000 milliGRAM(s) Oral three times a day  insulin lispro (ADMELOG) corrective regimen sliding scale   SubCutaneous Before meals and at bedtime  lisinopril 40 milliGRAM(s) Oral daily  ondansetron Injectable 8 milliGRAM(s) IV Push every 24 hours  polyethylene glycol 3350 17 Gram(s) Oral daily  senna 2 Tablet(s) Oral at bedtime  sodium chloride 0.9%. 1000 milliLiter(s) (50 mL/Hr) IV Continuous <Continuous>  valACYclovir 500 milliGRAM(s) Oral every 12 hours    MEDICATIONS  (PRN):  acetaminophen     Tablet .. 650 milliGRAM(s) Oral every 6 hours PRN Temp greater or equal to 38C (100.4F), Mild Pain (1 - 3)  oxyCODONE    IR 5 milliGRAM(s) Oral every 6 hours PRN Moderate Pain (4 - 6)  sodium chloride 0.9% lock flush 10 milliLiter(s) IV Push every 1 hour PRN Pre/post blood products, medications, blood draw, and to maintain line patency      Allergies    atenolol (Unknown)  penicillin (Hives)  sulfa drugs (Hives)  shellfish (Hives)    Intolerances        REVIEW OF SYSTEMS:  CONSTITUTIONAL: No weakness, fevers or chills  EYES/ENT: No visual changes;  No vertigo or throat pain   NECK: No pain or stiffness  MUSCULOSKELETAL: + Back pain  RESPIRATORY: No cough, wheezing, hemoptysis; No shortness of breath  CARDIOVASCULAR: No chest pain or palpitations  GASTROINTESTINAL: No abdominal or epigastric pain. No nausea, vomiting, or hematemesis; No diarrhea or constipation. No melena or hematochezia.  GENITOURINARY: No dysuria, frequency or hematuria  NEUROLOGICAL: No numbness or weakness  SKIN: No itching, rashes  [X] All other systems negative      OBJECTIVE:  ICU Vital Signs Last 24 Hrs  T(C): 37.2 (13 Jan 2025 22:33), Max: 38.6 (13 Jan 2025 13:30)  T(F): 98.9 (13 Jan 2025 22:33), Max: 101.5 (13 Jan 2025 17:15)  HR: 74 (13 Jan 2025 22:33) (74 - 106)  BP: 163/76 (13 Jan 2025 22:33) (126/54 - 190/91)  BP(mean): --  ABP: --  ABP(mean): --  RR: 42 (13 Jan 2025 22:33) (18 - 56)  SpO2: 99% (13 Jan 2025 22:33) (94% - 100%)    O2 Parameters below as of 13 Jan 2025 22:33  Patient On (Oxygen Delivery Method): BiPAP/CPAP              01-12 @ 07:01 - 01-13 @ 07:00  --------------------------------------------------------  IN: 1080 mL / OUT: 2600 mL / NET: -1520 mL    01-13 @ 07:01  -  01-14 @ 00:35  --------------------------------------------------------  IN: 120 mL / OUT: 0 mL / NET: 120 mL      CAPILLARY BLOOD GLUCOSE      POCT Blood Glucose.: 370 mg/dL (13 Jan 2025 21:44)      PHYSICAL EXAM:  GENERAL: NAD  HEAD:  Atraumatic, normocephalic  EYES: EOMI, PERRLA, conjunctiva and sclera clear  ENT: Moist mucous membranes  NECK: Supple, no JVD  HEART: S1, S2, regular rate and rhythm, no murmurs, rubs, or gallops  LUNGS: Unlabored respirations.  Clear to auscultation bilaterally, no crackles, wheezing, or rhonchi  ABDOMEN: Soft, mildly tender to epigastric region, nondistended, +BS  EXTREMITIES: 2+ peripheral pulses bilaterally. No clubbing, cyanosis, or edema  NERVOUS SYSTEM:  A&Ox3, no focal deficits   SKIN: No rashes or lesions  LINES:     LABS:                        5.7    142.95 )-----------( 75       ( 13 Jan 2025 19:32 )             17.1     Hgb Trend: 5.7<--, 6.4<--, 7.7<--, 8.2<--, 8.2<--  01-13    123[L]  |  87[L]  |  38[H]  ----------------------------<  326[H]  4.5   |  18[L]  |  1.37[H]    Ca    8.3[L]      13 Jan 2025 19:32  Phos  3.1     01-13  Mg     2.0     01-13    TPro  7.0  /  Alb  3.6  /  TBili  1.0  /  DBili  x   /  AST  107[H]  /  ALT  19  /  AlkPhos  287[H]  01-13    Creatinine Trend: 1.37<--, 1.11<--, 0.91<--, 0.93<--, 0.92<--, 1.05<--  PT/INR - ( 13 Jan 2025 09:45 )   PT: 16.1 sec;   INR: 1.40 ratio         PTT - ( 13 Jan 2025 09:45 )  PTT:24.2 sec  Urinalysis Basic - ( 13 Jan 2025 19:32 )    Color: x / Appearance: x / SG: x / pH: x  Gluc: 326 mg/dL / Ketone: x  / Bili: x / Urobili: x   Blood: x / Protein: x / Nitrite: x   Leuk Esterase: x / RBC: x / WBC x   Sq Epi: x / Non Sq Epi: x / Bacteria: x      Arterial Blood Gas:  01-13 @ 19:50  7.40/31/121/19/98.8/-5.1  ABG lactate: --    Venous Blood Gas:  01-13 @ 22:46  7.28/50/38/24/56.6  VBG Lactate: 7.2  Venous Blood Gas:  01-13 @ 19:24  7.32/42/29/22/47.1  VBG Lactate: 6.9      MICROBIOLOGY:     RADIOLOGY & ADDITIONAL TESTS:    NOEMÍ PADILLA is a 74y female with PMH breast cancer s/p lumpectomy, RT and chemo in 1999 w/ local recurrence in 2023 s/p resection; colon ca s/p resection in 2021, CAD s/p CABG x2 in 2010, HTN, HLD and T2DM in the hospital for 7d transferred to the MICU for ***.    PLAN  =====Neurologic=====  AOx4,  no active issues    =====Pulmonary=====  #Respiratory distress  - Patient with increased work of breathing   - Placed on BIPAP   - Wean off BIPAP as tolerated     =====Cardiovascular=====  No active issues     =====GI=====  #GERD  - Protonix 40mg IV QD    #Diet      =====Renal/=====  #Electrolytes  - Maintain K>4, Phos>3, Mag>2, iCal>1    =====Endocrine=====  #DM2  - While NPO, FSBG and SRINI q6h    =====Infectious Disease=====    =====Heme/Onc=====  #DVT Ppx  -     =====Ethics=====  FULL CODE MICU ACCEPT NOTE    CHIEF COMPLAINT: Patient is a 74y old  Female who presents with a chief complaint of AML (12 Jan 2025 09:43)      HPI:  75 y/o F with PMHx breast cancer s/p lumpectomy, RT and chemo in 1999 w/ local recurrence in 2023 s/p resection; colon ca s/p resection in 2021, CAD s/p CABG x2 in 2010, HTN, HLD and T2DM who initially presented to [place] for lethargy x 1 month w/ exertional SOB, acute on chronic low back pain and new b/l leg pain. She was found to have  w/ 69% blasts, transferred to [facility] for hematology evaluation of new acute leukemia and blast crisis.     Brief ED course:  VS: T 98.2, HR 86, /79, RR 16 spO2 94% on RA.  Labs: .33 1/ 69% blasts, Hgb 9.5, plt 43. K 2.6, trop 143.6, pro-BNP 1015  Imaging:CT lumbar spine showing L5-S1 diffuse disc bulging contributes to high-grade bilateral foraminal compromise without significant central canal compromise    Pt seen and evaluated by heme/onc in the ED, given allopurinol 300mg x1, rasburicase 3mg IV x1, hydroxyurea 200mg PO x1, 10 mEq K repletion. Pt was accepted directly to MICU for emergent leukapheresis.  (07 Jan 2025 21:38)      Hospital Course: Patient was downgraded to floor to receive chemotherapy treatment, when there was an increasing concern for TLS, and respiratory distress requiring BIPAP. Heme/onc following, recommending to continue chemotherapy in the hopes of helping symptoms.     PAST MEDICAL & SURGICAL HISTORY:  Colon Cancer      Breast Ca  (L)      HTN (Hypertension)      Hyperlipidemia      Coronary artery disease      Diabetes mellitus      Vertigo      Renal insufficiency  per pt      History of Colon Resection  2001      S/P Breast Lumpectomy  (L) 1999      S/P CABG x 2  2010      H/O forearm fracture  1995 (R)          FAMILY HISTORY:  FH: breast cancer  mother        SOCIAL HISTORY:  Smoking:   Substance Use:   EtOH Use:   Advance Directives:     MEDICATIONS  (STANDING):  allopurinol 300 milliGRAM(s) Oral daily  Biotene Dry Mouth Oral Rinse 15 milliLiter(s) Swish and Spit three times a day  carvedilol 6.25 milliGRAM(s) Oral every 12 hours  cefepime   IVPB 2000 milliGRAM(s) IV Intermittent every 8 hours  cefepime   IVPB      chlorhexidine 2% Cloths 1 Application(s) Topical <User Schedule>  chlorhexidine 4% Liquid 1 Application(s) Topical <User Schedule>  decitabine IVPB (eMAR) 40 milliGRAM(s) IV Intermittent every 24 hours  hydrochlorothiazide 25 milliGRAM(s) Oral daily  hydroxyurea 2000 milliGRAM(s) Oral three times a day  insulin lispro (ADMELOG) corrective regimen sliding scale   SubCutaneous Before meals and at bedtime  lisinopril 40 milliGRAM(s) Oral daily  ondansetron Injectable 8 milliGRAM(s) IV Push every 24 hours  polyethylene glycol 3350 17 Gram(s) Oral daily  senna 2 Tablet(s) Oral at bedtime  sodium chloride 0.9%. 1000 milliLiter(s) (50 mL/Hr) IV Continuous <Continuous>  valACYclovir 500 milliGRAM(s) Oral every 12 hours    MEDICATIONS  (PRN):  acetaminophen     Tablet .. 650 milliGRAM(s) Oral every 6 hours PRN Temp greater or equal to 38C (100.4F), Mild Pain (1 - 3)  oxyCODONE    IR 5 milliGRAM(s) Oral every 6 hours PRN Moderate Pain (4 - 6)  sodium chloride 0.9% lock flush 10 milliLiter(s) IV Push every 1 hour PRN Pre/post blood products, medications, blood draw, and to maintain line patency      Allergies    atenolol (Unknown)  penicillin (Hives)  sulfa drugs (Hives)  shellfish (Hives)    Intolerances        REVIEW OF SYSTEMS:  CONSTITUTIONAL: No weakness, fevers or chills  EYES/ENT: No visual changes;  No vertigo or throat pain   NECK: No pain or stiffness  MUSCULOSKELETAL: + Back pain  RESPIRATORY: No cough, wheezing, hemoptysis; No shortness of breath  CARDIOVASCULAR: No chest pain or palpitations  GASTROINTESTINAL: No abdominal or epigastric pain. No nausea, vomiting, or hematemesis; No diarrhea or constipation. No melena or hematochezia.  GENITOURINARY: No dysuria, frequency or hematuria  NEUROLOGICAL: No numbness or weakness  SKIN: No itching, rashes  [X] All other systems negative      OBJECTIVE:  ICU Vital Signs Last 24 Hrs  T(C): 37.2 (13 Jan 2025 22:33), Max: 38.6 (13 Jan 2025 13:30)  T(F): 98.9 (13 Jan 2025 22:33), Max: 101.5 (13 Jan 2025 17:15)  HR: 74 (13 Jan 2025 22:33) (74 - 106)  BP: 163/76 (13 Jan 2025 22:33) (126/54 - 190/91)  BP(mean): --  ABP: --  ABP(mean): --  RR: 42 (13 Jan 2025 22:33) (18 - 56)  SpO2: 99% (13 Jan 2025 22:33) (94% - 100%)    O2 Parameters below as of 13 Jan 2025 22:33  Patient On (Oxygen Delivery Method): BiPAP/CPAP              01-12 @ 07:01 - 01-13 @ 07:00  --------------------------------------------------------  IN: 1080 mL / OUT: 2600 mL / NET: -1520 mL    01-13 @ 07:01  -  01-14 @ 00:35  --------------------------------------------------------  IN: 120 mL / OUT: 0 mL / NET: 120 mL      CAPILLARY BLOOD GLUCOSE      POCT Blood Glucose.: 370 mg/dL (13 Jan 2025 21:44)      PHYSICAL EXAM:  GENERAL: NAD  HEAD:  Atraumatic, normocephalic  EYES: EOMI, PERRLA, conjunctiva and sclera clear  ENT: Moist mucous membranes  NECK: Supple, no JVD  HEART: S1, S2, regular rate and rhythm, no murmurs, rubs, or gallops  LUNGS: Unlabored respirations.  Clear to auscultation bilaterally, no crackles, wheezing, or rhonchi  ABDOMEN: Soft, mildly tender to epigastric region, nondistended, +BS  EXTREMITIES: 2+ peripheral pulses bilaterally. No clubbing, cyanosis, or edema  NERVOUS SYSTEM:  A&Ox3, no focal deficits   SKIN: No rashes or lesions  LINES:     LABS:                        5.7    142.95 )-----------( 75       ( 13 Jan 2025 19:32 )             17.1     Hgb Trend: 5.7<--, 6.4<--, 7.7<--, 8.2<--, 8.2<--  01-13    123[L]  |  87[L]  |  38[H]  ----------------------------<  326[H]  4.5   |  18[L]  |  1.37[H]    Ca    8.3[L]      13 Jan 2025 19:32  Phos  3.1     01-13  Mg     2.0     01-13    TPro  7.0  /  Alb  3.6  /  TBili  1.0  /  DBili  x   /  AST  107[H]  /  ALT  19  /  AlkPhos  287[H]  01-13    Creatinine Trend: 1.37<--, 1.11<--, 0.91<--, 0.93<--, 0.92<--, 1.05<--  PT/INR - ( 13 Jan 2025 09:45 )   PT: 16.1 sec;   INR: 1.40 ratio         PTT - ( 13 Jan 2025 09:45 )  PTT:24.2 sec  Urinalysis Basic - ( 13 Jan 2025 19:32 )    Color: x / Appearance: x / SG: x / pH: x  Gluc: 326 mg/dL / Ketone: x  / Bili: x / Urobili: x   Blood: x / Protein: x / Nitrite: x   Leuk Esterase: x / RBC: x / WBC x   Sq Epi: x / Non Sq Epi: x / Bacteria: x      Arterial Blood Gas:  01-13 @ 19:50  7.40/31/121/19/98.8/-5.1  ABG lactate: --    Venous Blood Gas:  01-13 @ 22:46  7.28/50/38/24/56.6  VBG Lactate: 7.2  Venous Blood Gas:  01-13 @ 19:24  7.32/42/29/22/47.1  VBG Lactate: 6.9      MICROBIOLOGY:     RADIOLOGY & ADDITIONAL TESTS:    NOEMÍ PADILLA is a 74y female with PMH breast cancer s/p lumpectomy, RT and chemo in 1999 w/ local recurrence in 2023 s/p resection; colon ca s/p resection in 2021, CAD s/p CABG x2 in 2010, HTN, HLD and T2DM in the hospital for 7d transferred to the MICU for respiratory distress    PLAN  =====Neurologic=====  AOx4,  no active issues    =====Pulmonary=====  #Respiratory distress  - Patient with increased work of breathing   - Placed on BIPAP   - Wean off BIPAP as tolerated     =====Cardiovascular=====  #Hypertension  - Patient has history of hypertension   - C/w home meds     =====GI=====  #GERD  - Protonix 40mg IV QD    #Diet      =====Renal/=====      #Electrolytes  - Maintain K>4, Phos>3, Mag>2, iCal>1    =====Endocrine=====  #DM2  - While NPO, FSBG and SRINI q6h    =====Infectious Disease=====    =====Heme/Onc=====  #AML - Blastic crisis   -     =====Ethics=====  FULL CODE MICU ACCEPT NOTE    CHIEF COMPLAINT: Patient is a 74y old  Female who presents with a chief complaint of AML (12 Jan 2025 09:43)      HPI:  73 y/o F with PMHx breast cancer s/p lumpectomy, RT and chemo in 1999 w/ local recurrence in 2023 s/p resection; colon ca s/p resection in 2021, CAD s/p CABG x2 in 2010, HTN, HLD and T2DM who initially presented to [place] for lethargy x 1 month w/ exertional SOB, acute on chronic low back pain and new b/l leg pain. She was found to have  w/ 69% blasts, transferred to [facility] for hematology evaluation of new acute leukemia and blast crisis.     Brief ED course:  VS: T 98.2, HR 86, /79, RR 16 spO2 94% on RA.  Labs: .33 1/ 69% blasts, Hgb 9.5, plt 43. K 2.6, trop 143.6, pro-BNP 1015  Imaging:CT lumbar spine showing L5-S1 diffuse disc bulging contributes to high-grade bilateral foraminal compromise without significant central canal compromise    Pt seen and evaluated by heme/onc in the ED, given allopurinol 300mg x1, rasburicase 3mg IV x1, hydroxyurea 200mg PO x1, 10 mEq K repletion. Pt was accepted directly to MICU for emergent leukapheresis.  (07 Jan 2025 21:38)      Hospital Course: Patient was downgraded to floor to receive chemotherapy treatment, when there was an increasing concern for TLS, and respiratory distress requiring BIPAP. Heme/onc following, recommending to continue chemotherapy in the hopes of helping symptoms.     PAST MEDICAL & SURGICAL HISTORY:  Colon Cancer      Breast Ca  (L)      HTN (Hypertension)      Hyperlipidemia      Coronary artery disease      Diabetes mellitus      Vertigo      Renal insufficiency  per pt      History of Colon Resection  2001      S/P Breast Lumpectomy  (L) 1999      S/P CABG x 2  2010      H/O forearm fracture  1995 (R)          FAMILY HISTORY:  FH: breast cancer  mother        SOCIAL HISTORY:  Smoking:   Substance Use:   EtOH Use:   Advance Directives:     MEDICATIONS  (STANDING):  allopurinol 300 milliGRAM(s) Oral daily  Biotene Dry Mouth Oral Rinse 15 milliLiter(s) Swish and Spit three times a day  carvedilol 6.25 milliGRAM(s) Oral every 12 hours  cefepime   IVPB 2000 milliGRAM(s) IV Intermittent every 8 hours  cefepime   IVPB      chlorhexidine 2% Cloths 1 Application(s) Topical <User Schedule>  chlorhexidine 4% Liquid 1 Application(s) Topical <User Schedule>  decitabine IVPB (eMAR) 40 milliGRAM(s) IV Intermittent every 24 hours  hydrochlorothiazide 25 milliGRAM(s) Oral daily  hydroxyurea 2000 milliGRAM(s) Oral three times a day  insulin lispro (ADMELOG) corrective regimen sliding scale   SubCutaneous Before meals and at bedtime  lisinopril 40 milliGRAM(s) Oral daily  ondansetron Injectable 8 milliGRAM(s) IV Push every 24 hours  polyethylene glycol 3350 17 Gram(s) Oral daily  senna 2 Tablet(s) Oral at bedtime  sodium chloride 0.9%. 1000 milliLiter(s) (50 mL/Hr) IV Continuous <Continuous>  valACYclovir 500 milliGRAM(s) Oral every 12 hours    MEDICATIONS  (PRN):  acetaminophen     Tablet .. 650 milliGRAM(s) Oral every 6 hours PRN Temp greater or equal to 38C (100.4F), Mild Pain (1 - 3)  oxyCODONE    IR 5 milliGRAM(s) Oral every 6 hours PRN Moderate Pain (4 - 6)  sodium chloride 0.9% lock flush 10 milliLiter(s) IV Push every 1 hour PRN Pre/post blood products, medications, blood draw, and to maintain line patency      Allergies    atenolol (Unknown)  penicillin (Hives)  sulfa drugs (Hives)  shellfish (Hives)    Intolerances        REVIEW OF SYSTEMS:  CONSTITUTIONAL: No weakness, fevers or chills  EYES/ENT: No visual changes;  No vertigo or throat pain   NECK: No pain or stiffness  MUSCULOSKELETAL: + Back pain  RESPIRATORY: No cough, wheezing, hemoptysis; No shortness of breath  CARDIOVASCULAR: No chest pain or palpitations  GASTROINTESTINAL: No abdominal or epigastric pain. No nausea, vomiting, or hematemesis; No diarrhea or constipation. No melena or hematochezia.  GENITOURINARY: No dysuria, frequency or hematuria  NEUROLOGICAL: No numbness or weakness  SKIN: No itching, rashes  [X] All other systems negative      OBJECTIVE:  ICU Vital Signs Last 24 Hrs  T(C): 37.2 (13 Jan 2025 22:33), Max: 38.6 (13 Jan 2025 13:30)  T(F): 98.9 (13 Jan 2025 22:33), Max: 101.5 (13 Jan 2025 17:15)  HR: 74 (13 Jan 2025 22:33) (74 - 106)  BP: 163/76 (13 Jan 2025 22:33) (126/54 - 190/91)  BP(mean): --  ABP: --  ABP(mean): --  RR: 42 (13 Jan 2025 22:33) (18 - 56)  SpO2: 99% (13 Jan 2025 22:33) (94% - 100%)    O2 Parameters below as of 13 Jan 2025 22:33  Patient On (Oxygen Delivery Method): BiPAP/CPAP              01-12 @ 07:01 - 01-13 @ 07:00  --------------------------------------------------------  IN: 1080 mL / OUT: 2600 mL / NET: -1520 mL    01-13 @ 07:01  -  01-14 @ 00:35  --------------------------------------------------------  IN: 120 mL / OUT: 0 mL / NET: 120 mL      CAPILLARY BLOOD GLUCOSE      POCT Blood Glucose.: 370 mg/dL (13 Jan 2025 21:44)      PHYSICAL EXAM:  GENERAL: NAD  HEAD:  Atraumatic, normocephalic  EYES: EOMI, PERRLA, conjunctiva and sclera clear  ENT: Moist mucous membranes  NECK: Supple, no JVD  HEART: S1, S2, regular rate and rhythm, no murmurs, rubs, or gallops  LUNGS: Unlabored respirations.  Clear to auscultation bilaterally, no crackles, wheezing, or rhonchi  ABDOMEN: Soft, mildly tender to epigastric region, nondistended, +BS  EXTREMITIES: 2+ peripheral pulses bilaterally. No clubbing, cyanosis, or edema  NERVOUS SYSTEM:  A&Ox3, no focal deficits   SKIN: No rashes or lesions  LINES:     LABS:                        5.7    142.95 )-----------( 75       ( 13 Jan 2025 19:32 )             17.1     Hgb Trend: 5.7<--, 6.4<--, 7.7<--, 8.2<--, 8.2<--  01-13    123[L]  |  87[L]  |  38[H]  ----------------------------<  326[H]  4.5   |  18[L]  |  1.37[H]    Ca    8.3[L]      13 Jan 2025 19:32  Phos  3.1     01-13  Mg     2.0     01-13    TPro  7.0  /  Alb  3.6  /  TBili  1.0  /  DBili  x   /  AST  107[H]  /  ALT  19  /  AlkPhos  287[H]  01-13    Creatinine Trend: 1.37<--, 1.11<--, 0.91<--, 0.93<--, 0.92<--, 1.05<--  PT/INR - ( 13 Jan 2025 09:45 )   PT: 16.1 sec;   INR: 1.40 ratio         PTT - ( 13 Jan 2025 09:45 )  PTT:24.2 sec  Urinalysis Basic - ( 13 Jan 2025 19:32 )    Color: x / Appearance: x / SG: x / pH: x  Gluc: 326 mg/dL / Ketone: x  / Bili: x / Urobili: x   Blood: x / Protein: x / Nitrite: x   Leuk Esterase: x / RBC: x / WBC x   Sq Epi: x / Non Sq Epi: x / Bacteria: x      Arterial Blood Gas:  01-13 @ 19:50  7.40/31/121/19/98.8/-5.1  ABG lactate: --    Venous Blood Gas:  01-13 @ 22:46  7.28/50/38/24/56.6  VBG Lactate: 7.2  Venous Blood Gas:  01-13 @ 19:24  7.32/42/29/22/47.1  VBG Lactate: 6.9      MICROBIOLOGY:     RADIOLOGY & ADDITIONAL TESTS:    NOEMÍ PADILLA is a 74y female with PMH breast cancer s/p lumpectomy, RT and chemo in 1999 w/ local recurrence in 2023 s/p resection; colon ca s/p resection in 2021, CAD s/p CABG x2 in 2010, HTN, HLD and T2DM in the hospital for 7d transferred to the MICU for respiratory distress    PLAN  =====Neurologic=====  AOx4,  no active issues    =====Pulmonary=====  #Respiratory distress  - Patient with increased work of breathing   - Placed on BIPAP   - Wean off BIPAP as tolerated     =====Cardiovascular=====  #Hypertension  - Patient has history of hypertension   - C/w home meds     =====GI=====  #GERD  - Protonix 40mg IV QD    #Diet      =====Renal/=====      #Electrolytes  - Maintain K>4, Phos>3, Mag>2, iCal>1    =====Endocrine=====  #DM2  - While NPO, FSBG and SRINI q6h    =====Infectious Disease=====  Patient is febrile  - C/w cefepime     =====Heme/Onc=====  #Hemolytic anemia  - Labs consistent with DIC in the setting     #AML - Blastic crisis   -     =====Ethics=====  FULL CODE MICU ACCEPT NOTE    CHIEF COMPLAINT: Patient is a 74y old  Female who presents with a chief complaint of AML (12 Jan 2025 09:43)      HPI:  73 y/o F with PMHx breast cancer s/p lumpectomy, RT and chemo in 1999 w/ local recurrence in 2023 s/p resection; colon ca s/p resection in 2021, CAD s/p CABG x2 in 2010, HTN, HLD and T2DM who initially presented to [place] for lethargy x 1 month w/ exertional SOB, acute on chronic low back pain and new b/l leg pain. She was found to have  w/ 69% blasts, transferred to [facility] for hematology evaluation of new acute leukemia and blast crisis.     Brief ED course:  VS: T 98.2, HR 86, /79, RR 16 spO2 94% on RA.  Labs: .33 1/ 69% blasts, Hgb 9.5, plt 43. K 2.6, trop 143.6, pro-BNP 1015  Imaging:CT lumbar spine showing L5-S1 diffuse disc bulging contributes to high-grade bilateral foraminal compromise without significant central canal compromise    Pt seen and evaluated by heme/onc in the ED, given allopurinol 300mg x1, rasburicase 3mg IV x1, hydroxyurea 200mg PO x1, 10 mEq K repletion. Pt was accepted directly to MICU for emergent leukapheresis.  (07 Jan 2025 21:38)      Hospital Course: Patient was downgraded to floor to receive chemotherapy treatment, when there was an increasing concern for TLS, and respiratory distress requiring BIPAP. Heme/onc following, recommending to continue chemotherapy in the hopes of helping symptoms.     PAST MEDICAL & SURGICAL HISTORY:  Colon Cancer      Breast Ca  (L)      HTN (Hypertension)      Hyperlipidemia      Coronary artery disease      Diabetes mellitus      Vertigo      Renal insufficiency  per pt      History of Colon Resection  2001      S/P Breast Lumpectomy  (L) 1999      S/P CABG x 2  2010      H/O forearm fracture  1995 (R)          FAMILY HISTORY:  FH: breast cancer  mother        SOCIAL HISTORY:  Smoking:   Substance Use:   EtOH Use:   Advance Directives:     MEDICATIONS  (STANDING):  allopurinol 300 milliGRAM(s) Oral daily  Biotene Dry Mouth Oral Rinse 15 milliLiter(s) Swish and Spit three times a day  carvedilol 6.25 milliGRAM(s) Oral every 12 hours  cefepime   IVPB 2000 milliGRAM(s) IV Intermittent every 8 hours  cefepime   IVPB      chlorhexidine 2% Cloths 1 Application(s) Topical <User Schedule>  chlorhexidine 4% Liquid 1 Application(s) Topical <User Schedule>  decitabine IVPB (eMAR) 40 milliGRAM(s) IV Intermittent every 24 hours  hydrochlorothiazide 25 milliGRAM(s) Oral daily  hydroxyurea 2000 milliGRAM(s) Oral three times a day  insulin lispro (ADMELOG) corrective regimen sliding scale   SubCutaneous Before meals and at bedtime  lisinopril 40 milliGRAM(s) Oral daily  ondansetron Injectable 8 milliGRAM(s) IV Push every 24 hours  polyethylene glycol 3350 17 Gram(s) Oral daily  senna 2 Tablet(s) Oral at bedtime  sodium chloride 0.9%. 1000 milliLiter(s) (50 mL/Hr) IV Continuous <Continuous>  valACYclovir 500 milliGRAM(s) Oral every 12 hours    MEDICATIONS  (PRN):  acetaminophen     Tablet .. 650 milliGRAM(s) Oral every 6 hours PRN Temp greater or equal to 38C (100.4F), Mild Pain (1 - 3)  oxyCODONE    IR 5 milliGRAM(s) Oral every 6 hours PRN Moderate Pain (4 - 6)  sodium chloride 0.9% lock flush 10 milliLiter(s) IV Push every 1 hour PRN Pre/post blood products, medications, blood draw, and to maintain line patency      Allergies    atenolol (Unknown)  penicillin (Hives)  sulfa drugs (Hives)  shellfish (Hives)    Intolerances        REVIEW OF SYSTEMS:  CONSTITUTIONAL: No weakness, fevers or chills  EYES/ENT: No visual changes;  No vertigo or throat pain   NECK: No pain or stiffness  MUSCULOSKELETAL: + Back pain  RESPIRATORY: No cough, wheezing, hemoptysis; No shortness of breath  CARDIOVASCULAR: No chest pain or palpitations  GASTROINTESTINAL: No abdominal or epigastric pain. No nausea, vomiting, or hematemesis; No diarrhea or constipation. No melena or hematochezia.  GENITOURINARY: No dysuria, frequency or hematuria  NEUROLOGICAL: No numbness or weakness  SKIN: No itching, rashes  [X] All other systems negative      OBJECTIVE:  ICU Vital Signs Last 24 Hrs  T(C): 37.2 (13 Jan 2025 22:33), Max: 38.6 (13 Jan 2025 13:30)  T(F): 98.9 (13 Jan 2025 22:33), Max: 101.5 (13 Jan 2025 17:15)  HR: 74 (13 Jan 2025 22:33) (74 - 106)  BP: 163/76 (13 Jan 2025 22:33) (126/54 - 190/91)  BP(mean): --  ABP: --  ABP(mean): --  RR: 42 (13 Jan 2025 22:33) (18 - 56)  SpO2: 99% (13 Jan 2025 22:33) (94% - 100%)    O2 Parameters below as of 13 Jan 2025 22:33  Patient On (Oxygen Delivery Method): BiPAP/CPAP              01-12 @ 07:01 - 01-13 @ 07:00  --------------------------------------------------------  IN: 1080 mL / OUT: 2600 mL / NET: -1520 mL    01-13 @ 07:01  -  01-14 @ 00:35  --------------------------------------------------------  IN: 120 mL / OUT: 0 mL / NET: 120 mL      CAPILLARY BLOOD GLUCOSE      POCT Blood Glucose.: 370 mg/dL (13 Jan 2025 21:44)      PHYSICAL EXAM:  GENERAL: NAD  HEAD:  Atraumatic, normocephalic  EYES: EOMI, PERRLA, conjunctiva and sclera clear  ENT: Moist mucous membranes  NECK: Supple, no JVD  HEART: S1, S2, regular rate and rhythm, no murmurs, rubs, or gallops  LUNGS: Unlabored respirations.  Clear to auscultation bilaterally, no crackles, wheezing, or rhonchi  ABDOMEN: Soft, mildly tender to epigastric region, nondistended, +BS  EXTREMITIES: 2+ peripheral pulses bilaterally. No clubbing, cyanosis, or edema  NERVOUS SYSTEM:  A&Ox3, no focal deficits   SKIN: No rashes or lesions  LINES:     LABS:                        5.7    142.95 )-----------( 75       ( 13 Jan 2025 19:32 )             17.1     Hgb Trend: 5.7<--, 6.4<--, 7.7<--, 8.2<--, 8.2<--  01-13    123[L]  |  87[L]  |  38[H]  ----------------------------<  326[H]  4.5   |  18[L]  |  1.37[H]    Ca    8.3[L]      13 Jan 2025 19:32  Phos  3.1     01-13  Mg     2.0     01-13    TPro  7.0  /  Alb  3.6  /  TBili  1.0  /  DBili  x   /  AST  107[H]  /  ALT  19  /  AlkPhos  287[H]  01-13    Creatinine Trend: 1.37<--, 1.11<--, 0.91<--, 0.93<--, 0.92<--, 1.05<--  PT/INR - ( 13 Jan 2025 09:45 )   PT: 16.1 sec;   INR: 1.40 ratio         PTT - ( 13 Jan 2025 09:45 )  PTT:24.2 sec  Urinalysis Basic - ( 13 Jan 2025 19:32 )    Color: x / Appearance: x / SG: x / pH: x  Gluc: 326 mg/dL / Ketone: x  / Bili: x / Urobili: x   Blood: x / Protein: x / Nitrite: x   Leuk Esterase: x / RBC: x / WBC x   Sq Epi: x / Non Sq Epi: x / Bacteria: x      Arterial Blood Gas:  01-13 @ 19:50  7.40/31/121/19/98.8/-5.1  ABG lactate: --    Venous Blood Gas:  01-13 @ 22:46  7.28/50/38/24/56.6  VBG Lactate: 7.2  Venous Blood Gas:  01-13 @ 19:24  7.32/42/29/22/47.1  VBG Lactate: 6.9      MICROBIOLOGY:     RADIOLOGY & ADDITIONAL TESTS:    NOEMÍ PADILLA is a 74y female with PMH breast cancer s/p lumpectomy, RT and chemo in 1999 w/ local recurrence in 2023 s/p resection; colon ca s/p resection in 2021, CAD s/p CABG x2 in 2010, HTN, HLD and T2DM in the hospital for 7d transferred to the MICU for respiratory distress    PLAN  =====Neurologic=====  AOx4,  mildly encephalopathic     =====Pulmonary=====  #Respiratory distress  - Patient with increased work of breathing   - Placed on BIPAP   - Wean off BIPAP as tolerated     =====Cardiovascular=====  #Hypertension  - Patient has history of hypertension   - C/w home meds     No requirement for pressors  =====GI=====      =====Renal/=====  #GODFREY  - Creatinine increase, likely multifactorial    #Electrolytes  - Maintain K>4, Phos>3, Mag>2, iCal>1    =====Endocrine=====  #DM2  - While NPO, FSBG and SRINI q6h    =====Infectious Disease=====  Patient is febrile  - C/w cefepime   - Cooling blanket     =====Heme/Onc=====  #Hemolytic anemia  - Labs consistent with DIC, elevated LDH, in the setting of blastic crisis   - Transfuse to goal of 7  -     #AML - Blastic crisis   - Heme/onc following   - F/u recs   - No leukophoresis indicated at this time   - No indication for TLS labs at this time as per heme/onc, revaluate in the morning     =====Ethics=====  FULL CODE MICU ACCEPT NOTE    CHIEF COMPLAINT: Patient is a 74y old  Female who presents with a chief complaint of AML (12 Jan 2025 09:43)      HPI:  75 y/o F with PMHx breast cancer s/p lumpectomy, RT and chemo in 1999 w/ local recurrence in 2023 s/p resection; colon ca s/p resection in 2021, CAD s/p CABG x2 in 2010, HTN, HLD and T2DM who initially presented to [facility] for lethargy x 1 month w/ exertional SOB, acute on chronic low back pain and new b/l leg pain. She was found to have  w/ 69% blasts, transferred to [facility] for hematology evaluation of new acute leukemia and blast crisis.     Brief ED course:  VS: T 98.2, HR 86, /79, RR 16 spO2 94% on RA.  Labs: .33 1/ 69% blasts, Hgb 9.5, plt 43. K 2.6, trop 143.6, pro-BNP 1015  Imaging:CT lumbar spine showing L5-S1 diffuse disc bulging contributes to high-grade bilateral foraminal compromise without significant central canal compromise    Pt seen and evaluated by heme/onc in the ED, given allopurinol 300mg x1, rasburicase 3mg IV x1, hydroxyurea 200mg PO x1, 10 mEq K repletion. Pt was accepted directly to MICU for emergent leukapheresis.  (07 Jan 2025 21:38)      Hospital Course: Patient was downgraded to floor to receive chemotherapy treatment, when there was an increasing concern for TLS, and respiratory distress requiring BIPAP. Heme/onc following, recommending to continue chemotherapy in the hopes of helping symptoms.     PAST MEDICAL & SURGICAL HISTORY:  Colon Cancer      Breast Ca  (L)      HTN (Hypertension)      Hyperlipidemia      Coronary artery disease      Diabetes mellitus      Vertigo      Renal insufficiency  per pt      History of Colon Resection  2001      S/P Breast Lumpectomy  (L) 1999      S/P CABG x 2  2010      H/O forearm fracture  1995 (R)          FAMILY HISTORY:  FH: breast cancer  mother        SOCIAL HISTORY:  Smoking:   Substance Use:   EtOH Use:   Advance Directives:     MEDICATIONS  (STANDING):  allopurinol 300 milliGRAM(s) Oral daily  Biotene Dry Mouth Oral Rinse 15 milliLiter(s) Swish and Spit three times a day  carvedilol 6.25 milliGRAM(s) Oral every 12 hours  cefepime   IVPB 2000 milliGRAM(s) IV Intermittent every 8 hours  cefepime   IVPB      chlorhexidine 2% Cloths 1 Application(s) Topical <User Schedule>  chlorhexidine 4% Liquid 1 Application(s) Topical <User Schedule>  decitabine IVPB (eMAR) 40 milliGRAM(s) IV Intermittent every 24 hours  hydrochlorothiazide 25 milliGRAM(s) Oral daily  hydroxyurea 2000 milliGRAM(s) Oral three times a day  insulin lispro (ADMELOG) corrective regimen sliding scale   SubCutaneous Before meals and at bedtime  lisinopril 40 milliGRAM(s) Oral daily  ondansetron Injectable 8 milliGRAM(s) IV Push every 24 hours  polyethylene glycol 3350 17 Gram(s) Oral daily  senna 2 Tablet(s) Oral at bedtime  sodium chloride 0.9%. 1000 milliLiter(s) (50 mL/Hr) IV Continuous <Continuous>  valACYclovir 500 milliGRAM(s) Oral every 12 hours    MEDICATIONS  (PRN):  acetaminophen     Tablet .. 650 milliGRAM(s) Oral every 6 hours PRN Temp greater or equal to 38C (100.4F), Mild Pain (1 - 3)  oxyCODONE    IR 5 milliGRAM(s) Oral every 6 hours PRN Moderate Pain (4 - 6)  sodium chloride 0.9% lock flush 10 milliLiter(s) IV Push every 1 hour PRN Pre/post blood products, medications, blood draw, and to maintain line patency      Allergies    atenolol (Unknown)  penicillin (Hives)  sulfa drugs (Hives)  shellfish (Hives)    Intolerances        REVIEW OF SYSTEMS:  CONSTITUTIONAL: No weakness, fevers or chills  EYES/ENT: No visual changes;  No vertigo or throat pain   NECK: No pain or stiffness  MUSCULOSKELETAL: + Back pain  RESPIRATORY: No cough, wheezing, hemoptysis; No shortness of breath  CARDIOVASCULAR: No chest pain or palpitations  GASTROINTESTINAL: No abdominal or epigastric pain. No nausea, vomiting, or hematemesis; No diarrhea or constipation. No melena or hematochezia.  GENITOURINARY: No dysuria, frequency or hematuria  NEUROLOGICAL: No numbness or weakness  SKIN: No itching, rashes  [X] All other systems negative      OBJECTIVE:  ICU Vital Signs Last 24 Hrs  T(C): 37.2 (13 Jan 2025 22:33), Max: 38.6 (13 Jan 2025 13:30)  T(F): 98.9 (13 Jan 2025 22:33), Max: 101.5 (13 Jan 2025 17:15)  HR: 74 (13 Jan 2025 22:33) (74 - 106)  BP: 163/76 (13 Jan 2025 22:33) (126/54 - 190/91)  BP(mean): --  ABP: --  ABP(mean): --  RR: 42 (13 Jan 2025 22:33) (18 - 56)  SpO2: 99% (13 Jan 2025 22:33) (94% - 100%)    O2 Parameters below as of 13 Jan 2025 22:33  Patient On (Oxygen Delivery Method): BiPAP/CPAP              01-12 @ 07:01 - 01-13 @ 07:00  --------------------------------------------------------  IN: 1080 mL / OUT: 2600 mL / NET: -1520 mL    01-13 @ 07:01  -  01-14 @ 00:35  --------------------------------------------------------  IN: 120 mL / OUT: 0 mL / NET: 120 mL      CAPILLARY BLOOD GLUCOSE      POCT Blood Glucose.: 370 mg/dL (13 Jan 2025 21:44)      PHYSICAL EXAM:  GENERAL: NAD  HEAD:  Atraumatic, normocephalic  EYES: EOMI, PERRLA, conjunctiva and sclera clear  ENT: Moist mucous membranes  NECK: Supple, no JVD  HEART: S1, S2, regular rate and rhythm, no murmurs, rubs, or gallops  LUNGS: Unlabored respirations.  Clear to auscultation bilaterally, no crackles, wheezing, or rhonchi  ABDOMEN: Soft, mildly tender to epigastric region, nondistended, +BS  EXTREMITIES: 2+ peripheral pulses bilaterally. No clubbing, cyanosis, or edema  NERVOUS SYSTEM:  A&Ox3, no focal deficits   SKIN: No rashes or lesions  LINES:     LABS:                        5.7    142.95 )-----------( 75       ( 13 Jan 2025 19:32 )             17.1     Hgb Trend: 5.7<--, 6.4<--, 7.7<--, 8.2<--, 8.2<--  01-13    123[L]  |  87[L]  |  38[H]  ----------------------------<  326[H]  4.5   |  18[L]  |  1.37[H]    Ca    8.3[L]      13 Jan 2025 19:32  Phos  3.1     01-13  Mg     2.0     01-13    TPro  7.0  /  Alb  3.6  /  TBili  1.0  /  DBili  x   /  AST  107[H]  /  ALT  19  /  AlkPhos  287[H]  01-13    Creatinine Trend: 1.37<--, 1.11<--, 0.91<--, 0.93<--, 0.92<--, 1.05<--  PT/INR - ( 13 Jan 2025 09:45 )   PT: 16.1 sec;   INR: 1.40 ratio         PTT - ( 13 Jan 2025 09:45 )  PTT:24.2 sec  Urinalysis Basic - ( 13 Jan 2025 19:32 )    Color: x / Appearance: x / SG: x / pH: x  Gluc: 326 mg/dL / Ketone: x  / Bili: x / Urobili: x   Blood: x / Protein: x / Nitrite: x   Leuk Esterase: x / RBC: x / WBC x   Sq Epi: x / Non Sq Epi: x / Bacteria: x      Arterial Blood Gas:  01-13 @ 19:50  7.40/31/121/19/98.8/-5.1  ABG lactate: --    Venous Blood Gas:  01-13 @ 22:46  7.28/50/38/24/56.6  VBG Lactate: 7.2  Venous Blood Gas:  01-13 @ 19:24  7.32/42/29/22/47.1  VBG Lactate: 6.9      MICROBIOLOGY:     RADIOLOGY & ADDITIONAL TESTS:    NOEMÍ PADILLA is a 74y female with PMH breast cancer s/p lumpectomy, RT and chemo in 1999 w/ local recurrence in 2023 s/p resection; colon ca s/p resection in 2021, CAD s/p CABG x2 in 2010, HTN, HLD and T2DM in the hospital for 7d transferred to the MICU for respiratory distress    PLAN  =====Neurologic=====  AOx4,  mildly encephalopathic, likely metabolic     =====Pulmonary=====  #Respiratory distress  - Patient with increased work of breathing   - Placed on BIPAP   - Wean off BIPAP as tolerated     =====Cardiovascular=====  #Hypertension  - Patient has history of hypertension   - C/w home meds     No requirement for pressors    =====GI=====  No active issues     NPO while on BIPAP     =====Renal/=====  #GODFREY  - Creatinine increase, likely multifactorial  - Monitor I's and O's  - Trend CMP    # Lactic acidosis   - Likely from AML  - Lactate downtrending, monitor to resolution     #Electrolytes  - Maintain K>4, Phos>3, Mag>2, iCal>1    =====Endocrine=====  No active issues     =====Infectious Disease=====  Patient is febrile, will cover with antibiotics empirically     - C/w vanc cefepime   - Cooling blanket     =====Heme/Onc=====  #Hemolytic anemia  - Labs consistent with DIC, elevated LDH, in the setting of blastic crisis   - Transfuse to goal of 7  - F/u TEG for additional blood products     #AML - Blastic crisis   - Heme/onc following   - F/u recs   - No indication for TLS labs at this time as per heme/onc, revaluate in the morning   - As per heme/onc, no indication for leukophoresis or CRRT indicated at this time     =====Ethics=====  FULL CODE MICU ACCEPT NOTE    CHIEF COMPLAINT: Patient is a 74y old  Female who presents with a chief complaint of AML (12 Jan 2025 09:43)      HPI:  75 y/o F with PMHx breast cancer s/p lumpectomy, RT and chemo in 1999 w/ local recurrence in 2023 s/p resection; colon ca s/p resection in 2021, CAD s/p CABG x2 in 2010, HTN, HLD and T2DM who initially presented to [facility] for lethargy x 1 month w/ exertional SOB, acute on chronic low back pain and new b/l leg pain. She was found to have  w/ 69% blasts, transferred to [facility] for hematology evaluation of new acute leukemia and blast crisis.     Brief ED course:  VS: T 98.2, HR 86, /79, RR 16 spO2 94% on RA.  Labs: .33 1/ 69% blasts, Hgb 9.5, plt 43. K 2.6, trop 143.6, pro-BNP 1015  Imaging:CT lumbar spine showing L5-S1 diffuse disc bulging contributes to high-grade bilateral foraminal compromise without significant central canal compromise    Pt seen and evaluated by heme/onc in the ED, given allopurinol 300mg x1, rasburicase 3mg IV x1, hydroxyurea 200mg PO x1, 10 mEq K repletion. Pt was accepted directly to MICU for emergent leukapheresis.  (07 Jan 2025 21:38)      Hospital Course: Patient was downgraded to floor to receive chemotherapy treatment, when there was an increasing concern for TLS, and respiratory distress requiring BIPAP. Heme/onc following, recommending to continue chemotherapy in the hopes of helping symptoms.     PAST MEDICAL & SURGICAL HISTORY:  Colon Cancer      Breast Ca  (L)      HTN (Hypertension)      Hyperlipidemia      Coronary artery disease      Diabetes mellitus      Vertigo      Renal insufficiency  per pt      History of Colon Resection  2001      S/P Breast Lumpectomy  (L) 1999      S/P CABG x 2  2010      H/O forearm fracture  1995 (R)          FAMILY HISTORY:  FH: breast cancer  mother        SOCIAL HISTORY:  Smoking:   Substance Use:   EtOH Use:   Advance Directives:     MEDICATIONS  (STANDING):  allopurinol 300 milliGRAM(s) Oral daily  Biotene Dry Mouth Oral Rinse 15 milliLiter(s) Swish and Spit three times a day  carvedilol 6.25 milliGRAM(s) Oral every 12 hours  cefepime   IVPB 2000 milliGRAM(s) IV Intermittent every 8 hours  cefepime   IVPB      chlorhexidine 2% Cloths 1 Application(s) Topical <User Schedule>  chlorhexidine 4% Liquid 1 Application(s) Topical <User Schedule>  decitabine IVPB (eMAR) 40 milliGRAM(s) IV Intermittent every 24 hours  hydrochlorothiazide 25 milliGRAM(s) Oral daily  hydroxyurea 2000 milliGRAM(s) Oral three times a day  insulin lispro (ADMELOG) corrective regimen sliding scale   SubCutaneous Before meals and at bedtime  lisinopril 40 milliGRAM(s) Oral daily  ondansetron Injectable 8 milliGRAM(s) IV Push every 24 hours  polyethylene glycol 3350 17 Gram(s) Oral daily  senna 2 Tablet(s) Oral at bedtime  sodium chloride 0.9%. 1000 milliLiter(s) (50 mL/Hr) IV Continuous <Continuous>  valACYclovir 500 milliGRAM(s) Oral every 12 hours    MEDICATIONS  (PRN):  acetaminophen     Tablet .. 650 milliGRAM(s) Oral every 6 hours PRN Temp greater or equal to 38C (100.4F), Mild Pain (1 - 3)  oxyCODONE    IR 5 milliGRAM(s) Oral every 6 hours PRN Moderate Pain (4 - 6)  sodium chloride 0.9% lock flush 10 milliLiter(s) IV Push every 1 hour PRN Pre/post blood products, medications, blood draw, and to maintain line patency      Allergies    atenolol (Unknown)  penicillin (Hives)  sulfa drugs (Hives)  shellfish (Hives)    Intolerances        REVIEW OF SYSTEMS:  CONSTITUTIONAL: No weakness, fevers or chills  EYES/ENT: No visual changes;  No vertigo or throat pain   NECK: No pain or stiffness  MUSCULOSKELETAL: + Back pain  RESPIRATORY: No cough, wheezing, hemoptysis; No shortness of breath  CARDIOVASCULAR: No chest pain or palpitations  GASTROINTESTINAL: No abdominal or epigastric pain. No nausea, vomiting, or hematemesis; No diarrhea or constipation. No melena or hematochezia.  GENITOURINARY: No dysuria, frequency or hematuria  NEUROLOGICAL: No numbness or weakness  SKIN: No itching, rashes  [X] All other systems negative      OBJECTIVE:  ICU Vital Signs Last 24 Hrs  T(C): 37.2 (13 Jan 2025 22:33), Max: 38.6 (13 Jan 2025 13:30)  T(F): 98.9 (13 Jan 2025 22:33), Max: 101.5 (13 Jan 2025 17:15)  HR: 74 (13 Jan 2025 22:33) (74 - 106)  BP: 163/76 (13 Jan 2025 22:33) (126/54 - 190/91)  BP(mean): --  ABP: --  ABP(mean): --  RR: 42 (13 Jan 2025 22:33) (18 - 56)  SpO2: 99% (13 Jan 2025 22:33) (94% - 100%)    O2 Parameters below as of 13 Jan 2025 22:33  Patient On (Oxygen Delivery Method): BiPAP/CPAP              01-12 @ 07:01 - 01-13 @ 07:00  --------------------------------------------------------  IN: 1080 mL / OUT: 2600 mL / NET: -1520 mL    01-13 @ 07:01  -  01-14 @ 00:35  --------------------------------------------------------  IN: 120 mL / OUT: 0 mL / NET: 120 mL      CAPILLARY BLOOD GLUCOSE      POCT Blood Glucose.: 370 mg/dL (13 Jan 2025 21:44)      PHYSICAL EXAM:  GENERAL: NAD  HEAD:  Atraumatic, normocephalic  EYES: EOMI, PERRLA, conjunctiva and sclera clear  ENT: Moist mucous membranes  NECK: Supple, no JVD  HEART: S1, S2, regular rate and rhythm, no murmurs, rubs, or gallops  LUNGS: Unlabored respirations.  Clear to auscultation bilaterally, no crackles, wheezing, or rhonchi  ABDOMEN: Soft, mildly tender to epigastric region, nondistended, +BS  EXTREMITIES: 2+ peripheral pulses bilaterally. No clubbing, cyanosis, or edema  NERVOUS SYSTEM:  A&Ox3, no focal deficits   SKIN: No rashes or lesions  LINES:     LABS:                        5.7    142.95 )-----------( 75       ( 13 Jan 2025 19:32 )             17.1     Hgb Trend: 5.7<--, 6.4<--, 7.7<--, 8.2<--, 8.2<--  01-13    123[L]  |  87[L]  |  38[H]  ----------------------------<  326[H]  4.5   |  18[L]  |  1.37[H]    Ca    8.3[L]      13 Jan 2025 19:32  Phos  3.1     01-13  Mg     2.0     01-13    TPro  7.0  /  Alb  3.6  /  TBili  1.0  /  DBili  x   /  AST  107[H]  /  ALT  19  /  AlkPhos  287[H]  01-13    Creatinine Trend: 1.37<--, 1.11<--, 0.91<--, 0.93<--, 0.92<--, 1.05<--  PT/INR - ( 13 Jan 2025 09:45 )   PT: 16.1 sec;   INR: 1.40 ratio         PTT - ( 13 Jan 2025 09:45 )  PTT:24.2 sec  Urinalysis Basic - ( 13 Jan 2025 19:32 )    Color: x / Appearance: x / SG: x / pH: x  Gluc: 326 mg/dL / Ketone: x  / Bili: x / Urobili: x   Blood: x / Protein: x / Nitrite: x   Leuk Esterase: x / RBC: x / WBC x   Sq Epi: x / Non Sq Epi: x / Bacteria: x      Arterial Blood Gas:  01-13 @ 19:50  7.40/31/121/19/98.8/-5.1  ABG lactate: --    Venous Blood Gas:  01-13 @ 22:46  7.28/50/38/24/56.6  VBG Lactate: 7.2  Venous Blood Gas:  01-13 @ 19:24  7.32/42/29/22/47.1  VBG Lactate: 6.9      MICROBIOLOGY:     RADIOLOGY & ADDITIONAL TESTS:    NOEMÍ PADILLA is a 74y female with PMH breast cancer s/p lumpectomy, RT and chemo in 1999 w/ local recurrence in 2023 s/p resection; colon ca s/p resection in 2021, CAD s/p CABG x2 in 2010, HTN, HLD and T2DM in the hospital for 7d transferred to the MICU for respiratory distress    PLAN  =====Neurologic=====  AOx4,  mildly encephalopathic, likely metabolic     =====Pulmonary=====  #Respiratory distress  - Patient with increased work of breathing   - Placed on BIPAP   - Wean off BIPAP as tolerated     =====Cardiovascular=====  #Hypertension  - Patient has history of hypertension   - C/w home meds     No requirement for pressors    =====GI=====  No active issues     NPO while on BIPAP     =====Renal/=====  #GODFREY  - Creatinine increase, likely multifactorial  - Monitor I's and O's  - Trend CMP    # Lactic acidosis   - Likely from AML  - Lactate downtrending, monitor to resolution     #Electrolytes  - Maintain K>4, Phos>3, Mag>2, iCal>1    =====Endocrine=====  No active issues     =====Infectious Disease=====  Patient is febrile, will cover with antibiotics empirically     - C/w vanc cefepime   - Cooling blanket     =====Heme/Onc=====  #Hemolytic anemia  - Labs consistent with DIC, elevated LDH, in the setting of blastic crisis   - Transfuse to goal of 7  - F/u TEG for additional blood products     #AML - Blastic crisis   - Heme/onc following   - F/u recs   - No indication for TLS labs at this time as per heme/onc, revaluate in the morning   - As per heme/onc, no indication for leukophoresis or CRRT indicated at this time     =====Ethics=====  FULL CODE       Critical Care Attending Attestation:   74F Hx HTN, T2DM, CAD s/p CABG, HFpEF 65%, Breast CA s/p CXT/XRT 1996 with Recurrence s/p Lt Mastectomy, Colon CA Resection 2021, Ex-MICU (1/7-1/9) for Hyperleukocytosis 287K, Newly Dx AML with Blast Crisis s/p Leukopheresis x 2 and transferred to Medicine/Hemo-Oncology Floor. RRT called this evening for Fever, Respiratory Distress on BiPAP, Hb dropped to 5.7 and elevated LDH 4432 concerned for Acute Hemolytic Reaction and DIC R/O Tumor Lysis to be admitted to MICU for close monitoring.   - Awake with mild metabolic encephalopathy A&O x 2-3   - Hypertensive 178/80 mmHg and Tachycardic 100-120 from Fever    - CXR no acute infiltrates pending Panculture    - BiPAP 16RR, 10/5, FiO2 50%, SpO2 100%  - Empiric ABx Coverage and Transfusion prn per Hemo/Oncology   - No acute indication for Leukopheresis or CRRT   - Serial CBC, CMPs, PT/INR, Hemolytic, TEGs and DIC panel, Lactate   - Empiric Abx Coverage for Bi-cytopenia   - DVT PPx - SCD   - GOC - Full Code     Patient seen and examined with ICU Resident/Fellow at bedside after lab data, medical records and radiology reports reviewed. I have read and agreeable in general with resident's Documented Note, Assessment and Management Plan which reflected my opinions from bedside round and discussion.   Total Critical Care Time = 45 Min excluding teaching and procedure activity.

## 2025-01-14 NOTE — CHART NOTE - NSCHARTNOTEFT_GEN_A_CORE
:  Aguila Fernandez  INDICATION: Respiratory  failure    PROCEDURE:  [x ] LIMITED ECHO  [x ] LIMITED CHEST  [ ] LIMITED RETROPERITONEAL  [ ] LIMITED ABDOMINAL  [ ] LIMITED DVT  [ ] NEEDLE GUIDANCE VASCULAR  [ ] NEEDLE GUIDANCE THORACENTESIS  [ ] NEEDLE GUIDANCE PARACENTESIS  [ ] NEEDLE GUIDANCE PERICARDIOCENTESIS  [ ] OTHER    FINDINGS:                  Echo. Thickened myocardium.  Normal LV systolic function. RV< LV. Virtual IVC                  Chest: A line predominant bilaterally without pleural effusion or consolidation.    INTERPRETATION:1. Normal LV systolic function                            2. Virtual IVC suggestive of volume depletion

## 2025-01-14 NOTE — PHARMACOTHERAPY INTERVENTION NOTE - COMMENTS
DARÍO PADILLA 74y F with PMH breast cancer and colon cancer s/p resection and in remission, CAD s/p CABG x2, T2DM, gout, HTN, HLD directly admitted to MICU after presenting to [facility] w/ hyperleukocytosis w/ blast crises c/f new leukemia.    Historical Values  Comprehensive Metabolic Panel (01.14.25 @ 09:19)   Glucose: 299 mg/dL  Comprehensive Metabolic Panel (01.14.25 @ 01:36)   Glucose: 341 mg/dL  Comprehensive Metabolic Panel (01.13.25 @ 19:32)   Glucose: 326 mg/dL  Comprehensive Metabolic Panel (01.13.25 @ 10:51)   Glucose: 311 mg/dL  Comprehensive Metabolic Panel (01.13.25 @ 06:42)   Glucose: 624 mg/dL  Over the last 24 hours the patients glucose level has been persistently elevated, requiring 21U of sliding scale insulin.  Can consider starting insulin NPH 3U q6h.    Recommendations:  1. Start insulin NPH 3U q6h    Discussed with MICU team and adjusted as above.    Santana Galicia, PharmD  PGY-2 Critical Care Pharmacy Resident  Available via [employer] Teams

## 2025-01-14 NOTE — PROGRESS NOTE ADULT - CRITICAL CARE ATTENDING COMMENT
Upon my evaluation, this patient is at high risk for imminent or life threatening deterioration due to TLS, AML and other active medical issues which require my direct attention, intervention, and personal management.  I have personally spent >30 minutes  of critical care time exclusive of time spent on separate billing procedures. This includes review of laboratory data, radiology results, discussion with primary team\patient, and monitoring for potential decompensation. Interventions were performed as documented above.

## 2025-01-14 NOTE — PROGRESS NOTE ADULT - SUBJECTIVE AND OBJECTIVE BOX
PROGRESS NOTE    SUBJECTIVE/OVERNIGHT:  RRT overnight for hypertension, AMS, fever requiring NIV. Chemo infusion stopped shortly after initiation. Pt transferred to MICU. This AM pt alert, responding to instructions, objectively improving on labs on BiPAP.      PHYSICAL EXAM:  Vital Signs Last 24 Hrs  T(C): 38 (2025 15:00), Max: 39.6 (2025 01:14)  T(F): 100.4 (2025 15:00), Max: 103.3 (2025 01:14)  HR: 93 (2025 15:15) (74 - 142)  BP: 116/58 (2025 15:00) (103/44 - 231/118)  BP(mean): 83 (2025 15:00) (64 - 160)  RR: 28 (2025 15:00) (20 - 59)  SpO2: 100% (2025 15:15) (81% - 100%)    Parameters below as of 2025 01:14  Patient On (Oxygen Delivery Method): BiPAP/CPAP        General: NAD  HEENT: clear oropharynx, anicteric sclera, pink conjunctiva; BiPAP+  Neck: supple  CV: (+) S1/S2 RRR  Lungs: positive air movement b/l ant lungs  Abdomen: soft, non-tender, non-distended  Ext: no clubbing cyanosis or edema  Skin: no rashes and no petechiae  Neuro: alert, moving all extremities, following instructions, no focal deficits  Central Line: PICC c/d/i        LABS/IMAGIN.1    86.38 )-----------( 22       ( 2025 15:54 )             23.2         124[L]  |  90[L]  |  64[H]  ----------------------------<  266[H]  4.8   |  19[L]  |  2.26[H]    Ca    7.9[L]      2025 15:54  Phos  4.2       Mg     1.9         TPro  6.2  /  Alb  3.0[L]  /  TBili  1.0  /  DBili  x   /  AST  83[H]  /  ALT  22  /  AlkPhos  377[H]              < from: Xray Chest 1 View- PORTABLE-Urgent (Xray Chest 1 View- PORTABLE-Urgent .) (25 @ 21:08) >  CLINICAL HISTORY: sob/hypoxia    FINDINGS:    Single frontal view of the chest demonstrates left basilar atelectasis.   Please refer to the concomitant chest CT for further details.. The   cardiomediastinal silhouette is enlarged. Right-sided PICC line catheter   tip in the distal SVC. Mediastinal sternotomy wires.. No acute osseous   abnormalities. Overlying EKG leads and wires are noted    IMPRESSION: Mild left basilar atelectasis.    < end of copied text >

## 2025-01-14 NOTE — PROGRESS NOTE ADULT - SUBJECTIVE AND OBJECTIVE BOX
CHIEF COMPLAINT: Patient is a 74y old  Female who presents with a chief complaint of AML (12 Jan 2025 09:43)    Interval Events:    REVIEW OF SYSTEMS:    CONSTITUTIONAL:  No weakness, fevers or chills  EYES/ENT:  No visual changes;  No vertigo or throat pain   NECK:  No pain or stiffness  RESPIRATORY:  No cough, wheezing, hemoptysis; No shortness of breath  CARDIOVASCULAR:  No chest pain or palpitations  GASTROINTESTINAL:  No abdominal or epigastric pain. No nausea, vomiting, or hematemesis; No diarrhea or constipation. No melena or hematochezia.  GENITOURINARY:  No dysuria, frequency or hematuria  MUSCULOSKELETAL:  FROM all extremities, normal strength, No calf tenderness  NEUROLOGICAL:  No numbness or weakness  SKIN:  No itching, rashes  [ ] All other systems negative  [X] Unable to assess ROS because I/S    OBJECTIVE:  ICU Vital Signs Last 24 Hrs  T(C): 37.9 (14 Jan 2025 04:00), Max: 39.6 (14 Jan 2025 01:14)  T(F): 100.2 (14 Jan 2025 04:00), Max: 103.3 (14 Jan 2025 01:14)  HR: 96 (14 Jan 2025 06:00) (74 - 119)  BP: 155/69 (14 Jan 2025 06:00) (126/54 - 231/118)  BP(mean): 99 (14 Jan 2025 06:00) (96 - 160)  ABP: --  ABP(mean): --  RR: 32 (14 Jan 2025 06:00) (18 - 56)  SpO2: 100% (14 Jan 2025 06:00) (94% - 100%)    O2 Parameters below as of 14 Jan 2025 01:14  Patient On (Oxygen Delivery Method): BiPAP/CPAP            01-13 @ 07:01  -  01-14 @ 07:00  --------------------------------------------------------  IN: 270 mL / OUT: 1100 mL / NET: -830 mL        Drips  CAPILLARY BLOOD GLUCOSE      POCT Blood Glucose.: 319 mg/dL (14 Jan 2025 05:38)          PHYSICAL EXAM:  General: NAD, doing well.  HEENT: [X] Intubated. [X] OG in place. Brainstem reflexes in place  Lymph Nodes: No CASA palpated  Neck: trachea midline. no trach.   Respiratory: CTA b/l. No rales, rhonchi, wheezing appreciated.  Cardiovascular: RRR. +s1/s2, -s3/s4. No murmur, rubs, gallops. No edema  Abdomen: NT/ND. +BS, No HSM.   Extremities: 2+ pulses  Skin: [ ] edematous. No rashes, ecchymoses, or petechiae noted  Neurological: AAOx3. DTR 2+. strength 5/5 UE/LE bilaterally.   Psychiatry: Appropriate mood and affect.    LABS:  (01-14 @ 01:36)                        7.5  138.26 )-----------( 51                 22.1    Neutrophils = -- (--%)  Lymphocytes = -- (--%)  Eosinophils = -- (--%)  Basophils = -- (--%)  Monocytes = -- (--%)  Bands = --%    WBC Trend: 138.26<--, 142.95<--, 127.91<--  Hb Trend: 7.5<--, 5.7<--, 6.4<--, 7.7<--, 8.2<--  Plt Trend: 51<--, 75<--, 14<--, 22<--, 32<--  01-14    124[L]  |  87[L]  |  47[H]  ----------------------------<  341[H]  4.9   |  20[L]  |  1.66[H]    Ca    8.6      14 Jan 2025 01:36  Phos  3.9     01-14  Mg     2.2     01-14    TPro  7.7  /  Alb  3.9  /  TBili  1.2  /  DBili  x   /  AST  132[H]  /  ALT  26  /  AlkPhos  394[H]  01-14    Creatinine Trend: 1.66<--, 1.37<--, 1.11<--, 0.91<--, 0.93<--, 0.92<--  PT/INR - ( 14 Jan 2025 01:36 )   PT: 16.7 sec;   INR: 1.46 ratio         PTT - ( 14 Jan 2025 01:36 )  PTT:31.0 sec  Urinalysis Basic - ( 14 Jan 2025 01:36 )    Color: x / Appearance: x / SG: x / pH: x  Gluc: 341 mg/dL / Ketone: x  / Bili: x / Urobili: x   Blood: x / Protein: x / Nitrite: x   Leuk Esterase: x / RBC: x / WBC x   Sq Epi: x / Non Sq Epi: x / Bacteria: x      Arterial Blood Gas:  01-14 @ 01:20  7.37/38/71/22/95.9/-3.0  ABG lactate: --  Arterial Blood Gas:  01-13 @ 19:50  7.40/31/121/19/98.8/-5.1  ABG lactate: --    Venous Blood Gas:  01-13 @ 22:46  7.28/50/38/24/56.6  VBG Lactate: 7.2  Venous Blood Gas:  01-13 @ 19:24  7.32/42/29/22/47.1  VBG Lactate: 6.9          MICROBIOLOGY:   Blood Cx:  Urine Cx:  Sputum Cx:  Legionella:  RVP:    HOSPITAL MEDICATIONS:  MEDICATIONS  (STANDING):  allopurinol 300 milliGRAM(s) Oral daily  Biotene Dry Mouth Oral Rinse 15 milliLiter(s) Swish and Spit three times a day  carvedilol 6.25 milliGRAM(s) Oral every 12 hours  cefepime   IVPB 2000 milliGRAM(s) IV Intermittent every 8 hours  cefepime   IVPB      chlorhexidine 2% Cloths 1 Application(s) Topical <User Schedule>  chlorhexidine 4% Liquid 1 Application(s) Topical <User Schedule>  decitabine IVPB (eMAR) 40 milliGRAM(s) IV Intermittent every 24 hours  dextrose 5%. 1000 milliLiter(s) (100 mL/Hr) IV Continuous <Continuous>  dextrose 5%. 1000 milliLiter(s) (50 mL/Hr) IV Continuous <Continuous>  dextrose 50% Injectable 25 Gram(s) IV Push once  dextrose 50% Injectable 12.5 Gram(s) IV Push once  dextrose 50% Injectable 25 Gram(s) IV Push once  glucagon  Injectable 1 milliGRAM(s) IntraMuscular once  hydrochlorothiazide 25 milliGRAM(s) Oral daily  hydroxyurea 2000 milliGRAM(s) Oral three times a day  insulin lispro (ADMELOG) corrective regimen sliding scale   SubCutaneous every 6 hours  lisinopril 40 milliGRAM(s) Oral daily  ondansetron Injectable 8 milliGRAM(s) IV Push every 24 hours  polyethylene glycol 3350 17 Gram(s) Oral daily  senna 2 Tablet(s) Oral at bedtime  sodium chloride 0.9%. 1000 milliLiter(s) (50 mL/Hr) IV Continuous <Continuous>  valACYclovir 500 milliGRAM(s) Oral every 12 hours    MEDICATIONS  (PRN):  acetaminophen     Tablet .. 650 milliGRAM(s) Oral every 6 hours PRN Temp greater or equal to 38C (100.4F), Mild Pain (1 - 3)  dextrose Oral Gel 15 Gram(s) Oral once PRN Blood Glucose LESS THAN 70 milliGRAM(s)/deciliter  oxyCODONE    IR 5 milliGRAM(s) Oral every 6 hours PRN Moderate Pain (4 - 6)  sodium chloride 0.9% lock flush 10 milliLiter(s) IV Push every 1 hour PRN Pre/post blood products, medications, blood draw, and to maintain line patency      RADIOLOGY:  X Ray:  CT:  MRI:  Ultrasound:  [ ] Reviewed and interpreted by me    EKG:   CHIEF COMPLAINT: Patient is a 74y old  Female who presents with a chief complaint of AML (12 Jan 2025 09:43)    Interval Events:  Today; patient appears comfortable on BiPAP; triggering her vent; pulling good tidal volumes; admitted to the MICU overnight for TLS and respiratory distress with plan to continue chemo per Heme.     REVIEW OF SYSTEMS:    CONSTITUTIONAL:  Denies signficant pain  EYES/ENT:  Reports throat feels dry   RESPIRATORY:  No cough, wheezing, hemoptysis; No shortness of breath  CARDIOVASCULAR:  No chest pain or palpitations  GENITOURINARY:  No dysuria, frequency or hematuria  MUSCULOSKELETAL:  FROM all extremities, normal strength, No calf tenderness  NEUROLOGICAL:  No numbness or weakness  SKIN:  No itching, rashes      OBJECTIVE:  ICU Vital Signs Last 24 Hrs  T(C): 37.9 (14 Jan 2025 04:00), Max: 39.6 (14 Jan 2025 01:14)  T(F): 100.2 (14 Jan 2025 04:00), Max: 103.3 (14 Jan 2025 01:14)  HR: 96 (14 Jan 2025 06:00) (74 - 119)  BP: 155/69 (14 Jan 2025 06:00) (126/54 - 231/118)  BP(mean): 99 (14 Jan 2025 06:00) (96 - 160)  ABP: --  ABP(mean): --  RR: 32 (14 Jan 2025 06:00) (18 - 56)  SpO2: 100% (14 Jan 2025 06:00) (94% - 100%)    O2 Parameters below as of 14 Jan 2025 01:14  Patient On (Oxygen Delivery Method): BiPAP/CPAP            01-13 @ 07:01  -  01-14 @ 07:00  --------------------------------------------------------  IN: 270 mL / OUT: 1100 mL / NET: -830 mL        Drips  CAPILLARY BLOOD GLUCOSE      POCT Blood Glucose.: 319 mg/dL (14 Jan 2025 05:38)          PHYSICAL EXAM:  General: NAD,  Lymph Nodes: No CASA palpated  Respiratory: CTA b/l. No rales, rhonchi, wheezing appreciated.  Cardiovascular: RRR. +s1/s2, -s3/s4. No murmur, rubs, gallops. No edema  Abdomen: NT/ND. +BS, No HSM.   Extremities: 2+ pulses  Neurological: AAOx3  Psychiatry: Appropriate mood and affect.    LABS:  (01-14 @ 01:36)                        7.5  138.26 )-----------( 51                 22.1    Neutrophils = -- (--%)  Lymphocytes = -- (--%)  Eosinophils = -- (--%)  Basophils = -- (--%)  Monocytes = -- (--%)  Bands = --%    WBC Trend: 138.26<--, 142.95<--, 127.91<--  Hb Trend: 7.5<--, 5.7<--, 6.4<--, 7.7<--, 8.2<--  Plt Trend: 51<--, 75<--, 14<--, 22<--, 32<--  01-14    124[L]  |  87[L]  |  47[H]  ----------------------------<  341[H]  4.9   |  20[L]  |  1.66[H]    Ca    8.6      14 Jan 2025 01:36  Phos  3.9     01-14  Mg     2.2     01-14    TPro  7.7  /  Alb  3.9  /  TBili  1.2  /  DBili  x   /  AST  132[H]  /  ALT  26  /  AlkPhos  394[H]  01-14    Creatinine Trend: 1.66<--, 1.37<--, 1.11<--, 0.91<--, 0.93<--, 0.92<--  PT/INR - ( 14 Jan 2025 01:36 )   PT: 16.7 sec;   INR: 1.46 ratio         PTT - ( 14 Jan 2025 01:36 )  PTT:31.0 sec  Urinalysis Basic - ( 14 Jan 2025 01:36 )    Color: x / Appearance: x / SG: x / pH: x  Gluc: 341 mg/dL / Ketone: x  / Bili: x / Urobili: x   Blood: x / Protein: x / Nitrite: x   Leuk Esterase: x / RBC: x / WBC x   Sq Epi: x / Non Sq Epi: x / Bacteria: x      Arterial Blood Gas:  01-14 @ 01:20  7.37/38/71/22/95.9/-3.0  ABG lactate: --  Arterial Blood Gas:  01-13 @ 19:50  7.40/31/121/19/98.8/-5.1  ABG lactate: --    Venous Blood Gas:  01-13 @ 22:46  7.28/50/38/24/56.6  VBG Lactate: 7.2  Venous Blood Gas:  01-13 @ 19:24  7.32/42/29/22/47.1  VBG Lactate: 6.9          MICROBIOLOGY:   Blood Cx: 1/13 -   Urine Cx: 1/12 NGTD  Sputum Cx:  Legionella:  RVP:    HOSPITAL MEDICATIONS:  MEDICATIONS  (STANDING):  allopurinol 300 milliGRAM(s) Oral daily  Biotene Dry Mouth Oral Rinse 15 milliLiter(s) Swish and Spit three times a day  carvedilol 6.25 milliGRAM(s) Oral every 12 hours  cefepime   IVPB 2000 milliGRAM(s) IV Intermittent every 8 hours  cefepime   IVPB      chlorhexidine 2% Cloths 1 Application(s) Topical <User Schedule>  chlorhexidine 4% Liquid 1 Application(s) Topical <User Schedule>  decitabine IVPB (eMAR) 40 milliGRAM(s) IV Intermittent every 24 hours  dextrose 5%. 1000 milliLiter(s) (100 mL/Hr) IV Continuous <Continuous>  dextrose 5%. 1000 milliLiter(s) (50 mL/Hr) IV Continuous <Continuous>  dextrose 50% Injectable 25 Gram(s) IV Push once  dextrose 50% Injectable 12.5 Gram(s) IV Push once  dextrose 50% Injectable 25 Gram(s) IV Push once  glucagon  Injectable 1 milliGRAM(s) IntraMuscular once  hydrochlorothiazide 25 milliGRAM(s) Oral daily  hydroxyurea 2000 milliGRAM(s) Oral three times a day  insulin lispro (ADMELOG) corrective regimen sliding scale   SubCutaneous every 6 hours  lisinopril 40 milliGRAM(s) Oral daily  ondansetron Injectable 8 milliGRAM(s) IV Push every 24 hours  polyethylene glycol 3350 17 Gram(s) Oral daily  senna 2 Tablet(s) Oral at bedtime  sodium chloride 0.9%. 1000 milliLiter(s) (50 mL/Hr) IV Continuous <Continuous>  valACYclovir 500 milliGRAM(s) Oral every 12 hours    MEDICATIONS  (PRN):  acetaminophen     Tablet .. 650 milliGRAM(s) Oral every 6 hours PRN Temp greater or equal to 38C (100.4F), Mild Pain (1 - 3)  dextrose Oral Gel 15 Gram(s) Oral once PRN Blood Glucose LESS THAN 70 milliGRAM(s)/deciliter  oxyCODONE    IR 5 milliGRAM(s) Oral every 6 hours PRN Moderate Pain (4 - 6)  sodium chloride 0.9% lock flush 10 milliLiter(s) IV Push every 1 hour PRN Pre/post blood products, medications, blood draw, and to maintain line patency      RADIOLOGY:  X Ray:  CT:  MRI:  Ultrasound:  [ ] Reviewed and interpreted by me    EKG:   CHIEF COMPLAINT: Patient is a 74y old  Female who presents with a chief complaint of AML (12 Jan 2025 09:43)    Interval Events:  Today; patient appears comfortable on BiPAP; triggering her vent; pulling good tidal volumes; admitted to the MICU overnight for TLS and respiratory distress with plan to continue chemo per Heme; will need further discussions with family regarding GOC    REVIEW OF SYSTEMS:    CONSTITUTIONAL:  Denies signficant pain  EYES/ENT:  Reports throat feels dry   RESPIRATORY:  No cough, wheezing, hemoptysis; No shortness of breath  CARDIOVASCULAR:  No chest pain or palpitations  GENITOURINARY:  No dysuria, frequency or hematuria  MUSCULOSKELETAL:  FROM all extremities, normal strength, No calf tenderness  NEUROLOGICAL:  No numbness or weakness  SKIN:  No itching, rashes      OBJECTIVE:  ICU Vital Signs Last 24 Hrs  T(C): 37.9 (14 Jan 2025 04:00), Max: 39.6 (14 Jan 2025 01:14)  T(F): 100.2 (14 Jan 2025 04:00), Max: 103.3 (14 Jan 2025 01:14)  HR: 96 (14 Jan 2025 06:00) (74 - 119)  BP: 155/69 (14 Jan 2025 06:00) (126/54 - 231/118)  BP(mean): 99 (14 Jan 2025 06:00) (96 - 160)  ABP: --  ABP(mean): --  RR: 32 (14 Jan 2025 06:00) (18 - 56)  SpO2: 100% (14 Jan 2025 06:00) (94% - 100%)    O2 Parameters below as of 14 Jan 2025 01:14  Patient On (Oxygen Delivery Method): BiPAP/CPAP            01-13 @ 07:01  -  01-14 @ 07:00  --------------------------------------------------------  IN: 270 mL / OUT: 1100 mL / NET: -830 mL        Drips  CAPILLARY BLOOD GLUCOSE      POCT Blood Glucose.: 319 mg/dL (14 Jan 2025 05:38)          PHYSICAL EXAM:  General: NAD,  Lymph Nodes: No CASA palpated  Respiratory: CTA b/l. Diffuse A Lines  Cardiovascular: RRR. +s1/s2, -s3/s4. No murmur, rubs, gallops. No edema  Abdomen: NT/ND. +BS, No HSM.   Extremities: 2+ pulses  Neurological: AAOx3  Psychiatry: Appropriate mood and affect.    LABS:  (01-14 @ 01:36)                        7.5  138.26 )-----------( 51                 22.1    Neutrophils = -- (--%)  Lymphocytes = -- (--%)  Eosinophils = -- (--%)  Basophils = -- (--%)  Monocytes = -- (--%)  Bands = --%    WBC Trend: 138.26<--, 142.95<--, 127.91<--  Hb Trend: 7.5<--, 5.7<--, 6.4<--, 7.7<--, 8.2<--  Plt Trend: 51<--, 75<--, 14<--, 22<--, 32<--  01-14    124[L]  |  87[L]  |  47[H]  ----------------------------<  341[H]  4.9   |  20[L]  |  1.66[H]    Ca    8.6      14 Jan 2025 01:36  Phos  3.9     01-14  Mg     2.2     01-14    TPro  7.7  /  Alb  3.9  /  TBili  1.2  /  DBili  x   /  AST  132[H]  /  ALT  26  /  AlkPhos  394[H]  01-14    Creatinine Trend: 1.66<--, 1.37<--, 1.11<--, 0.91<--, 0.93<--, 0.92<--  PT/INR - ( 14 Jan 2025 01:36 )   PT: 16.7 sec;   INR: 1.46 ratio         PTT - ( 14 Jan 2025 01:36 )  PTT:31.0 sec  Urinalysis Basic - ( 14 Jan 2025 01:36 )    Color: x / Appearance: x / SG: x / pH: x  Gluc: 341 mg/dL / Ketone: x  / Bili: x / Urobili: x   Blood: x / Protein: x / Nitrite: x   Leuk Esterase: x / RBC: x / WBC x   Sq Epi: x / Non Sq Epi: x / Bacteria: x      Arterial Blood Gas:  01-14 @ 01:20  7.37/38/71/22/95.9/-3.0  ABG lactate: --  Arterial Blood Gas:  01-13 @ 19:50  7.40/31/121/19/98.8/-5.1  ABG lactate: --    Venous Blood Gas:  01-13 @ 22:46  7.28/50/38/24/56.6  VBG Lactate: 7.2  Venous Blood Gas:  01-13 @ 19:24  7.32/42/29/22/47.1  VBG Lactate: 6.9          MICROBIOLOGY:   Blood Cx: 1/13 -   Urine Cx: 1/12 NGTD  Sputum Cx:  Legionella:  RVP:    HOSPITAL MEDICATIONS:  MEDICATIONS  (STANDING):  allopurinol 300 milliGRAM(s) Oral daily  Biotene Dry Mouth Oral Rinse 15 milliLiter(s) Swish and Spit three times a day  carvedilol 6.25 milliGRAM(s) Oral every 12 hours  cefepime   IVPB 2000 milliGRAM(s) IV Intermittent every 8 hours  cefepime   IVPB      chlorhexidine 2% Cloths 1 Application(s) Topical <User Schedule>  chlorhexidine 4% Liquid 1 Application(s) Topical <User Schedule>  decitabine IVPB (eMAR) 40 milliGRAM(s) IV Intermittent every 24 hours  dextrose 5%. 1000 milliLiter(s) (100 mL/Hr) IV Continuous <Continuous>  dextrose 5%. 1000 milliLiter(s) (50 mL/Hr) IV Continuous <Continuous>  dextrose 50% Injectable 25 Gram(s) IV Push once  dextrose 50% Injectable 12.5 Gram(s) IV Push once  dextrose 50% Injectable 25 Gram(s) IV Push once  glucagon  Injectable 1 milliGRAM(s) IntraMuscular once  hydrochlorothiazide 25 milliGRAM(s) Oral daily  hydroxyurea 2000 milliGRAM(s) Oral three times a day  insulin lispro (ADMELOG) corrective regimen sliding scale   SubCutaneous every 6 hours  lisinopril 40 milliGRAM(s) Oral daily  ondansetron Injectable 8 milliGRAM(s) IV Push every 24 hours  polyethylene glycol 3350 17 Gram(s) Oral daily  senna 2 Tablet(s) Oral at bedtime  sodium chloride 0.9%. 1000 milliLiter(s) (50 mL/Hr) IV Continuous <Continuous>  valACYclovir 500 milliGRAM(s) Oral every 12 hours    MEDICATIONS  (PRN):  acetaminophen     Tablet .. 650 milliGRAM(s) Oral every 6 hours PRN Temp greater or equal to 38C (100.4F), Mild Pain (1 - 3)  dextrose Oral Gel 15 Gram(s) Oral once PRN Blood Glucose LESS THAN 70 milliGRAM(s)/deciliter  oxyCODONE    IR 5 milliGRAM(s) Oral every 6 hours PRN Moderate Pain (4 - 6)  sodium chloride 0.9% lock flush 10 milliLiter(s) IV Push every 1 hour PRN Pre/post blood products, medications, blood draw, and to maintain line patency      RADIOLOGY:  X Ray:  CT:  MRI:  Ultrasound:  [ ] Reviewed and interpreted by me    EKG:

## 2025-01-14 NOTE — PROGRESS NOTE ADULT - SUBJECTIVE AND OBJECTIVE BOX
[facility] - Blessing Moore, Jewel Morales, Calos Ackerman  Office Number:  [phone]    Admitted to MICU  Worsening lactate, anemia  Currently on BiPAP  Does not want chemo    ROS: negative unless otherwise mentioned.    Telemetry: SR, PACs    MEDICATIONS  (STANDING):  allopurinol 300 milliGRAM(s) Oral daily  Biotene Dry Mouth Oral Rinse 15 milliLiter(s) Swish and Spit three times a day  carvedilol 6.25 milliGRAM(s) Oral every 12 hours  caspofungin IVPB 70 milliGRAM(s) IV Intermittent once  caspofungin IVPB      cefepime   IVPB 2000 milliGRAM(s) IV Intermittent every 8 hours  cefepime   IVPB      chlorhexidine 2% Cloths 1 Application(s) Topical <User Schedule>  chlorhexidine 4% Liquid 1 Application(s) Topical <User Schedule>  dextrose 5%. 1000 milliLiter(s) (100 mL/Hr) IV Continuous <Continuous>  dextrose 5%. 1000 milliLiter(s) (50 mL/Hr) IV Continuous <Continuous>  dextrose 50% Injectable 25 Gram(s) IV Push once  dextrose 50% Injectable 12.5 Gram(s) IV Push once  dextrose 50% Injectable 25 Gram(s) IV Push once  glucagon  Injectable 1 milliGRAM(s) IntraMuscular once  hydrochlorothiazide 25 milliGRAM(s) Oral daily  hydroxyurea 2000 milliGRAM(s) Oral three times a day  insulin lispro (ADMELOG) corrective regimen sliding scale   SubCutaneous every 6 hours  insulin NPH human recombinant 3 Unit(s) SubCutaneous every 6 hours  labetalol Injectable 10 milliGRAM(s) IV Push every 6 hours  lactated ringers. 1000 milliLiter(s) (250 mL/Hr) IV Continuous <Continuous>  lisinopril 40 milliGRAM(s) Oral daily  ondansetron Injectable 8 milliGRAM(s) IV Push every 24 hours  polyethylene glycol 3350 17 Gram(s) Oral daily  senna 2 Tablet(s) Oral at bedtime  valACYclovir 500 milliGRAM(s) Oral every 12 hours    MEDICATIONS  (PRN):  acetaminophen     Tablet .. 650 milliGRAM(s) Oral every 6 hours PRN Temp greater or equal to 38C (100.4F), Mild Pain (1 - 3)  dextrose Oral Gel 15 Gram(s) Oral once PRN Blood Glucose LESS THAN 70 milliGRAM(s)/deciliter  oxyCODONE    IR 5 milliGRAM(s) Oral every 6 hours PRN Moderate Pain (4 - 6)  sodium chloride 0.9% lock flush 10 milliLiter(s) IV Push every 1 hour PRN Pre/post blood products, medications, blood draw, and to maintain line patency      Allergies    atenolol (Unknown)  penicillin (Hives)  sulfa drugs (Hives)  shellfish (Hives)    Intolerances        Vital Signs Last 24 Hrs  T(C): 38.7 (14 Jan 2025 09:00), Max: 39.6 (14 Jan 2025 01:14)  T(F): 101.7 (14 Jan 2025 09:00), Max: 103.3 (14 Jan 2025 01:14)  HR: 93 (14 Jan 2025 09:26) (74 - 119)  BP: 103/44 (14 Jan 2025 09:00) (103/44 - 231/118)  BP(mean): 64 (14 Jan 2025 09:00) (64 - 160)  RR: 35 (14 Jan 2025 09:00) (18 - 59)  SpO2: 100% (14 Jan 2025 09:26) (94% - 100%)    Parameters below as of 14 Jan 2025 01:14  Patient On (Oxygen Delivery Method): BiPAP/CPAP        I&O's Summary    13 Jan 2025 07:01  -  14 Jan 2025 07:00  --------------------------------------------------------  IN: 270 mL / OUT: 1100 mL / NET: -830 mL    14 Jan 2025 07:01  -  14 Jan 2025 09:40  --------------------------------------------------------  IN: 350 mL / OUT: 25 mL / NET: 325 mL        ON EXAM:    General: NAD, on BiPAP  HEENT: Mucous membranes are dry, anicteric  Lungs: Non-labored, breath sounds are clear bilaterally, No wheezing, rales or rhonchi  Cardiovascular: Regular, S1 and S2, no murmurs, rubs, or gallops  Gastrointestinal: Bowel Sounds present, soft, nontender.   Lymph: No peripheral edema. No lymphadenopathy.  Skin: No rashes or ulcers  Psych: cannot assess    LABS: All Labs Reviewed:                        6.9    101.58 )-----------( 39       ( 14 Jan 2025 09:19 )             20.5                         7.5    138.26 )-----------( 51       ( 14 Jan 2025 01:36 )             22.1                         5.7    142.95 )-----------( 75       ( 13 Jan 2025 19:32 )             17.1     14 Jan 2025 01:36    124    |  87     |  47     ----------------------------<  341    4.9     |  20     |  1.66   13 Jan 2025 19:32    123    |  87     |  38     ----------------------------<  326    4.5     |  18     |  1.37   13 Jan 2025 10:51    124    |  86     |  29     ----------------------------<  311    4.3     |  21     |  1.11     Ca    8.6        14 Jan 2025 01:36  Ca    8.3        13 Jan 2025 19:32  Ca    8.9        13 Jan 2025 10:51  Phos  3.9       14 Jan 2025 01:36  Phos  3.1       13 Jan 2025 19:32  Phos  2.7       13 Jan 2025 06:42  Mg     2.2       14 Jan 2025 01:36  Mg     2.0       13 Jan 2025 19:32  Mg     1.5       13 Jan 2025 06:42    TPro  7.7    /  Alb  3.9    /  TBili  1.2    /  DBili  x      /  AST  132    /  ALT  26     /  AlkPhos  394    14 Jan 2025 01:36  TPro  7.0    /  Alb  3.6    /  TBili  1.0    /  DBili  x      /  AST  107    /  ALT  19     /  AlkPhos  287    13 Jan 2025 19:32  TPro  8.1    /  Alb  4.0    /  TBili  1.0    /  DBili  x      /  AST  66     /  ALT  21     /  AlkPhos  166    13 Jan 2025 10:51    PT/INR - ( 14 Jan 2025 09:19 )   PT: 16.9 sec;   INR: 1.49 ratio         PTT - ( 14 Jan 2025 09:19 )  PTT:31.7 sec      Blood Culture: Organism --  Gram Stain Blood -- Gram Stain --  Specimen Source Clean Catch Clean Catch (Midstream)  Culture-Blood --    Organism --  Gram Stain Blood -- Gram Stain --  Specimen Source .Blood BLOOD  Culture-Blood --    Organism --  Gram Stain Blood -- Gram Stain --  Specimen Source Clean Catch Clean Catch (Midstream)  Culture-Blood --    Organism --  Gram Stain Blood -- Gram Stain --  Specimen Source .Blood BLOOD  Culture-Blood --    Organism --  Gram Stain Blood -- Gram Stain --  Specimen Source .Blood BLOOD  Culture-Blood --

## 2025-01-14 NOTE — PROGRESS NOTE ADULT - PROBLEM SELECTOR PLAN 3
Pt on home metoprolol 50mg BID  Cardiology consulted  1/12 pt hypertensive to 180s- Added lisinopril 20mg QD and HCTZ 25mg QD (pt's home meds) Lasix 40mg IV x1 given for fluid overload.  1/13 Per cardio recs, switched lopressor to coreg and increased lisinopril to 40mg

## 2025-01-14 NOTE — PROGRESS NOTE ADULT - PROBLEM SELECTOR PLAN 1
1/8/25 TM interpretation: Abnormal myeloblasts (93%), consistent w/ acute myeloid leukemia. FISH for PML-SOULEYMANE is negative. The overall findings are consistent with AML.    Monitor CBC w dif, CMP, TLS labs - replete electrolytes and transfuse blood products PRN  Consider rasburicase 3mg IV x 1 if uric acid >8, and 6mg IV x 1 if uric acid >12  IVF, daily weights, strict Is/Os, diuresis PRN  Mouth care, Antiemetics Continue allopurinol 300mg QD  S/p leukophoresis x2 on 1/8 and 1/9 1/8 BM bx: diffuse involvement by AML  1/10 Hydrea increased to 2000mg TID, Research RN meeting w/ pt to discuss study.  1/11 PICC ordered. WBC increasing, monitor on hydrea TID  1/12 Dacogen 20mg/m2 started- Venetoclax to start once WBC decrease.   1/13 Mylotarg started BUT NOT COMPLETED (only 10cc infused).    1/14 Family declines wanting any additional treatment. Treatment orders cancelled. GOC below.

## 2025-01-14 NOTE — PROGRESS NOTE ADULT - ASSESSMENT
73 y/o F with PMH breast cancer and colon cancer s/p resection and in remission, CAD s/p CABG x2, T2DM, gout, HTN, HLD directly admitted to MICU after presenting to [facility] w/ hyperleukocytosis w/ blast crises c/f new leukemia.    NEURO:   - currently at baseline A&O x4     PULM:  - no active issues at this time     CV:  #Elevated Troponin  - likely in setting of demand ischemia, pt w/o chest pain currently  - trop 57, EKG w/o changes  - trend trop to peak    #CAD s/p CABG (2010)   - home meds    GI:   - NPO for shiley placement  - no active issues     /RENAL:   #Hypokalemia  - replete for K>4, Phos >3, Mg >2    ID:   #Leukocytosis  - likely 2/2 to blast crisis  - will r/o infectious etiology w/ U/A, UCX, RVP, CXR, blood cx x2   - hold off on abx at this time  - trend fever curve     HEME/ONC:   #Leukostasis  #Blast crisis   #Acute leukemia   #Anemia  #Thrombocytopenia   #Breast ca  #Colon ca    - s/p left breast lumpectomy in 1999  - s/p resection in 2001   - uric acid 9.7, ddimer 17,476 .33, blasts 69% on admission; peripheral smear w/ almost entirely blast-like cells w/o Bran rods  - s/p shiley placement for emergent leukapheresis   - s/p 3mg IV rasburicase x1 in ED       - per heme if uric acid >8, give rasburicase x1; if >12 give 6mg IV x1  - c/w allopurinol 300mg qd for TLS ppx (uptitrated from home dose of 100mg QD)  - c/w hydroxyurea 2000 mg BID for cytoreduction  - per heme recs: trend cbc w/ diff, coags, fibrinogen clauss, d-dimer and TLS labs (uric acid, LDH, mg, phos) daily  - Heme onc following, recs appreciated  - maintain active type and screen  - transfuse for Hgb >7, Plt<10k, <20 w/ fever< 50k w/ bleed     #DVT Ppx:  - holding for shiley placement   73 y/o F with PMH breast cancer and colon cancer s/p resection and in remission, CAD s/p CABG x2, T2DM, gout, HTN, HLD directly admitted to MICU after presenting to [facility] w/ hyperleukocytosis w/ blast crises c/f new leukemia.    NEURO:   - currently at baseline A&O x4     PULM:  - no active issues at this time     CV:  #Elevated Troponin  - likely in setting of demand ischemia, pt w/o chest pain currently  - trop 57, EKG w/o changes  - trend trop to peak    #CAD s/p CABG (2010)   - home meds    GI:   - NPO for shiley placement  - no active issues     /RENAL:   #Hypokalemia  - replete for K>4, Phos >3, Mg >2    ID:   #Leukocytosis  - likely 2/2 to blast crisis  - will r/o infectious etiology w/ U/A, UCX, RVP, CXR, blood cx x2   - hold off on abx at this time  - trend fever curve     HEME/ONC:   #Leukostasis  #Blast crisis   #Acute leukemia   #Anemia  #Thrombocytopenia   #Breast ca  #Colon ca    - s/p left breast lumpectomy in 1999  - s/p resection in 2001   - uric acid 9.7, ddimer 17,476 .33, blasts 69% on admission; peripheral smear w/ almost entirely blast-like cells w/o Bran rods  - s/p shiley placement for emergent leukapheresis   - s/p 3mg IV rasburicase x1 in ED       - per heme if uric acid >8, give rasburicase x1; if >12 give 6mg IV x1  - c/w allopurinol 300mg qd for TLS ppx (uptitrated from home dose of 100mg QD)  - c/w hydroxyurea 2000 mg BID for cytoreduction  - per heme recs: trend cbc w/ diff, coags, fibrinogen clauss, d-dimer and TLS labs (uric acid, LDH, mg, phos) daily  - Heme onc following, recs appreciated  - maintain active type and screen  - transfuse for Hgb >7, Plt<10k, <20 w/ fever< 50k w/ bleed     #DVT Ppx:  - holding for shiley placement   75 y/o F with PMH breast cancer and colon cancer s/p resection and in remission, CAD s/p CABG x2, T2DM, gout, HTN, HLD directly admitted to MICU after presenting to [facility] w/ hyperleukocytosis w/ blast crises c/f new leukemia.    NEURO:   - currently at baseline A&O x4     PULM:  - Increased WOB; placed on BiPAP  - Suspect in the setting of Type B Lactic acidosis in the setting of AML  - Appears to be more comfortable on BiPAP    CV:  #Elevated Troponin  - likely in setting of demand ischemia, pt w/o chest pain currently  - trop 57, EKG w/o changes  - trend trop to peak    #CAD s/p CABG (2010)   - home meds    GI:   - NPO for shiley placement  - no active issues     /RENAL:   #Hypokalemia  - replete for K>4, Phos >3, Mg >2    ID:   #Leukocytosis  - likely 2/2 to blast crisis  - will r/o infectious etiology w/ U/A, UCX, RVP, CXR, blood cx x2   - hold off on abx at this time  - trend fever curve     HEME/ONC:   #Leukostasis  #Blast crisis   #Acute leukemia   #Anemia  #Thrombocytopenia   #Breast ca  #Colon ca    - s/p left breast lumpectomy in 1999  - s/p resection in 2001   - uric acid 9.7, ddimer 17,476 .33, blasts 69% on admission; peripheral smear w/ almost entirely blast-like cells w/o Bran rods  - s/p shiley placement for emergent leukapheresis   - s/p 3mg IV rasburicase x1 in ED       - per heme if uric acid >8, give rasburicase x1; if >12 give 6mg IV x1  - c/w allopurinol 300mg qd for TLS ppx (uptitrated from home dose of 100mg QD)  - c/w hydroxyurea 2000 mg BID for cytoreduction  - per heme recs: trend cbc w/ diff, coags, fibrinogen clauss, d-dimer and TLS labs (uric acid, LDH, mg, phos) daily  - Heme onc following, recs appreciated  - maintain active type and screen  - transfuse for Hgb >7, Plt<10k, <20 w/ fever< 50k w/ bleed     #DVT Ppx:  - holding for shiley placement   75 y/o F with PMH breast cancer and colon cancer s/p resection and in remission, CAD s/p CABG x2, T2DM, gout, HTN, HLD directly admitted to MICU after presenting to [facility] w/ hyperleukocytosis w/ blast crises c/f new leukemia.    NEURO:   - currently at baseline A&O x4     PULM:  - Increased WOB; placed on BiPAP  - Suspect in the setting of Type B Lactic acidosis in the setting of AML  - Oxygenation is adequate; will wean as tolerated    CV:  #Elevated Troponin  - likely in setting of demand ischemia, pt w/o chest pain currently  - trop 57, EKG w/o changes  - trend trop to peak    #CAD s/p CABG (2010)   - home meds    GI:   - NPO for shiley placement  - no active issues     /RENAL:   #Hypokalemia  - replete for K>4, Phos >3, Mg >2    ID:   #Leukocytosis  - likely 2/2 to blast crisis  - will r/o infectious etiology w/ U/A, UCX, RVP, CXR, blood cx x2   - hold off on abx at this time  - trend fever curve     HEME/ONC:   #Leukostasis  #Blast crisis   #Acute leukemia   #Anemia  #Thrombocytopenia   #Breast ca  #Colon ca    - s/p left breast lumpectomy in 1999  - s/p resection in 2001   - uric acid 9.7, ddimer 17,476 .33, blasts 69% on admission; peripheral smear w/ almost entirely blast-like cells w/o Bran rods  - s/p shiley placement for emergent leukapheresis   - s/p 3mg IV rasburicase x1 in ED       - per heme if uric acid >8, give rasburicase x1; if >12 give 6mg IV x1  - c/w allopurinol 300mg qd for TLS ppx (uptitrated from home dose of 100mg QD)  - c/w hydroxyurea 2000 mg BID for cytoreduction  - per heme recs: trend cbc w/ diff, coags, fibrinogen clauss, d-dimer and TLS labs (uric acid, LDH, mg, phos) daily  - transfuse for Hgb >7, Plt<10k, <20 w/ fever< 50k w/ bleed     #DVT Ppx:  - holding for shiley placement   75 y/o F with PMH breast cancer and colon cancer s/p resection and in remission, CAD s/p CABG x2, T2DM, gout, HTN, HLD directly admitted to MICU after presenting to [facility] w/ hyperleukocytosis w/ blast crises c/f new leukemia.    NEURO:   - currently at baseline A&O x4     PULM:  - Increased WOB; placed on BiPAP  - Suspect in the setting of Type B Lactic acidosis in the setting of AML  - Oxygenation is adequate; will wean as tolerated    CV:  #Elevated Troponin  - likely in setting of demand ischemia, pt w/o chest pain currently  - trop 57, EKG w/o changes  - trend trop to peak    #CAD s/p CABG (2010)   - home meds    GI:   - NPO for shiley placement  - no active issues     /RENAL:   #Hypokalemia  - replete for K>4, Phos >3, Mg >2    ID:   #Leukocytosis  - likely 2/2 to blast crisis  - will r/o infectious etiology w/ U/A, UCX, RVP, CXR, blood cx x2   - hold off on abx at this time  - trend fever curve     HEME/ONC:   #Leukostasis  #Blast crisis   #Acute leukemia   #Anemia  #Thrombocytopenia   #Breast ca  #Colon ca    - s/p left breast lumpectomy in 1999  - s/p resection in 2001   - uric acid 9.7, ddimer 17,476 .33, blasts 69% on admission; peripheral smear w/ almost entirely blast-like cells w/o Bran rods  - s/p shiley placement for emergent leukapheresis   - s/p 3mg IV rasburicase x1 in ED       - per heme if uric acid >8, give rasburicase x1; if >12 give 6mg IV x1  - c/w allopurinol 300mg qd for TLS ppx (uptitrated from home dose of 100mg QD)  - c/w hydroxyurea 2000 mg BID for cytoreduction  - per heme recs: trend cbc w/ diff, coags, fibrinogen clauss, d-dimer and TLS labs (uric acid, LDH, mg, phos) daily  - Pending further GOC with family   - transfuse for Hgb >7, Plt<10k, <20 w/ fever< 50k w/ bleed     #DVT Ppx:  - holding for shiley placement   73 y/o F with PMH breast cancer and colon cancer s/p resection and in remission, CAD s/p CABG x2, T2DM, gout, HTN, HLD directly admitted to MICU after presenting to [facility] w/ hyperleukocytosis w/ blast crises c/f new leukemia.    NEURO:   - currently at baseline A&O x4     PULM:  - Increased WOB; placed on BiPAP  - Suspect in the setting of Type B Lactic acidosis in the setting of AML  - Oxygenation is adequate; will wean as tolerated    CV:  #Elevated Troponin  - likely in setting of demand ischemia, pt w/o chest pain currently  - trop 57, EKG w/o changes  - trend trop to peak    #CAD s/p CABG (2010)   - home meds    GI:   - NPO for BiPAP  - no active issues     /RENAL:   #Hypokalemia  - replete for K>4, Phos >3, Mg >2    ID:   #Leukocytosis  - likely 2/2 to blast crisis  - will r/o infectious etiology w/ U/A, UCX, RVP, CXR, blood cx x2   - hold off on abx at this time  - trend fever curve     HEME/ONC:   #Leukostasis  #Blast crisis   #Acute leukemia   #Anemia  #Thrombocytopenia   #Breast ca  #Colon ca    - s/p left breast lumpectomy in 1999  - s/p resection in 2001   - uric acid 9.7, ddimer 17,476 .33, blasts 69% on admission; peripheral smear w/ almost entirely blast-like cells w/o Bran rods  - s/p shiley placement for emergent leukapheresis   - s/p 3mg IV rasburicase x1 in ED       - per heme if uric acid >8, give rasburicase x1; if >12 give 6mg IV x1  - c/w allopurinol 300mg qd for TLS ppx (uptitrated from home dose of 100mg QD)  - c/w hydroxyurea 2000 mg BID for cytoreduction  - per heme recs: trend cbc w/ diff, coags, fibrinogen clauss, d-dimer and TLS labs (uric acid, LDH, mg, phos) daily  - Pending further GOC with family   - transfuse for Hgb >7, Plt<10k, <20 w/ fever< 50k w/ bleed     #DVT Ppx:  - SCDs    #Ethics:  - Will need further discussion with family regarding Trialing Chemo vs full comfort measures, pending a discussion with Heme 75 y/o F with PMH breast cancer and colon cancer s/p resection and in remission, CAD s/p CABG x2, T2DM, gout, HTN, HLD directly admitted to MICU after presenting to [facility] w/ hyperleukocytosis w/ blast crises c/f new leukemia.    NEURO:   - currently at baseline A&O x4     PULM:  - Increased WOB; placed on BiPAP  - Suspect in the setting of Type B Lactic acidosis in the setting of AML  - Oxygenation is adequate; will wean as tolerated    CV:  #Elevated Troponin  - likely in setting of demand ischemia, pt w/o chest pain currently  - trop 57, EKG w/o changes  - trend trop to peak    #CAD s/p CABG (2010)   - home meds    GI:   - NPO for BiPAP  - no active issues     /RENAL:   #Hypokalemia  - replete for K>4, Phos >3, Mg >2    #Hyponatremia  - Trend BMP q6h  - Suspect likely SIADH vs hypovolemic hyponatremia;   - Patient NPO;   - Urine Studies limited given patient received fluids before studies collected  - Continue to monitor with q6h BMP    ID:   #Leukocytosis  - likely 2/2 to blast crisis  - will r/o infectious etiology w/ U/A, UCX, RVP, CXR, blood cx x2   - hold off on abx at this time  - trend fever curve     HEME/ONC:   #Leukostasis  #Blast crisis   #Acute leukemia   #Anemia  #Thrombocytopenia   #Breast ca  #Colon ca    - s/p left breast lumpectomy in 1999  - s/p resection in 2001   - uric acid 9.7, ddimer 17,476 .33, blasts 69% on admission; peripheral smear w/ almost entirely blast-like cells w/o Bran rods  - s/p shiley placement for emergent leukapheresis   - s/p 3mg IV rasburicase x1 in ED       - per heme if uric acid >8, give rasburicase x1; if >12 give 6mg IV x1  - c/w allopurinol 300mg qd for TLS ppx (uptitrated from home dose of 100mg QD)  - c/w hydroxyurea 2000 mg BID for cytoreduction  - per heme recs: trend cbc w/ diff, coags, fibrinogen clauss, d-dimer and TLS labs (uric acid, LDH, mg, phos) daily  - Pending further GOC with family   - transfuse for Hgb >7, Plt<10k, <20 w/ fever< 50k w/ bleed     #DVT Ppx:  - SCDs    #Ethics:  - Will need further discussion with family regarding Trialing Chemo vs full comfort measures, pending a discussion with Heme 73 y/o F with PMH breast cancer and colon cancer s/p resection and in remission, CAD s/p CABG x2, T2DM, gout, HTN, HLD directly admitted to MICU after presenting to [facility] w/ hyperleukocytosis w/ blast crises c/f new leukemia.    NEURO:   - currently at baseline A&O x4     PULM:  - Increased WOB; placed on BiPAP  - Suspect in the setting of Type B Lactic acidosis in the setting of AML  - Oxygenation is adequate; will wean as tolerated    CV:  #Elevated Troponin  - likely in setting of demand ischemia, pt w/o chest pain currently  - trop 57, EKG w/o changes  - trend trop to peak    #CAD s/p CABG (2010)   - home meds    GI:   - NPO for BiPAP  - no active issues     /RENAL:   #Hypokalemia  - replete for K>4, Phos >3, Mg >2    #Hyponatremia  - Trend BMP q6h  - Suspect likely SIADH vs hypovolemic hyponatremia;   - Patient NPO;   - Urine Studies limited given patient received fluids before studies collected  - Continue to monitor with q6h BMP  - Max Sodium correction today 132    ID:   #Leukocytosis  - likely 2/2 to blast crisis  - will r/o infectious etiology w/ U/A, UCX, RVP, CXR, blood cx x2   - hold off on abx at this time  - trend fever curve     HEME/ONC:   #Leukostasis  #Blast crisis   #Acute leukemia   #Anemia  #Thrombocytopenia   #Breast ca  #Colon ca    - s/p left breast lumpectomy in 1999  - s/p resection in 2001   - uric acid 9.7, ddimer 17,476 .33, blasts 69% on admission; peripheral smear w/ almost entirely blast-like cells w/o Bran rods  - s/p shiley placement for emergent leukapheresis   - s/p 3mg IV rasburicase x1 in ED       - per heme if uric acid >8, give rasburicase x1; if >12 give 6mg IV x1  - c/w allopurinol 300mg qd for TLS ppx (uptitrated from home dose of 100mg QD)  - c/w hydroxyurea 2000 mg BID for cytoreduction  - per heme recs: trend cbc w/ diff, coags, fibrinogen clauss, d-dimer and TLS labs (uric acid, LDH, mg, phos) daily  - Pending further GOC with family   - transfuse for Hgb >7, Plt<10k, <20 w/ fever< 50k w/ bleed     #DVT Ppx:  - SCDs    #Ethics:  - Will need further discussion with family regarding Trialing Chemo vs full comfort measures, pending a discussion with Heme

## 2025-01-14 NOTE — PROGRESS NOTE ADULT - NS ATTEND AMEND GEN_ALL_CORE FT
.  Primary: Goldberg    Vital Signs Last 24 Hrs  T(C): 37.9 (14 Jan 2025 04:00), Max: 39.6 (14 Jan 2025 01:14)  T(F): 100.2 (14 Jan 2025 04:00), Max: 103.3 (14 Jan 2025 01:14)  HR: 96 (14 Jan 2025 06:00) (74 - 119)  BP: 155/69 (14 Jan 2025 06:00) (126/54 - 231/118)  BP(mean): 99 (14 Jan 2025 06:00) (96 - 160)  RR: 32 (14 Jan 2025 06:00) (18 - 56)  SpO2: 100% (14 Jan 2025 06:00) (94% - 100%)    Parameters below as of 14 Jan 2025 01:14  Patient On (Oxygen Delivery Method): BiPAP/CPAP    MEDICATIONS  (STANDING):  allopurinol 300 milliGRAM(s) Oral daily  Biotene Dry Mouth Oral Rinse 15 milliLiter(s) Swish and Spit three times a day  carvedilol 6.25 milliGRAM(s) Oral every 12 hours  cefepime   IVPB 2000 milliGRAM(s) IV Intermittent every 8 hours  cefepime   IVPB      chlorhexidine 2% Cloths 1 Application(s) Topical <User Schedule>  chlorhexidine 4% Liquid 1 Application(s) Topical <User Schedule>  decitabine IVPB (eMAR) 40 milliGRAM(s) IV Intermittent every 24 hours  dextrose 5%. 1000 milliLiter(s) (100 mL/Hr) IV Continuous <Continuous>  dextrose 5%. 1000 milliLiter(s) (50 mL/Hr) IV Continuous <Continuous>  dextrose 50% Injectable 25 Gram(s) IV Push once  dextrose 50% Injectable 12.5 Gram(s) IV Push once  dextrose 50% Injectable 25 Gram(s) IV Push once  glucagon  Injectable 1 milliGRAM(s) IntraMuscular once  hydrochlorothiazide 25 milliGRAM(s) Oral daily  hydroxyurea 2000 milliGRAM(s) Oral three times a day  insulin lispro (ADMELOG) corrective regimen sliding scale   SubCutaneous every 6 hours  lisinopril 40 milliGRAM(s) Oral daily  ondansetron Injectable 8 milliGRAM(s) IV Push every 24 hours  polyethylene glycol 3350 17 Gram(s) Oral daily  senna 2 Tablet(s) Oral at bedtime  sodium chloride 0.9%. 1000 milliLiter(s) (50 mL/Hr) IV Continuous <Continuous>  valACYclovir 500 milliGRAM(s) Oral every 12 hours      Assessment: 74 year old day 3 cycle 1 decitabine for treatment related FLT-3 + AML.    Course complicated by hypertensive episode during GO infusion last night (only receiving a partial dose).  Course further complicated by leukemia induced electrotype disturbances and Bi-pap requiring MICU transfer.   Course complicated by fever (active)     Pmhx: left side breast cancer (s/p lumpectomy, RT and chemo), colon cancer (s/p resection), CAD (s/p CABG x2), DM2, gout, HTN, HLD     Heme:  Re-dose gemtuzumab  Continue Allopurinol  continue decitabine  Start venetoclax when WBC < 25,000  tumor lysis labs q6     ID: Cefepime (1/9- ), valtrex.  start caspo     Radiographs (1/13/24): chest CT and head CT both negative     Nutrition: tolerating PO    DVT prophylaxis: ambulation.    Over 60 minutes were spent in direct patient care and care coordination.

## 2025-01-14 NOTE — PROGRESS NOTE ADULT - ASSESSMENT
Ellie is a 75 y/o F PMH breast cancer in remission('99, '23), colon cancer '01 s/p resection in remission, HTN, HLD, DM2, CAD status post CABG @ [facility] &, vertigo.    Presents to [facility] 1/7 c/o generalized back pain x 3 months with some abdominal discomfort over the past 1 week. She has been feeling some profound weakness and fatigue and has been unable to carry on ADLs.  Over the past 3 days or so she has had some dyspnea on exertion. Patient is having some left upper leg discomfort.      - mild troponin elevation, though no suggestion of acs (Trop I 143--> Trop T 57)  - ekg with sr, rbbb, nsst abn  - TTE with normal BiV function, no WMA noted.   - without cp but feeling very SOB this AM  - Appears dry on exam  - CXR from 1/12 with clear lung fields  - Significant anemia noted on labs this AM likely contributing to SOB    # AML  - found to have significantly elevated WBC at >280k, s/p leukapheresis   - troponin elevation 2/2 demand ischemia in the setting of new leukemia, likely AML. S/p BMB as well.   - Heme onc following. S/p RIJ catheter placed s/p therapeutic leukapheresis   - bnp elevated, but appears dry on exam  - Now febrile, hypertensive. Abx as per primary   - now concern for DIC possible TLS, upgraded to MICU  - discussed with family at the bedside, they would like to focus on comfort. Rec Pall care consult this AM.   - Pt does not want chemotherapy     #HTN: Now on HCTZ, Lisinopril   - Lisinopril increased to 40mg QD  - Would stop HCTZ given how dry she is and place on Hydralazine tid for tighter bp control   - Now on Coreg BID, increase as needed for bp control    #CAD s/p CABG:  -Chronic stable ischemic heart disease  - statin on hold given transaminitis   - On coreg as above  - ASA on hold given significant anemia, thrombocytopenia

## 2025-01-14 NOTE — PHARMACOTHERAPY INTERVENTION NOTE - COMMENTS
Clinical Pharmacy Specialist- Hematology/Oncology- Progress Note    Pt is a 73 y/o female with PMHx breast cancer (s/p lumpectomy/xrt/chemo in 1999 w/ local recurrence in 2023 s/p resection), colon ca (s/p resection in 2021), CAD (s/p CABG x2 in 201), HTN, HLD and T2DM, and newly diagnosed AML starting indiction therapy with Decitabine + venetoclax (to be added once cytoreduced), and Mylotarg for cytoreduction    Antimicrobial Course:  - Vancomycin- 1/10 x 1  - Cefepime- 1/9-  - Valtrex- 1/13  MRSA nasal swab    Last Neutropenic (ANC<1000): no occurrence  Last Febrile:  1/14@09:00; T= 101.7  (Tmax=)   Days Non-Neutropenic: >7  Days afebrile: 0    Chemotherapy Course  -Current Regimen: Decitabine + Venetoclax (mandie to be added later)  History:  (1/12/25) C1  -Decitabine 20mg/m2 IVPB D1-5- started 1/12  -Venetoclax PO daily (to be added later)  -Day: 1? (1/14)  BmBx:  Access: DL SOLO PICC (1/12)    History/Relevant clinical information used in assessment:  - Ht= 5'5"; IBW= 57kg; Adj BW= 71kg  - 1/13- Crcl= 49ml/min; Scr=1.11; Wt= 71kg ; Adjusted BW used since pt BMI>30/  - sulfa allergy- tolerates lasixl pcn allergy- tolerates cefepime    Assessment/Plan/Recommendation:  - discussed for cytoreduction -started mylotarg (capped at 4.5mg) x 1 1/13@6:30p; stopped d/t RRT- only received ~6.7ml of drug (~13%)- would recommend to repeat full dose again     - will add posaconazole later  - venetoclax to be added once WBC   - CT chest 1/11- on cefepime  - on hydrea 2gm TID  - cards-on HCTZ, Lisinopril 40mg daily, would consider amlodipine over HCTZ;  lopressor switched to carvedilol 6.25mg q2hr (/91)- when venetoclax starts, if hypotensive, option to switch to labetalol to avoid DDI, pravastatin ok w/ venetoclax    Additional Monitoring Needed?   -Yes- Continue to monitor renal function & daily counts for abx escalation/de-escalation   -Discharge Planning:  --> New meds: coreg (was on metoprolol), lisinopril 40mg  --> Meds sent for auth:  --> Delivered meds:    Case discussed with attending/primary team    Joshua Brown, PharmD, BCPS  Clinical Pharmacy Specialist | Hematology/Oncology  [facility]  Email: [email or web address] or available on [place] Clinical Pharmacy Specialist- Hematology/Oncology- Progress Note    Pt is a 75 y/o female with PMHx breast cancer (s/p lumpectomy/xrt/chemo in 1999 w/ local recurrence in 2023 s/p resection), colon ca (s/p resection in 2021), CAD (s/p CABG x2 in 201), HTN, HLD and T2DM, and newly diagnosed AML starting indiction therapy with Decitabine + venetoclax (to be added once cytoreduced), and Mylotarg for cytoreduction    Antimicrobial Course:  - Vancomycin- 1/10 x 1  - Cefepime- 1/9-  - Valtrex- 1/13  MRSA nasal swab    Last Neutropenic (ANC<1000): 1/14; ANC= 0.71  Last Febrile:  1/14@10:00; T= 101.1  (Cnbf=952.3; 1/14@02:00)   Days Non-Neutropenic: >7  Days afebrile: 0    Chemotherapy Course  -Current Regimen: Decitabine + Venetoclax (mandie to be added later)  History:  (1/12/25) C1  -Decitabine 20mg/m2 IVPB D1-5- started 1/12  -Venetoclax PO daily (to be added later)  -Day: 1 (1/13)- decit only  BmBx:  Access: DL SOLO PICC (1/12)    History/Relevant clinical information used in assessment:  - Ht= 5'5"; IBW= 57kg; Adj BW= 71kg  - 1/13- Crcl= 49ml/min; Scr=1.11; Wt= 71kg ; Adjusted BW used since pt BMI>30  - 1/14- Crcl= 35ml/min; Scr=1.55; Wt= 71kg ; Adjusted BW used since pt BMI>30  - sulfa allergy- tolerates lasixl pcn allergy- tolerates cefepime    Assessment/Plan/Recommendation:  - recommend to decrease cefepime to 2gm q12hr, alternative ot lisinopril? option to decrease allopurinol to 100mg daily (UA= 2.2)- pt in stage 1 GODFREY  - discussed for cytoreduction -started mylotarg (capped at 4.5mg) x 1 1/13@6:30p; stopped d/t RRT- only received ~6.7ml of drug (~13%)- would recommend to repeat full dose again  - pt family declined further treatment   - will add posaconazole later  - CT chest 1/11- on cefepime  - on hydrea 2gm TID  - cards-on HCTZ, Lisinopril 40mg daily, would consider amlodipine over HCTZ;  lopressor switched to carvedilol 6.25mg q2hr (/91)    Additional Monitoring Needed?   -Yes- Continue to monitor renal function & daily counts for abx escalation/de-escalation   -Discharge Planning:  --> New meds: coreg (was on metoprolol), lisinopril 40mg  --> Meds sent for auth:  --> Delivered meds:    Case discussed with attending/primary team    Joshua Brown, PharmD, BCPS  Clinical Pharmacy Specialist | Hematology/Oncology  [facility]  Email: [email or web address] or available on [employer] Clinical Pharmacy Specialist- Hematology/Oncology- Progress Note    Pt is a 73 y/o female with PMHx breast cancer (s/p lumpectomy/xrt/chemo in 1999 w/ local recurrence in 2023 s/p resection), colon ca (s/p resection in 2021), CAD (s/p CABG x2 in 201), HTN, HLD and T2DM, and newly diagnosed AML starting indiction therapy with Decitabine + venetoclax (to be added once cytoreduced), and Mylotarg for cytoreduction    Antimicrobial Course:  - Vancomycin- 1/10 x 1  - Cefepime- 1/9-  - Valtrex- 1/13  MRSA nasal swab    Last Neutropenic (ANC<1000): 1/14; ANC= 0.71  Last Febrile:  1/14@10:00; T= 101.1  (Rfnu=179.3; 1/14@02:00)   Days Non-Neutropenic: >7  Days afebrile: 0    Chemotherapy Course  -Current Regimen: Decitabine + Venetoclax (mandie to be added later)  History:  (1/12/25) C1  -Decitabine 20mg/m2 IVPB D1-5- started 1/12  -Venetoclax PO daily (to be added later)  -Day: 1 (1/13)- decit only  BmBx:  Access: DL SOLO PICC (1/12)    History/Relevant clinical information used in assessment:  - Ht= 5'5"; IBW= 57kg; Adj BW= 71kg  - 1/13- Crcl= 49ml/min; Scr=1.11; Wt= 71kg ; Adjusted BW used since pt BMI>30  - 1/14- Crcl= 35ml/min; Scr=1.55; Wt= 71kg ; Adjusted BW used since pt BMI>30  - sulfa allergy- tolerates lasixl pcn allergy- tolerates cefepime    Assessment/Plan/Recommendation:  - recommend to decrease cefepime to 2gm q12hr, alternative to lisinopril? option to decrease allopurinol to 100mg daily (UA= 2.2)- pt in stage 1 GODFREY  - discussed for cytoreduction -started mylotarg (capped at 4.5mg) x 1 1/13@6:30p; stopped d/t RRT- only received ~6.7ml of drug (~13%)- would recommend to repeat full dose again  - pt family declined further treatment   - will add posaconazole later  - CT chest 1/11- on cefepime  - on hydrea 2gm TID  - cards-on HCTZ, Lisinopril 40mg daily, would consider amlodipine over HCTZ;  lopressor switched to carvedilol 6.25mg q2hr (/91)    Additional Monitoring Needed?   -Yes- Continue to monitor renal function & daily counts for abx escalation/de-escalation   -Discharge Planning:  --> New meds: coreg (was on metoprolol), lisinopril 40mg  --> Meds sent for auth:  --> Delivered meds:    Case discussed with attending/primary team    Joshua Brown, PharmD, BCPS  Clinical Pharmacy Specialist | Hematology/Oncology  Health system  Email: [email or web address] or available on [employer]

## 2025-01-14 NOTE — PROGRESS NOTE ADULT - ASSESSMENT
74F with pmhx left side breast cancer (s/p lumpectomy, RT and chemo), colon cancer (s/p resection), CAD (s/p CABG x2), DM2, gout, HTN, HLD who presented initially to [facility] with lower back pain and transferred to [facility] for hyperleukocytosis with reported blasts concerning for newly diagnosed acute myeloid leukemia. Patient presenting with .33 and blast 69% noted on manual differential. Cycle 1 dacogen started on 1/12, mylotarg given on 1/13 (not completed, only 10cc given).

## 2025-01-15 NOTE — CONSULT NOTE ADULT - PROBLEM SELECTOR RECOMMENDATION 4
- transition IV ativan to 0.5mg q1hr PRN anxiety/agitation   - Monitor for constipation, urinary retention, pain, hunger, thirst, etc.  Promote sleep wake cycle and reorientation as indicated. - transition IV ativan to 0.25mg q1hr PRN anxiety/agitation   - Monitor for constipation, urinary retention, pain, hunger, thirst, etc.  Promote sleep wake cycle and reorientation as indicated.

## 2025-01-15 NOTE — PROGRESS NOTE ADULT - PROBLEM SELECTOR PLAN 5
Holding pharmacological dvt ppx due to thrombocytopenia  OOB/encourage ambulation  miralax QD, senna @[facility]
GOC 1/14/25: Family declines wanting any additional treatment. Discussed treatment options at length with HCP (Son), and that symptoms likely to improve after controlling WBC counts / leukostasis, but both HCP (pt's son and pt's sister) discussed with patient and patient's  - do not want any additional treatment. Treatment orders cancelled.  - Palliative care consult  - GOC discussion, code status
GOC 1/14/25: Family declines wanting any additional treatment. Discussed treatment options at length with HCP (Son), and that symptoms likely to improve after controlling WBC counts / leukostasis, but both HCP (pt's son and pt's sister) discussed with patient and patient's  - do not want any additional treatment. Treatment orders cancelled.  - Palliative care consult  - GOC discussion, code status
Holding pharmacological dvt ppx due to thrombocytopenia  OOB/encourage ambulation  miralax QD, senna @[facility]

## 2025-01-15 NOTE — DISCHARGE NOTE FOR THE EXPIRED PATIENT - NS PATIENT DEATH CRITERIA
Patient is dead based on Cardiopulmonary criteria including absent breath sounds, pulselessness and/or asystole
Yes - the patient is able to be screened

## 2025-01-15 NOTE — PROGRESS NOTE ADULT - SUBJECTIVE AND OBJECTIVE BOX
INCOMPLETE NOTE    Mario Amanda | PGY3| Pager: [place] [phone]) -- HUAN ([phone])  Interval Events:    REVIEW OF SYSTEMS:  CONSTITUTIONAL: No weakness, fevers or chills  EYES/ENT: No visual changes;  No vertigo or throat pain   NECK: No pain or stiffness  RESPIRATORY: No cough, wheezing, hemoptysis; No shortness of breath  CARDIOVASCULAR: No chest pain or palpitations  GASTROINTESTINAL: No abdominal or epigastric pain. No nausea, vomiting, or hematemesis; No diarrhea or constipation. No melena or hematochezia.  GENITOURINARY: No dysuria, frequency or hematuria  NEUROLOGICAL: No numbness or weakness  SKIN: No itching, burning, rashes, or lesions   All other review of systems is negative unless indicated above.    OBJECTIVE:  ICU Vital Signs Last 24 Hrs  T(C): 38.6 (15 Morgan 2025 04:00), Max: 39 (14 Jan 2025 20:00)  T(F): 101.5 (15 Morgan 2025 04:00), Max: 102.2 (14 Jan 2025 20:00)  HR: 97 (15 Morgan 2025 04:00) (71 - 152)  BP: 146/63 (15 Morgan 2025 04:00) (103/44 - 199/81)  BP(mean): 91 (15 Morgan 2025 04:00) (64 - 116)  ABP: --  ABP(mean): --  RR: 32 (15 Morgan 2025 04:00) (28 - 58)  SpO2: 93% (15 Morgan 2025 04:00) (81% - 100%)    O2 Parameters below as of 14 Jan 2025 19:00  Patient On (Oxygen Delivery Method): BiPAP/CPAP    O2 Concentration (%): 50          01-14 @ 07:01  -  01-15 @ 07:00  --------------------------------------------------------  IN: 2950 mL / OUT: 375 mL / NET: 2575 mL      CAPILLARY BLOOD GLUCOSE      POCT Blood Glucose.: 218 mg/dL (15 Morgan 2025 05:05)      PHYSICAL EXAM:  General: WN/WD NAD  Neurology: A&Ox3, nonfocal, SMITH x 4  Eyes: PERRLA/ EOMI, Gross vision intact  ENT/Neck: Neck supple, trachea midline, No JVD, Gross hearing intact  Respiratory: CTA B/L, No wheezing, rales, rhonchi  CV: RRR, +S1/S2, -S3/S4, no murmurs, rubs or gallops  Abdominal: Soft, NT, ND +BS, No HSM  MSK: 5/5 strength UE/LE bilaterally  Extremities: No edema, 2+ peripheral pulses  Skin: No Rashes, Hematoma, Ecchymosis  Incisions:   Tubes:    HOSPITAL MEDICATIONS:  MEDICATIONS  (STANDING):  allopurinol 100 milliGRAM(s) Oral daily  Biotene Dry Mouth Oral Rinse 15 milliLiter(s) Swish and Spit three times a day  caspofungin IVPB 50 milliGRAM(s) IV Intermittent every 24 hours  caspofungin IVPB      cefepime   IVPB 2000 milliGRAM(s) IV Intermittent every 12 hours  chlorhexidine 2% Cloths 1 Application(s) Topical <User Schedule>  chlorhexidine 4% Liquid 1 Application(s) Topical <User Schedule>  hydrochlorothiazide 25 milliGRAM(s) Oral daily  hydroxyurea 2000 milliGRAM(s) Oral three times a day  insulin lispro (ADMELOG) corrective regimen sliding scale   SubCutaneous every 6 hours  insulin NPH human recombinant 3 Unit(s) SubCutaneous every 6 hours  labetalol Injectable 10 milliGRAM(s) IV Push every 6 hours  lisinopril 40 milliGRAM(s) Oral daily  polyethylene glycol 3350 17 Gram(s) Oral daily  senna 2 Tablet(s) Oral at bedtime  valACYclovir 500 milliGRAM(s) Oral every 12 hours    MEDICATIONS  (PRN):  oxyCODONE    IR 5 milliGRAM(s) Oral every 6 hours PRN Moderate Pain (4 - 6)  sodium chloride 0.9% lock flush 10 milliLiter(s) IV Push every 1 hour PRN Pre/post blood products, medications, blood draw, and to maintain line patency      LABS:                        7.9    74.93 )-----------( 14       ( 15 Morgan 2025 04:48 )             23.0     Hgb Trend: 7.9<--, 8.3<--, 8.1<--, 8.0<--, 5.5<--  01-15    122[L]  |  90[L]  |  79[H]  ----------------------------<  226[H]  5.5[H]   |  17[L]  |  2.85[H]    Ca    8.4      15 Morgan 2025 04:48  Phos  6.0     01-15  Mg     2.1     01-15    TPro  6.6  /  Alb  2.9[L]  /  TBili  0.9  /  DBili  x   /  AST  92[H]  /  ALT  31  /  AlkPhos  392[H]  01-15    Creatinine Trend: 2.85<--, 2.68<--, 2.26<--, 1.55<--, 2.09<--, 1.66<--  PT/INR - ( 14 Jan 2025 22:33 )   PT: 15.8 sec;   INR: 1.39 ratio         PTT - ( 14 Jan 2025 22:33 )  PTT:32.3 sec  Urinalysis Basic - ( 15 Morgan 2025 04:48 )    Color: x / Appearance: x / SG: x / pH: x  Gluc: 226 mg/dL / Ketone: x  / Bili: x / Urobili: x   Blood: x / Protein: x / Nitrite: x   Leuk Esterase: x / RBC: x / WBC x   Sq Epi: x / Non Sq Epi: x / Bacteria: x      Arterial Blood Gas:  01-14 @ 15:40  7.35/37/113/20/99.5/-4.8  ABG lactate: --  Arterial Blood Gas:  01-14 @ 01:20  7.37/38/71/22/95.9/-3.0  ABG lactate: --  Arterial Blood Gas:  01-13 @ 19:50  7.40/31/121/19/98.8/-5.1  ABG lactate: --    Venous Blood Gas:  01-14 @ 22:20  7.33/42/39/22/63.4  VBG Lactate: 1.9  Venous Blood Gas:  01-14 @ 09:10  7.37/39/29/22/52.8  VBG Lactate: 4.0  Venous Blood Gas:  01-13 @ 22:46  7.28/50/38/24/56.6  VBG Lactate: 7.2  Venous Blood Gas:  01-13 @ 19:24  7.32/42/29/22/47.1  VBG Lactate: 6.9      MICROBIOLOGY:     Culture - Blood (collected 13 Jan 2025 19:55)  Source: .Blood BLOOD  Preliminary Report (15 Morgan 2025 01:02):    No growth at 24 hours    Culture - Blood (collected 13 Jan 2025 19:40)  Source: .Blood BLOOD  Preliminary Report (15 Morgan 2025 01:02):    No growth at 24 hours    Culture - Urine (collected 12 Jan 2025 20:40)  Source: Clean Catch Clean Catch (Midstream)  Final Report (13 Jan 2025 23:42):    No growth    Culture - Blood (collected 12 Jan 2025 18:45)  Source: .Blood BLOOD  Preliminary Report (14 Jan 2025 22:02):    No growth at 48 Hours           Mario Patel | PGY3| Pager: [place] [phone]) -- HUAN ([phone])  Interval Events: Overnight; patient was given Amio for tachycardia, Tylenol for fever, and 1U Plt for thrombocytopenia in the setting of fever. Had GOC discussion with family; confirmed DNR/DNI and Comfort care. Patient was comfortable overnight.     REVIEW OF SYSTEMS:  All other review of systems is negative unless indicated above.    OBJECTIVE:  ICU Vital Signs Last 24 Hrs  T(C): 38.6 (15 Morgan 2025 04:00), Max: 39 (14 Jan 2025 20:00)  T(F): 101.5 (15 Morgan 2025 04:00), Max: 102.2 (14 Jan 2025 20:00)  HR: 97 (15 Morgan 2025 04:00) (71 - 152)  BP: 146/63 (15 Morgan 2025 04:00) (103/44 - 199/81)  BP(mean): 91 (15 Morgan 2025 04:00) (64 - 116)  ABP: --  ABP(mean): --  RR: 32 (15 Morgan 2025 04:00) (28 - 58)  SpO2: 93% (15 Morgan 2025 04:00) (81% - 100%)    O2 Parameters below as of 14 Jan 2025 19:00  Patient On (Oxygen Delivery Method): BiPAP/CPAP    O2 Concentration (%): 50          01-14 @ 07:01  -  01-15 @ 07:00  --------------------------------------------------------  IN: 2950 mL / OUT: 375 mL / NET: 2575 mL      CAPILLARY BLOOD GLUCOSE    POCT Blood Glucose.: 218 mg/dL (15 Morgan 2025 05:05)      PHYSICAL EXAM:  General: WN/WD NAD  Neurology: A&Ox4, nonfocal, SMITH x 4  Eyes: PERRLA/ EOMI, Gross vision intact  ENT/Neck: Neck supple, trachea midline, No JVD, Gross hearing intact  Respiratory: CTA B/L comfortable on BiPAP  CV: RRR, +S1/S2, -S3/S4, no murmurs, rubs or gallops  Abdominal: Soft, NT, ND +BS, No HSM  Extremities: No edema, 2+ peripheral pulses  Skin: No Rashes, Hematoma, Ecchymosis      HOSPITAL MEDICATIONS:  MEDICATIONS  (STANDING):  allopurinol 100 milliGRAM(s) Oral daily  Biotene Dry Mouth Oral Rinse 15 milliLiter(s) Swish and Spit three times a day  caspofungin IVPB 50 milliGRAM(s) IV Intermittent every 24 hours  caspofungin IVPB      cefepime   IVPB 2000 milliGRAM(s) IV Intermittent every 12 hours  chlorhexidine 2% Cloths 1 Application(s) Topical <User Schedule>  chlorhexidine 4% Liquid 1 Application(s) Topical <User Schedule>  hydrochlorothiazide 25 milliGRAM(s) Oral daily  hydroxyurea 2000 milliGRAM(s) Oral three times a day  insulin lispro (ADMELOG) corrective regimen sliding scale   SubCutaneous every 6 hours  insulin NPH human recombinant 3 Unit(s) SubCutaneous every 6 hours  labetalol Injectable 10 milliGRAM(s) IV Push every 6 hours  lisinopril 40 milliGRAM(s) Oral daily  polyethylene glycol 3350 17 Gram(s) Oral daily  senna 2 Tablet(s) Oral at bedtime  valACYclovir 500 milliGRAM(s) Oral every 12 hours    MEDICATIONS  (PRN):  oxyCODONE    IR 5 milliGRAM(s) Oral every 6 hours PRN Moderate Pain (4 - 6)  sodium chloride 0.9% lock flush 10 milliLiter(s) IV Push every 1 hour PRN Pre/post blood products, medications, blood draw, and to maintain line patency      LABS:                        7.9    74.93 )-----------( 14       ( 15 Morgan 2025 04:48 )             23.0     Hgb Trend: 7.9<--, 8.3<--, 8.1<--, 8.0<--, 5.5<--  01-15    122[L]  |  90[L]  |  79[H]  ----------------------------<  226[H]  5.5[H]   |  17[L]  |  2.85[H]    Ca    8.4      15 Morgan 2025 04:48  Phos  6.0     01-15  Mg     2.1     01-15    TPro  6.6  /  Alb  2.9[L]  /  TBili  0.9  /  DBili  x   /  AST  92[H]  /  ALT  31  /  AlkPhos  392[H]  01-15    Creatinine Trend: 2.85<--, 2.68<--, 2.26<--, 1.55<--, 2.09<--, 1.66<--  PT/INR - ( 14 Jan 2025 22:33 )   PT: 15.8 sec;   INR: 1.39 ratio         PTT - ( 14 Jan 2025 22:33 )  PTT:32.3 sec  Urinalysis Basic - ( 15 Morgan 2025 04:48 )    Color: x / Appearance: x / SG: x / pH: x  Gluc: 226 mg/dL / Ketone: x  / Bili: x / Urobili: x   Blood: x / Protein: x / Nitrite: x   Leuk Esterase: x / RBC: x / WBC x   Sq Epi: x / Non Sq Epi: x / Bacteria: x      Arterial Blood Gas:  01-14 @ 15:40  7.35/37/113/20/99.5/-4.8  ABG lactate: --  Arterial Blood Gas:  01-14 @ 01:20  7.37/38/71/22/95.9/-3.0  ABG lactate: --  Arterial Blood Gas:  01-13 @ 19:50  7.40/31/121/19/98.8/-5.1  ABG lactate: --    Venous Blood Gas:  01-14 @ 22:20  7.33/42/39/22/63.4  VBG Lactate: 1.9  Venous Blood Gas:  01-14 @ 09:10  7.37/39/29/22/52.8  VBG Lactate: 4.0  Venous Blood Gas:  01-13 @ 22:46  7.28/50/38/24/56.6  VBG Lactate: 7.2  Venous Blood Gas:  01-13 @ 19:24  7.32/42/29/22/47.1  VBG Lactate: 6.9      MICROBIOLOGY:     Culture - Blood (collected 13 Jan 2025 19:55)  Source: .Blood BLOOD  Preliminary Report (15 Morgan 2025 01:02):    No growth at 24 hours    Culture - Blood (collected 13 Jan 2025 19:40)  Source: .Blood BLOOD  Preliminary Report (15 Morgan 2025 01:02):    No growth at 24 hours    Culture - Urine (collected 12 Jan 2025 20:40)  Source: Clean Catch Clean Catch (Midstream)  Final Report (13 Jan 2025 23:42):    No growth    Culture - Blood (collected 12 Jan 2025 18:45)  Source: .Blood BLOOD  Preliminary Report (14 Jan 2025 22:02):    No growth at 48 Hours

## 2025-01-15 NOTE — PROVIDER CONTACT NOTE (OTHER) - ASSESSMENT
pt A&Ox4, no c/o of pain, temp 101
No response to external stimuli  No spontaneous respirations  No apical heart rate  Negative papillary response to light
Patient hypertensive 187/85 , , RR 20, 96% 2L NC pt has labored breathing.
NAD noted, pt denies any headache or blurred vision, pt c/o 10/10 pain generalized
Pt AOx2, /68, , T 99.7F, 96% on 2L NC, RR 20, due for mylotarg
Pt AOx2, increased confusion and lethargy per patient family. pt s/p Bendaryl 50mg IV push x1 dose as premedication for Mylotarg, patient has DACOGEN running, /82, HR 97, RR 20, T 100.2F, O2 95% on 2L NC.

## 2025-01-15 NOTE — PROGRESS NOTE ADULT - PROBLEM SELECTOR PROBLEM 1
Acute myeloid leukemia

## 2025-01-15 NOTE — PROGRESS NOTE ADULT - TIME BILLING
- Review of records, telemetry, vital signs and daily labs.   - General and cardiovascular physical examination.  - Generation of cardiovascular treatment plan and completion of note .  - Coordination of care.      Patient was seen and examined by me on  01/11/2025 ,interim events noted,labs and radiology studies reviewed.  Juanito Awan MD,FACC.  [address] [place], [place] [id number].  [phone]  Availabe to call or text on [employer] TEAMS.
- Review of records, telemetry, vital signs and daily labs.   - General and cardiovascular physical examination.  - Generation of cardiovascular treatment plan and completion of note .  - Coordination of care.      Patient was seen and examined by me on  01/12/2025 ,interim events noted,labs and radiology studies reviewed.  Juanito Awan MD,FACC.  [address] [place], [place] [id number].  [phone]  Availabe to call or text on [employer] TEAMS.
chart and data review, clinical assessment, and coordination of care. This excludes any time spent on separate procedures or teaching.

## 2025-01-15 NOTE — PROGRESS NOTE ADULT - SUBJECTIVE AND OBJECTIVE BOX
[facility] - Blessing Moore, Jewel Morales Savella, Cohen  Office Number:  [phone]    Patient resting comfortably in bed in NAD.    on bipap  af with rvr overnight, got iv amio and iv BBs  family at bedside    ROS: negative unless otherwise mentioned.    Telemetry:      MEDICATIONS  (STANDING):  chlorhexidine 2% Cloths 1 Application(s) Topical <User Schedule>  chlorhexidine 4% Liquid 1 Application(s) Topical <User Schedule>  labetalol Injectable 10 milliGRAM(s) IV Push every 6 hours  polyethylene glycol 3350 17 Gram(s) Oral daily  senna 2 Tablet(s) Oral at bedtime    MEDICATIONS  (PRN):  HYDROmorphone  Injectable 0.5 milliGRAM(s) IV Push every 4 hours PRN Moderate pain (4-6), Severe pain (7-10), Respiratory rate greater than 22  LORazepam   Injectable 0.5 milliGRAM(s) IV Push every 4 hours PRN Anxiety  ondansetron Injectable 4 milliGRAM(s) IV Push every 6 hours PRN Nausea and/or Vomiting  oxyCODONE    IR 5 milliGRAM(s) Oral every 6 hours PRN Moderate Pain (4 - 6)  sodium chloride 0.9% lock flush 10 milliLiter(s) IV Push every 1 hour PRN Pre/post blood products, medications, blood draw, and to maintain line patency      Allergies    atenolol (Unknown)  penicillin (Hives)  sulfa drugs (Hives)  shellfish (Hives)    Intolerances        Vital Signs Last 24 Hrs  T(C): 37.8 (15 Morgan 2025 08:00), Max: 39 (14 Jan 2025 20:00)  T(F): 100 (15 Morgan 2025 08:00), Max: 102.2 (14 Jan 2025 20:00)  HR: 135 (15 Morgan 2025 09:00) (71 - 152)  BP: 123/60 (15 Morgan 2025 09:00) (104/51 - 172/71)  BP(mean): 86 (15 Morgan 2025 09:00) (73 - 114)  RR: 30 (15 Morgan 2025 09:00) (20 - 58)  SpO2: 100% (15 Morgan 2025 09:00) (81% - 100%)    Parameters below as of 15 Morgan 2025 08:00  Patient On (Oxygen Delivery Method): BiPAP/CPAP    O2 Concentration (%): 50    I&O's Summary    14 Jan 2025 07:01  -  15 Morgan 2025 07:00  --------------------------------------------------------  IN: 3225 mL / OUT: 375 mL / NET: 2850 mL    15 Morgan 2025 07:01  -  15 Morgan 2025 10:13  --------------------------------------------------------  IN: 250 mL / OUT: 0 mL / NET: 250 mL        ON EXAM:    General: NAD, on BiPAP  HEENT: Mucous membranes are dry, anicteric  Lungs: Non-labored, breath sounds are clear bilaterally, No wheezing, rales or rhonchi  Cardiovascular: Regular, S1 and S2, no murmurs, rubs, or gallops  Gastrointestinal: Bowel Sounds present, soft, nontender.   Lymph: No peripheral edema. No lymphadenopathy.  Skin: No rashes or ulcers  Psych: cannot assess    LABS: All Labs Reviewed:                        7.9    74.93 )-----------( 14       ( 15 Morgan 2025 04:48 )             23.0                         8.3    74.58 )-----------( 16       ( 14 Jan 2025 22:33 )             24.0                         8.1    86.38 )-----------( 22       ( 14 Jan 2025 15:54 )             23.2     15 Morgan 2025 04:48    122    |  90     |  79     ----------------------------<  226    5.5     |  17     |  2.85   14 Jan 2025 22:33    123    |  89     |  75     ----------------------------<  229    5.1     |  15     |  2.68   14 Jan 2025 15:54    124    |  90     |  64     ----------------------------<  266    4.8     |  19     |  2.26     Ca    8.4        15 Jan 2025 04:48  Ca    8.8        14 Jan 2025 22:33  Ca    7.9        14 Jan 2025 15:54  Phos  6.0       15 Morgan 2025 04:48  Phos  5.3       14 Jan 2025 22:33  Phos  4.2       14 Jan 2025 15:54  Mg     2.1       15 Jan 2025 04:48  Mg     2.1       14 Jan 2025 22:33  Mg     1.9       14 Jan 2025 15:54    TPro  6.6    /  Alb  2.9    /  TBili  0.9    /  DBili  x      /  AST  92     /  ALT  31     /  AlkPhos  392    15 Morgan 2025 04:48  TPro  6.7    /  Alb  3.0    /  TBili  1.0    /  DBili  x      /  AST  94     /  ALT  30     /  AlkPhos  405    14 Jan 2025 22:33  TPro  6.2    /  Alb  3.0    /  TBili  1.0    /  DBili  x      /  AST  83     /  ALT  22     /  AlkPhos  377    14 Jan 2025 15:54    PT/INR - ( 14 Jan 2025 22:33 )   PT: 15.8 sec;   INR: 1.39 ratio         PTT - ( 14 Jan 2025 22:33 )  PTT:32.3 sec      Blood Culture: Organism --  Gram Stain Blood -- Gram Stain --  Specimen Source .Blood BLOOD  Culture-Blood --    Organism --  Gram Stain Blood -- Gram Stain --  Specimen Source .Blood BLOOD  Culture-Blood --    Organism --  Gram Stain Blood -- Gram Stain --  Specimen Source Clean Catch Clean Catch (Midstream)  Culture-Blood --    Organism --  Gram Stain Blood -- Gram Stain --  Specimen Source .Blood BLOOD  Culture-Blood --

## 2025-01-15 NOTE — CONSULT NOTE ADULT - PROBLEM SELECTOR RECOMMENDATION 9
- was receiving treatment while admitted however now comfort care  - symptom-directed care - heme/onc following, recs appreciated   - was receiving treatment while admitted however now comfort care  - symptom-directed care - heme/onc following, recs appreciated   - was receiving treatment while admitted however goals are now comfort care  - symptom-directed care

## 2025-01-15 NOTE — PROGRESS NOTE ADULT - ASSESSMENT
74F with pmhx left side breast cancer (s/p lumpectomy, RT and chemo), colon cancer (s/p resection), CAD (s/p CABG x2), DM2, gout, HTN, HLD who presented initially to [facility] with lower back pain and transferred to [facility] for hyperleukocytosis with reported blasts concerning for newly diagnosed acute myeloid leukemia. Patient presenting with .33 and blast 69% noted on manual differential. Cycle 1 dacogen started on 1/12, mylotarg given on 1/13 (not completed, only 10cc given). Now per GOC holding further chemo, transitioning to comfort focused care.

## 2025-01-15 NOTE — CONSULT NOTE ADULT - NS ATTEST RISK PROBLEM GEN_ALL_CORE FT
safety/toxicity monitoring of intravenous opiates and/or benzodiazepines in end stage disease process.

## 2025-01-15 NOTE — CHART NOTE - NSCHARTNOTEFT_GEN_A_CORE
MICU Transfer Note  ---------------------------    Transfer from: MICU  Transfer to:  ( X ) Medicine    (  ) Telemetry    (  ) RCU    (  ) Palliative    (  ) Stroke Unit    (  ) _______________  Accepting Physician:      MICHELLEU COURSE        OBJECTIVE --  Vital Signs Last 24 Hrs  T(C): 37.8 (15 Morgan 2025 08:00), Max: 39 (14 Jan 2025 20:00)  T(F): 100 (15 Morgan 2025 08:00), Max: 102.2 (14 Jan 2025 20:00)  HR: 152 (15 Morgna 2025 10:35) (71 - 152)  BP: 112/53 (15 Morgan 2025 10:30) (104/51 - 172/71)  BP(mean): 77 (15 Morgan 2025 10:30) (73 - 114)  RR: 24 (15 Morgan 2025 10:30) (20 - 58)  SpO2: 99% (15 Morgan 2025 10:35) (81% - 100%)    Parameters below as of 15 Morgan 2025 10:30  Patient On (Oxygen Delivery Method): nasal cannula  O2 Flow (L/min): 3      I&O's Summary    14 Jan 2025 07:01  -  15 Morgan 2025 07:00  --------------------------------------------------------  IN: 3225 mL / OUT: 375 mL / NET: 2850 mL    15 Morgan 2025 07:01  -  15 Morgan 2025 10:53  --------------------------------------------------------  IN: 250 mL / OUT: 0 mL / NET: 250 mL        MEDICATIONS  (STANDING):  chlorhexidine 2% Cloths 1 Application(s) Topical <User Schedule>  chlorhexidine 4% Liquid 1 Application(s) Topical <User Schedule>  labetalol Injectable 10 milliGRAM(s) IV Push every 6 hours  polyethylene glycol 3350 17 Gram(s) Oral daily  senna 2 Tablet(s) Oral at bedtime    MEDICATIONS  (PRN):  HYDROmorphone  Injectable 0.5 milliGRAM(s) IV Push every 4 hours PRN Moderate pain (4-6), Severe pain (7-10), Respiratory rate greater than 22  LORazepam   Injectable 0.5 milliGRAM(s) IV Push every 4 hours PRN Anxiety  ondansetron Injectable 4 milliGRAM(s) IV Push every 6 hours PRN Nausea and/or Vomiting  oxyCODONE    IR 5 milliGRAM(s) Oral every 6 hours PRN Moderate Pain (4 - 6)  sodium chloride 0.9% lock flush 10 milliLiter(s) IV Push every 1 hour PRN Pre/post blood products, medications, blood draw, and to maintain line patency        LABS                                            7.9                   Neurophils% (auto):   6.8    (01-15 @ 04:48):    74.93)-----------(14           Lymphocytes% (auto):  8.6                                           23.0                   Eosinphils% (auto):   0.0      Manual%: Neutrophils x    ; Lymphocytes x    ; Eosinophils x    ; Bands%: x    ; Blasts x                                    122    |  90     |  79                  Calcium: 8.4   / iCa: x      (01-15 @ 04:48)    ----------------------------<  226       Magnesium: 2.1                              5.5     |  17     |  2.85             Phosphorous: 6.0      TPro  6.6    /  Alb  2.9    /  TBili  0.9    /  DBili  x      /  AST  92     /  ALT  31     /  AlkPhos  392    15 Morgan 2025 04:48    ( 01-14 @ 22:33 )   PT: 15.8 sec;   INR: 1.39 ratio  aPTT: 32.3 sec          ASSESSMENT & PLAN:         For Follow-Up:  [ ]   [ ] MICU Transfer Note  ---------------------------    Transfer from: MICU  Transfer to:  ( X ) Medicine    (  ) Telemetry    (  ) RCU    (  ) Palliative    (  ) Stroke Unit    (  ) _______________  Accepting Physician: Bradley Goldberg      MICU COURSE  73 y/o F with PMHx breast cancer s/p lumpectomy, RT and chemo in 1999 w/ local recurrence in 2023 s/p resection; colon ca s/p resection in 2021, CAD s/p CABG x2 in 2010, HTN, HLD and T2DM who initially presented to [facility] for lethargy x 1 month w/ exertional SOB, acute on chronic low back pain and new b/l leg pain. She was found to have  w/ 69% blasts, transferred to [facility] for hematology evaluation of new acute leukemia and blast crisis.     Brief ED course:  VS: T 98.2, HR 86, /79, RR 16 spO2 94% on RA.  Labs: .33 1/ 69% blasts, Hgb 9.5, plt 43. K 2.6, trop 143.6, pro-BNP 1015  Imaging:CT lumbar spine showing L5-S1 diffuse disc bulging contributes to high-grade bilateral foraminal compromise without significant central canal compromise    Pt seen and evaluated by heme/onc in the ED, given allopurinol 300mg x1, rasburicase 3mg IV x1, hydroxyurea 200mg PO x1, 10 mEq K repletion. Pt was accepted directly to MICU for emergent leukapheresis.     Patient received leukopheresis x2 and was downgraded to the floors. They started induction chemotherapy to help reduced WBC burden, however had RRT for lethargy and CBC concerning for blast crisis; admitted back to MICU for urgent HD in the setting of possible TLS/DIC. Upon further GOC discussion with family and hematology, decision was made to prioritize comfort care for patient and to pursue hospice care.      OBJECTIVE --  Vital Signs Last 24 Hrs  T(C): 37.8 (15 Morgan 2025 08:00), Max: 39 (14 Jan 2025 20:00)  T(F): 100 (15 Morgan 2025 08:00), Max: 102.2 (14 Jan 2025 20:00)  HR: 152 (15 Morgan 2025 10:35) (71 - 152)  BP: 112/53 (15 Morgan 2025 10:30) (104/51 - 172/71)  BP(mean): 77 (15 Morgan 2025 10:30) (73 - 114)  RR: 24 (15 Morgan 2025 10:30) (20 - 58)  SpO2: 99% (15 Morgan 2025 10:35) (81% - 100%)    Parameters below as of 15 Morgan 2025 10:30  Patient On (Oxygen Delivery Method): nasal cannula  O2 Flow (L/min): 3      I&O's Summary    14 Jan 2025 07:01  -  15 Morgan 2025 07:00  --------------------------------------------------------  IN: 3225 mL / OUT: 375 mL / NET: 2850 mL    15 Morgan 2025 07:01  -  15 Morgan 2025 10:53  --------------------------------------------------------  IN: 250 mL / OUT: 0 mL / NET: 250 mL        MEDICATIONS  (STANDING):  chlorhexidine 2% Cloths 1 Application(s) Topical <User Schedule>  chlorhexidine 4% Liquid 1 Application(s) Topical <User Schedule>  labetalol Injectable 10 milliGRAM(s) IV Push every 6 hours  polyethylene glycol 3350 17 Gram(s) Oral daily  senna 2 Tablet(s) Oral at bedtime    MEDICATIONS  (PRN):  HYDROmorphone  Injectable 0.5 milliGRAM(s) IV Push every 4 hours PRN Moderate pain (4-6), Severe pain (7-10), Respiratory rate greater than 22  LORazepam   Injectable 0.5 milliGRAM(s) IV Push every 4 hours PRN Anxiety  ondansetron Injectable 4 milliGRAM(s) IV Push every 6 hours PRN Nausea and/or Vomiting  oxyCODONE    IR 5 milliGRAM(s) Oral every 6 hours PRN Moderate Pain (4 - 6)  sodium chloride 0.9% lock flush 10 milliLiter(s) IV Push every 1 hour PRN Pre/post blood products, medications, blood draw, and to maintain line patency        LABS                                            7.9                   Neurophils% (auto):   6.8    (01-15 @ 04:48):    74.93)-----------(14           Lymphocytes% (auto):  8.6                                           23.0                   Eosinphils% (auto):   0.0      Manual%: Neutrophils x    ; Lymphocytes x    ; Eosinophils x    ; Bands%: x    ; Blasts x                                    122    |  90     |  79                  Calcium: 8.4   / iCa: x      (01-15 @ 04:48)    ----------------------------<  226       Magnesium: 2.1                              5.5     |  17     |  2.85             Phosphorous: 6.0      TPro  6.6    /  Alb  2.9    /  TBili  0.9    /  DBili  x      /  AST  92     /  ALT  31     /  AlkPhos  392    15 Morgan 2025 04:48    ( 01-14 @ 22:33 )   PT: 15.8 sec;   INR: 1.39 ratio  aPTT: 32.3 sec          ASSESSMENT & PLAN:   73 y/o F with PMH breast cancer and colon cancer s/p resection and in remission, CAD s/p CABG x2, T2DM, gout, HTN, HLD directly admitted to MICU after presenting to [facility] w/ hyperleukocytosis w/ blast crises c/f new leukemia.    NEURO:   - currently at baseline A&O x4       PULM:  - Increased WOB; placed on BiPAP  - Suspect in the setting of Type B Lactic acidosis in the setting of AML  - Oxygenation is adequate; will wean as tolerated    CV:  #Elevated Troponin  - likely in setting of demand ischemia, pt w/o chest pain currently  - trop 57, EKG w/o changes  - trend trop to peak    #CAD s/p CABG (2010)   - home meds    GI:   - NPO for BiPAP  - no active issues     /RENAL:   #Hypokalemia  - replete for K>4, Phos >3, Mg >2    #Hyponatremia  - Trend BMP q6h  - Suspect likely SIADH vs hypovolemic hyponatremia;   - Patient NPO;   - Urine Studies limited given patient received fluids before studies collected  - Continue to monitor with q6h BMP  - Max Sodium correction today 132  - Stopping further lab draws    ID:   #Leukocytosis  - likely 2/2 to blast crisis  - will r/o infectious etiology w/ U/A, UCX, RVP, CXR, blood cx x2   - No Abx    HEME/ONC:   #Leukostasis  #Blast crisis   #Acute leukemia   #Anemia  #Thrombocytopenia   #Breast ca  #Colon ca    - s/p left breast lumpectomy in 1999  - s/p resection in 2001   - uric acid 9.7, ddimer 17,476 .33, blasts 69% on admission; peripheral smear w/ almost entirely blast-like cells w/o Bran rods  - s/p shiley placement for emergent leukapheresis   - s/p 3mg IV rasburicase x1 in ED    -Stopping further lab draws      #DVT Ppx:  - SCDs    #Ethics:  - Comfort care   - Dilaudid 0.5 q4h PRN  - Ativan 0.5 q4h PRN  - Tylenol PRN        For Follow-Up:  [ ]   [ ] MICU Transfer Note  ---------------------------    Transfer from: MICU  Transfer to:  ( X ) Medicine    (  ) Telemetry    (  ) RCU    (  ) Palliative    (  ) Stroke Unit    (  ) _______________  Accepting Physician: Bradley Goldberg      MICU COURSE  73 y/o F with PMHx breast cancer s/p lumpectomy, RT and chemo in 1999 w/ local recurrence in 2023 s/p resection; colon ca s/p resection in 2021, CAD s/p CABG x2 in 2010, HTN, HLD and T2DM who initially presented to [place] for lethargy x 1 month w/ exertional SOB, acute on chronic low back pain and new b/l leg pain. She was found to have  w/ 69% blasts, transferred to [facility] for hematology evaluation of new acute leukemia and blast crisis.     Brief ED course:  VS: T 98.2, HR 86, /79, RR 16 spO2 94% on RA.  Labs: .33 1/ 69% blasts, Hgb 9.5, plt 43. K 2.6, trop 143.6, pro-BNP 1015  Imaging:CT lumbar spine showing L5-S1 diffuse disc bulging contributes to high-grade bilateral foraminal compromise without significant central canal compromise    Pt seen and evaluated by heme/onc in the ED, given allopurinol 300mg x1, rasburicase 3mg IV x1, hydroxyurea 200mg PO x1, 10 mEq K repletion. Pt was accepted directly to MICU for emergent leukapheresis.     Patient received leukopheresis x2 and was downgraded to the floors. They started induction chemotherapy to help reduced WBC burden, however had RRT for lethargy and CBC concerning for blast crisis; admitted back to MICU for urgent HD in the setting of possible TLS/DIC. Upon further GOC discussion with family and hematology, decision was made to prioritize comfort care for patient and to pursue hospice care.      OBJECTIVE --  Vital Signs Last 24 Hrs  T(C): 37.8 (15 Morgan 2025 08:00), Max: 39 (14 Jan 2025 20:00)  T(F): 100 (15 Morgan 2025 08:00), Max: 102.2 (14 Jan 2025 20:00)  HR: 152 (15 Morgan 2025 10:35) (71 - 152)  BP: 112/53 (15 Morgan 2025 10:30) (104/51 - 172/71)  BP(mean): 77 (15 Morgan 2025 10:30) (73 - 114)  RR: 24 (15 Morgan 2025 10:30) (20 - 58)  SpO2: 99% (15 Morgan 2025 10:35) (81% - 100%)    Parameters below as of 15 Morgan 2025 10:30  Patient On (Oxygen Delivery Method): nasal cannula  O2 Flow (L/min): 3      I&O's Summary    14 Jan 2025 07:01  -  15 Morgan 2025 07:00  --------------------------------------------------------  IN: 3225 mL / OUT: 375 mL / NET: 2850 mL    15 Morgan 2025 07:01  -  15 Morgan 2025 10:53  --------------------------------------------------------  IN: 250 mL / OUT: 0 mL / NET: 250 mL        MEDICATIONS  (STANDING):  chlorhexidine 2% Cloths 1 Application(s) Topical <User Schedule>  chlorhexidine 4% Liquid 1 Application(s) Topical <User Schedule>  labetalol Injectable 10 milliGRAM(s) IV Push every 6 hours  polyethylene glycol 3350 17 Gram(s) Oral daily  senna 2 Tablet(s) Oral at bedtime    MEDICATIONS  (PRN):  HYDROmorphone  Injectable 0.5 milliGRAM(s) IV Push every 4 hours PRN Moderate pain (4-6), Severe pain (7-10), Respiratory rate greater than 22  LORazepam   Injectable 0.5 milliGRAM(s) IV Push every 4 hours PRN Anxiety  ondansetron Injectable 4 milliGRAM(s) IV Push every 6 hours PRN Nausea and/or Vomiting  oxyCODONE    IR 5 milliGRAM(s) Oral every 6 hours PRN Moderate Pain (4 - 6)  sodium chloride 0.9% lock flush 10 milliLiter(s) IV Push every 1 hour PRN Pre/post blood products, medications, blood draw, and to maintain line patency        LABS                                            7.9                   Neurophils% (auto):   6.8    (01-15 @ 04:48):    74.93)-----------(14           Lymphocytes% (auto):  8.6                                           23.0                   Eosinphils% (auto):   0.0      Manual%: Neutrophils x    ; Lymphocytes x    ; Eosinophils x    ; Bands%: x    ; Blasts x                                    122    |  90     |  79                  Calcium: 8.4   / iCa: x      (01-15 @ 04:48)    ----------------------------<  226       Magnesium: 2.1                              5.5     |  17     |  2.85             Phosphorous: 6.0      TPro  6.6    /  Alb  2.9    /  TBili  0.9    /  DBili  x      /  AST  92     /  ALT  31     /  AlkPhos  392    15 Morgan 2025 04:48    ( 01-14 @ 22:33 )   PT: 15.8 sec;   INR: 1.39 ratio  aPTT: 32.3 sec          ASSESSMENT & PLAN:   73 y/o F with PMH breast cancer and colon cancer s/p resection and in remission, CAD s/p CABG x2, T2DM, gout, HTN, HLD directly admitted to MICU after presenting to [facility] w/ hyperleukocytosis w/ blast crises c/f new leukemia.    NEURO:   - currently at baseline A&O x4       PULM:  - Increased WOB; placed on BiPAP  - Suspect in the setting of Type B Lactic acidosis in the setting of AML  - Oxygenation is adequate; will wean as tolerated    CV:  #Elevated Troponin  - likely in setting of demand ischemia, pt w/o chest pain currently  - trop 57, EKG w/o changes  - trend trop to peak    #CAD s/p CABG (2010)   - home meds    GI:   - NPO for BiPAP  - no active issues     /RENAL:   #Hypokalemia  - replete for K>4, Phos >3, Mg >2    #Hyponatremia  - Trend BMP q6h  - Suspect likely SIADH vs hypovolemic hyponatremia;   - Patient NPO;   - Urine Studies limited given patient received fluids before studies collected  - Continue to monitor with q6h BMP  - Max Sodium correction today 132  - Stopping further lab draws    ID:   #Leukocytosis  - likely 2/2 to blast crisis  - will r/o infectious etiology w/ U/A, UCX, RVP, CXR, blood cx x2   - No Abx    HEME/ONC:   #Leukostasis  #Blast crisis   #Acute leukemia   #Anemia  #Thrombocytopenia   #Breast ca  #Colon ca    - s/p left breast lumpectomy in 1999  - s/p resection in 2001   - uric acid 9.7, ddimer 17,476 .33, blasts 69% on admission; peripheral smear w/ almost entirely blast-like cells w/o Bran rods  - s/p shiley placement for emergent leukapheresis   - s/p 3mg IV rasburicase x1 in ED    -Stopping further lab draws      #DVT Ppx:  - SCDs    #Ethics:  - Comfort care   - Dilaudid 0.5 q4h PRN  - Ativan 0.5 q4h PRN  - Tylenol PRN        For Follow-Up:  [ ] Hospice Referral  [ ] Continue pain/comfort regiment

## 2025-01-15 NOTE — PROGRESS NOTE ADULT - NS ATTEND AMEND GEN_ALL_CORE FT
.  Primary: Goldberg    Vital Signs Last 24 Hrs  T(C): 38.6 (15 Morgan 2025 04:00), Max: 39 (14 Jan 2025 20:00)  T(F): 101.5 (15 Morgan 2025 04:00), Max: 102.2 (14 Jan 2025 20:00)  HR: 97 (15 Morgan 2025 04:00) (71 - 152)  BP: 146/63 (15 Morgan 2025 04:00) (103/44 - 199/81)  BP(mean): 91 (15 Morgan 2025 04:00) (64 - 116)  RR: 32 (15 Morgan 2025 04:00) (28 - 59)  SpO2: 93% (15 Morgan 2025 04:00) (81% - 100%)    Parameters below as of 14 Jan 2025 19:00  Patient On (Oxygen Delivery Method): BiPAP/CPAP    O2 Concentration (%): 50    MEDICATIONS  (STANDING):  allopurinol 100 milliGRAM(s) Oral daily  Biotene Dry Mouth Oral Rinse 15 milliLiter(s) Swish and Spit three times a day  caspofungin IVPB 50 milliGRAM(s) IV Intermittent every 24 hours  caspofungin IVPB      cefepime   IVPB 2000 milliGRAM(s) IV Intermittent every 12 hours  chlorhexidine 2% Cloths 1 Application(s) Topical <User Schedule>  chlorhexidine 4% Liquid 1 Application(s) Topical <User Schedule>  hydrochlorothiazide 25 milliGRAM(s) Oral daily  hydroxyurea 2000 milliGRAM(s) Oral three times a day  insulin lispro (ADMELOG) corrective regimen sliding scale   SubCutaneous every 6 hours  insulin NPH human recombinant 3 Unit(s) SubCutaneous every 6 hours  labetalol Injectable 10 milliGRAM(s) IV Push every 6 hours  lisinopril 40 milliGRAM(s) Oral daily  polyethylene glycol 3350 17 Gram(s) Oral daily  senna 2 Tablet(s) Oral at bedtime  valACYclovir 500 milliGRAM(s) Oral every 12 hours      Assessment: 74 year old post two days decitabine and received a partial dose of GO 1/13/25 for treatment related FLT-3 + AML.    Course complicated by fever (active). However, at this time, patient and family are pursuing palliative care.     Pmhx: left side breast cancer (s/p lumpectomy, RT and chemo), colon cancer (s/p resection), CAD (s/p CABG x2), DM2, gout, HTN, HLD     Heme:  Please D/C DORIAN  Recommend stopping allopurinol     ID: D/C antimicrobials    Radiographs (1/13/24): chest CT and head CT both negative     Palliative consult for inpatient hospice.    Please call for future questions.    Over 60 minutes were spent in direct patient care and care coordination.

## 2025-01-15 NOTE — CONSULT NOTE ADULT - ASSESSMENT
75 y/o F with PMH breast cancer and colon cancer s/p resection and in remission, CAD s/p CABG x2, T2DM, gout, HTN, HLD directly admitted to MICU after presenting to [facility] w/ hyperleukocytosis w/ blast crises c/f new leukemia. [facility] team consulted for PCU eval.     INCOMPLETE 73 y/o F with PMH breast cancer and colon cancer s/p resection and in remission, CAD s/p CABG x2, T2DM, gout, HTN, HLD directly admitted to MICU after presenting to [facility] w/ hyperleukocytosis w/ blast crises c/f new leukemia. GaP team consulted for PCU eval.

## 2025-01-15 NOTE — CONSULT NOTE ADULT - SUBJECTIVE AND OBJECTIVE BOX
HPI:  75 y/o F with PMHx breast cancer s/p lumpectomy, RT and chemo in 1999 w/ local recurrence in 2023 s/p resection; colon ca s/p resection in 2021, CAD s/p CABG x2 in 2010, HTN, HLD and T2DM who initially presented to [place] for lethargy x 1 month w/ exertional SOB, acute on chronic low back pain and new b/l leg pain. She was found to have  w/ 69% blasts, transferred to [facility] for hematology evaluation of new acute leukemia and blast crisis.     Brief ED course:  VS: T 98.2, HR 86, /79, RR 16 spO2 94% on RA.  Labs: .33 1/ 69% blasts, Hgb 9.5, plt 43. K 2.6, trop 143.6, pro-BNP 1015  Imaging:CT lumbar spine showing L5-S1 diffuse disc bulging contributes to high-grade bilateral foraminal compromise without significant central canal compromise    Pt seen and evaluated by heme/onc in the ED, given allopurinol 300mg x1, rasburicase 3mg IV x1, hydroxyurea 200mg PO x1, 10 mEq K repletion. Pt was accepted directly to MICU for emergent leukapheresis.  (07 Jan 2025 21:38)    GaP team consulted for PCU eval.     Pt unarousable with increased WOB on bedside assessment.  and sister at bedside.     Attempted to call primary HCP Aleksandra but no answer. Called secondary HCP Sravan and discussed goal of comfort which he confirmed. Also discussed team will make symptom management recommendations and discussed transfer to PCU as short-stay unit when bed available. Sravan in agreement.     PERTINENT PM/SXH:   Colon Cancer    Breast Ca    HTN (Hypertension)    Hyperlipidemia    Coronary artery disease    Diabetes mellitus    Vertigo    Renal insufficiency      S/P Breast Lumpectomy    History of Colon Resection    History of Colon Resection    S/P Breast Lumpectomy    S/P CABG x 2    H/O forearm fracture      FAMILY HISTORY:  FH: breast cancer  mother      Family Hx substance abuse [ ]yes [ ]no  ITEMS NOT CHECKED ARE NOT PRESENT    SOCIAL HISTORY:   Significant other/partner[ x] Krishna alissa  Children[ x] Sravan maxwell  Yazidism/Spirituality:  Substance hx:  [ ]   Tobacco hx:  [ ]   Alcohol hx: [ ]   Home Opioid hx:  [ ] I-Stop Reference No:  Living Situation: [x ]Home  [ ]Long term care  [ ]Rehab [ ]Other    ADVANCE DIRECTIVES:    DNR/MOLST  [ x]  Living Will  [ ]   DECISION MAKER(s):  [x ] Health Care Proxy(s)  [ ] Surrogate(s)  [ ] Guardian           Name(s): Phone Number(s): Primary Aleksandra Cristobal [phone], Secondary Sravan Maxwell [phone]    BASELINE (I)ADL(s) (prior to admission):  Napoleon: [x ]Total  [ ] Moderate [ ]Dependent    Allergies    atenolol (Unknown)  penicillin (Hives)  sulfa drugs (Hives)  shellfish (Hives)    Intolerances    MEDICATIONS  (STANDING):  chlorhexidine 2% Cloths 1 Application(s) Topical <User Schedule>  chlorhexidine 4% Liquid 1 Application(s) Topical <User Schedule>  labetalol Injectable 10 milliGRAM(s) IV Push every 6 hours  polyethylene glycol 3350 17 Gram(s) Oral daily  senna 2 Tablet(s) Oral at bedtime    MEDICATIONS  (PRN):  HYDROmorphone  Injectable 0.5 milliGRAM(s) IV Push every 4 hours PRN Moderate pain (4-6), Severe pain (7-10), Respiratory rate greater than 22  LORazepam   Injectable 0.5 milliGRAM(s) IV Push every 4 hours PRN Anxiety  ondansetron Injectable 4 milliGRAM(s) IV Push every 6 hours PRN Nausea and/or Vomiting  oxyCODONE    IR 5 milliGRAM(s) Oral every 6 hours PRN Moderate Pain (4 - 6)  sodium chloride 0.9% lock flush 10 milliLiter(s) IV Push every 1 hour PRN Pre/post blood products, medications, blood draw, and to maintain line patency    PRESENT SYMPTOMS: [x ]Unable to self-report  [ ] CPOT [ ] PAINADs [ ] RDOS  Source if other than patient:  [ ]Family   [ ]Team     Pain: [ ]yes [ ]no  QOL impact -   Location -                    Aggravating factors -  Quality -  Radiation -  Timing-  Severity (0-10 scale):  Minimal acceptable level (0-10 scale):     CPOT:    https://www.[email or web address]/getattachment/[email or web address]/Critical-Care-Pain-Observation-Tool-(CPOT)    PAIN AD Score:   http://geriatrictoolkit.[email or web address]/cog/painad.pdf (press ctrl +  left click to view)    Dyspnea:                           [ ]Mild [ x]Moderate [ ]Severe      RDOS:  0 to 2  minimal or no respiratory distress   3  mild distress  4 to 6 moderate distress  >7 severe distress  https://homecareinformation.net/handouts/hen/Respiratory_Distress_Observation_Scale.pdf (Ctrl +  left click to view)     Anxiety:                             [ ]Mild [ ]Moderate [ ]Severe  Fatigue:                             [ ]Mild [ ]Moderate [ ]Severe  Nausea:                             [ ]Mild [ ]Moderate [ ]Severe  Loss of appetite:              [ ]Mild [ ]Moderate [ ]Severe  Constipation:                    [ ]Mild [ ]Moderate [ ]Severe    PCSSQ[Palliative Care Spiritual Screening Question]   Severity (0-10):  Score of 4 or > indicate consideration of Chaplaincy referral.  Chaplaincy Referral: [ x] yes [ ] refused [ ] following [ ] Deferred     Caregiver [place]? : [ ] yes [ ] no [ x] Deferred [ ] Declined             Social work referral [ ] Patient & Family Centered Care Referral [ ]     Anticipatory Grief present?:  [ ] yes [ ] no  [x ] Deferred                  Social work referral [ ] Chaplaincy Referral[ ]      Other Symptoms:  [ ]All other review of systems negative - unable to assess 2/2 poor mentation    Palliative Performance Status Version 2:     10    %    http://[email or web address]/files/news/palliative_performance_scale_ppsv2.pdf  PHYSICAL EXAM:  Vital Signs Last 24 Hrs  T(C): 37.8 (15 Morgan 2025 08:00), Max: 39 (14 Jan 2025 20:00)  T(F): 100 (15 Morgan 2025 08:00), Max: 102.2 (14 Jan 2025 20:00)  HR: 149 (15 Morgan 2025 12:01) (71 - 152)  BP: 117/59 (15 Morgan 2025 11:00) (104/51 - 172/71)  BP(mean): 79 (15 Morgan 2025 11:00) (73 - 103)  RR: 27 (15 Morgan 2025 11:00) (20 - 58)  SpO2: 100% (15 Morgan 2025 12:01) (89% - 100%)    Parameters below as of 15 Morgan 2025 10:30  Patient On (Oxygen Delivery Method): nasal cannula  O2 Flow (L/min): 3   I&O's Summary    14 Jan 2025 07:01  -  15 Morgan 2025 07:00  --------------------------------------------------------  IN: 3225 mL / OUT: 375 mL / NET: 2850 mL    15 Morgan 2025 07:01  -  15 Morgan 2025 13:54  --------------------------------------------------------  IN: 250 mL / OUT: 325 mL / NET: -75 mL      GENERAL: [ ]Cachexia    [ ]Alert  [ ]Oriented x   [ ]Lethargic  [x ]Unarousable  [ ]Verbal  [ ]Non-Verbal  Behavioral:   [ ] Anxiety  [ ] Delirium [ ] Agitation [ ] Other  HEENT:  [ ]Normal   [ x]Dry mouth   [ ]ET Tube/Trach  [ ]Oral lesions  PULMONARY:   [x ]Clear [x ]Tachypnea  [ ]Audible excessive secretions   [ ]Rhonchi        [ ]Right [ ]Left [ ]Bilateral  [ ]Crackles        [ ]Right [ ]Left [ ]Bilateral  [ ]Wheezing     [ ]Right [ ]Left [ ]Bilateral  [ ]Diminished breath sounds [ ]right [ ]left [ ]bilateral  CARDIOVASCULAR:    [x ]Regular [ ]Irregular [ ]Tachy  [ ]Nirav [ ]Murmur [ ]Other  GASTROINTESTINAL:  [ x]Soft  [ x]Distended   [x ]+BS  [ ]Non tender [ ]Tender  [ ]Other [ ]PEG [ ]OGT/ NGT  Last BM:  GENITOURINARY:  [x ]Normal [ ] Incontinent   [ ]Oliguria/Anuria   [x ]Castillo  MUSCULOSKELETAL:   [ ]Normal   [ ]Weakness  [ x]Bed/Wheelchair bound [ ]Edema  NEUROLOGIC:   [ ]No focal deficits  [ ]Cognitive impairment  [ ]Dysphagia [ ]Dysarthria [ ]Paresis [ ]Other   SKIN:   [ ]Normal  [ ]Rash  [ ]Other  [ ]Pressure ulcer(s)       Present on admission [ ]y [ ]n    CRITICAL CARE:  [ ] Shock Present  [ ]Septic [ ]Cardiogenic [ ]Neurologic [ ]Hypovolemic  [ ]  Vasopressors [ ]  Inotropes   [ ]Respiratory failure present [ ]Mechanical ventilation [ ]Non-invasive ventilatory support [ ]High flow    [ ]Acute  [ ]Chronic [ ]Hypoxic  [ ]Hypercarbic [ ]Other  [x ]Other organ failure kidneys    LABS:                        7.9    74.93 )-----------( 14       ( 15 Morgan 2025 04:48 )             23.0   01-15    122[L]  |  90[L]  |  79[H]  ----------------------------<  226[H]  5.5[H]   |  17[L]  |  2.85[H]    Ca    8.4      15 Morgan 2025 04:48  Phos  6.0     01-15  Mg     2.1     01-15    TPro  6.6  /  Alb  2.9[L]  /  TBili  0.9  /  DBili  x   /  AST  92[H]  /  ALT  31  /  AlkPhos  392[H]  01-15  PT/INR - ( 14 Jan 2025 22:33 )   PT: 15.8 sec;   INR: 1.39 ratio         PTT - ( 14 Jan 2025 22:33 )  PTT:32.3 sec    Urinalysis Basic - ( 15 Morgan 2025 04:48 )    Color: x / Appearance: x / SG: x / pH: x  Gluc: 226 mg/dL / Ketone: x  / Bili: x / Urobili: x   Blood: x / Protein: x / Nitrite: x   Leuk Esterase: x / RBC: x / WBC x   Sq Epi: x / Non Sq Epi: x / Bacteria: x      RADIOLOGY & ADDITIONAL STUDIES:  CT Chest  FINDINGS/  IMPRESSION:    No acute process in the chest. Scattered bilateral segmental atelectasis.   No pneumonia.    Cardiomegaly. Coronary and cardiac calcifications. Limited visualized   upper abdomen is unremarkable. Mediastinal sternotomy wires. Left breast   coarse calcifications with adjacent skin thickening on image 68 of series   3. Left breast soft tissue lesion measuring 2.8 x 2.8 cm. Correlate with   mammogram and physical examination. Visualized thyroid gland is   unremarkable. Right-sided PICC line catheter tip in the distal SVC. No   acute osseous abnormality.    PROTEIN CALORIE MALNUTRITION PRESENT: [ ]mild [ ]moderate [ ]severe [ ]underweight [ ]morbid obesity  https://www.andeal.org/vault/5990/web/files/ONC/Table_Clinical%20Characteristics%20to%20Document%20Malnutrition-White%20JV%20et%20al%202012.pdf    Height (cm): 165.1 (01-14-25 @ 01:14), 165.1 (01-07-25 @ 11:51)  Weight (kg): 92.2 (01-14-25 @ 01:14), 93 (01-07-25 @ 11:51)  BMI (kg/m2): 33.8 (01-14-25 @ 01:14), 34.1 (01-07-25 @ 11:51)    [x ]PPSV2 < or = to 30% [ ]significant weight loss  [ ]poor nutritional intake  [ ]anasarca[ ]Artificial Nutrition      Other REFERRALS:  [ ]Hospice  [ ]Child Life  [ ]Social Work  [ ]Case management [ ]Holistic Therapy     Goals of Care Document:

## 2025-01-15 NOTE — PROGRESS NOTE ADULT - NS ATTEND OPT1 GEN_ALL_CORE

## 2025-01-15 NOTE — CONSULT NOTE ADULT - PROBLEM SELECTOR RECOMMENDATION 2
- transitioned IV dilaudid to 0.5mg q1h PRN dyspnea  - start robinul 0.4mg q6h PRN copious secretions - transition IV dilaudid to 0.5mg q1h PRN dyspnea  - start robinul 0.4mg q6h PRN copious secretions

## 2025-01-15 NOTE — CONSULT NOTE ADULT - ATTENDING COMMENTS
Discussed case in detail with the fellow. This lady with acute leukemia is at significant risk for leukostasis given blast count >100,000. Recommend leuokopharesis at this point in the MICU.
75 y/o F with PMH breast cancer and colon cancer s/p resection and in remission, CAD s/p CABG x2, T2DM, gout, HTN, HLD directly admitted to MICU after presenting to [facility] w/ hyperleukocytosis w/ blast crises c/f new leukemia. GaP team consulted for PCU eval.     See above GOC note. Goals confirmed for comfort directed care. MOLST completed by MICU team for DNR/DNI. Agree with symptom management as noted above. Pt pending transfer to PCU for symptom management when bed available. Further disposition will be guided by clinical course.

## 2025-01-15 NOTE — PROGRESS NOTE ADULT - PROBLEM SELECTOR PLAN 1
1/8/25 TM interpretation: Abnormal myeloblasts (93%), consistent w/ acute myeloid leukemia. FISH for PML-SOULEYMANE is negative. The overall findings are consistent with AML.    Monitor CBC w dif, CMP, TLS labs - replete electrolytes and transfuse blood products PRN  Consider rasburicase 3mg IV x 1 if uric acid >8, and 6mg IV x 1 if uric acid >12  IVF, daily weights, strict Is/Os, diuresis PRN  Mouth care, Antiemetics Continue allopurinol 300mg QD  S/p leukophoresis x2 on 1/8 and 1/9 1/8 BM bx: diffuse involvement by AML  1/10 Hydrea increased to 2000mg TID, Research RN meeting w/ pt to discuss study.  1/11 PICC ordered. WBC increasing, monitor on hydrea TID  1/12 Dacogen 20mg/m2 started- Venetoclax to start once WBC decrease.   1/13 Mylotarg started BUT NOT COMPLETED (only 10cc infused).    1/14-1/15 Family declines wanting any additional treatment. Treatment orders cancelled. GOC below. Recommend hospice eval. [facility] leukemia service to sign off.

## 2025-01-15 NOTE — CHART NOTE - NSCHARTNOTEFT_GEN_A_CORE
MAR Accept Note  Transfer to:    Accepting Attending Physician:    Assigned Room:      Patient seen and examined.   Labs and data reviewed.   No findings precluding transfer of service.       HPI/MICU COURSE:   Please refer to MICU transfer note for full details. Briefly, this is a 75 y/o F with PMHx breast cancer s/p lumpectomy, RT and chemo in 1999 w/ local recurrence in 2023 s/p resection; colon ca s/p resection in 2021, CAD s/p CABG x2 in 2010, HTN, HLD and T2DM who initially presented to [place] for lethargy x 1 month w/ exertional SOB, acute on chronic low back pain and new b/l leg pain. She was found to have  w/ 69% blasts, transferred to [facility] for hematology evaluation of new acute leukemia and blast crisis.     Brief ED course:  VS: T 98.2, HR 86, /79, RR 16 spO2 94% on RA.  Labs: .33 1/ 69% blasts, Hgb 9.5, plt 43. K 2.6, trop 143.6, pro-BNP 1015  Imaging:CT lumbar spine showing L5-S1 diffuse disc bulging contributes to high-grade bilateral foraminal compromise without significant central canal compromise    Pt seen and evaluated by heme/onc in the ED, given allopurinol 300mg x1, rasburicase 3mg IV x1, hydroxyurea 200mg PO x1, 10 mEq K repletion. Pt was accepted directly to MICU for emergent leukapheresis.     Patient received leukopheresis x2 and was downgraded to the floors. They started induction chemotherapy to help reduced WBC burden, however had RRT for lethargy and CBC concerning for blast crisis; admitted back to MICU for urgent HD in the setting of possible TLS/DIC. Upon further GOC discussion with family and hematology, decision was made to prioritize comfort care for patient and to pursue hospice care.      FOR FOLLOW-UP:  [ ] Hospice Referral  [ ] Continue pain/comfort regiment.    Nichelle Mancilla MD PGY-3  Internal Medicine Resident  [facility] MAR [id number]

## 2025-01-15 NOTE — PROVIDER CONTACT NOTE (OTHER) - DATE AND TIME:
13-Jan-2025 17:05
13-Jan-2025 18:20
09-Jan-2025 22:23
15-Morgan-2025 21:09
12-Jan-2025 16:10
13-Jan-2025 16:15

## 2025-01-15 NOTE — DISCHARGE NOTE FOR THE EXPIRED PATIENT - HOSPITAL COURSE
73 y/o F with PMH breast cancer and colon cancer s/p resection and in remission, CAD s/p CABG x2, T2DM, gout, HTN, HLD presenting to [facility] w/ hyperleukocytosis w/ blast crises c/f new leukemia. During hospitalization patient was followed by oncology with hopes that treatment would help, however continued to have deterioration, including respiratory distress. Patient was transferred to MICU for further care. Family decided to make patient comfort care and was transferred PCU where she  on 1/15/25.

## 2025-01-15 NOTE — CONSULT NOTE ADULT - CONVERSATION DETAILS
Attempted to call primary HCP Aleksandra but no answer. Called secondary HCP Sravan and discussed goal of comfort which he confirmed. Also discussed team will make symptom management recommendations and discussed transfer to PCU as short-stay unit when bed available. Sravan in agreement.

## 2025-01-15 NOTE — PROGRESS NOTE ADULT - PROVIDER SPECIALTY LIST ADULT
Internal Medicine - 15 mmol/L    Glucose 97 65 - 100 mg/dL    BUN 15 6 - 20 mg/dL    Creatinine 1.07 0.70 - 1.30 mg/dL    BUN/Creatinine Ratio 14 12 - 20      Est, Glom Filt Rate >90 >60 ml/min/1.73m2    Calcium 9.1 8.5 - 10.1 mg/dL    Total Bilirubin 0.2 0.2 - 1.0 mg/dL    AST 12 (L) 15 - 37 U/L    ALT 14 12 - 78 U/L    Alk Phosphatase 79 60 - 330 U/L    Total Protein 8.3 (H) 6.4 - 8.2 g/dL    Albumin 3.7 3.5 - 5.0 g/dL    Globulin 4.6 (H) 2.0 - 4.0 g/dL    Albumin/Globulin Ratio 0.8 (L) 1.1 - 2.2         Results in Past 30 Days  Result Component Current Result Ref Range Previous Result Ref Range   Appearance Clear (6/16/2024) Clear   Not in Time Range    Bilirubin, Urine Negative (6/16/2024) Negative   Not in Time Range    Blood, Urine Negative (6/16/2024) Negative   Not in Time Range    Color, UA Yellow/Straw (6/16/2024)   Not in Time Range    Glucose, Ur Negative (6/16/2024) Negative mg/dL Not in Time Range    Ketones, Urine 5 (A) (6/16/2024) Negative mg/dL Not in Time Range    Leukocyte Esterase, Urine Trace (A) (6/16/2024) Negative   Not in Time Range    Nitrite, Urine Negative (6/16/2024) Negative   Not in Time Range    Protein, UA 30 (A) (6/16/2024) Negative mg/dL Not in Time Range    RBC, UA 0-5 (6/16/2024) 0 - 5 /hpf Not in Time Range    Urobilinogen, Urine 2.0 (H) (6/16/2024) 0.1 - 1.0 EU/dL Not in Time Range    WBC, UA 10-20 (6/16/2024) 0 - 4 /hpf Not in Time Range          Physical Exam:  Physical Exam  Vitals and nursing note reviewed.   Constitutional:       General: He is not in acute distress.     Appearance: He is normal weight. He is not ill-appearing, toxic-appearing or diaphoretic.   HENT:      Head: Normocephalic and atraumatic.      Nose: Nose normal.      Mouth/Throat:      Mouth: Mucous membranes are moist.      Pharynx: Oropharynx is clear.   Eyes:      General: No scleral icterus.     Extraocular Movements: Extraocular movements intact.      Conjunctiva/sclera: Conjunctivae normal.      Pupils: Pupils are  MICU

## 2025-01-15 NOTE — PROGRESS NOTE ADULT - CONVERSATION DETAILS
Discussed with patient's HCP who denied further chemotherapy. Patient and HCP/Family wish to pursue hospice care.

## 2025-01-15 NOTE — PROGRESS NOTE ADULT - SUBJECTIVE AND OBJECTIVE BOX
Diagnosis    Protocol/Chemo Regimen: None  Day: N/A     Pt endorsed: on Bipap mask. Care discussed with family at bedside who confirmed wishes to hold off any further chemo and transition to comfort focused care.    Allergies    atenolol (Unknown)  penicillin (Hives)  sulfa drugs (Hives)  shellfish (Hives)    Intolerances        ANTIMICROBIALS      HEME/ONC MEDICATIONS      STANDING MEDICATIONS  chlorhexidine 2% Cloths 1 Application(s) Topical <User Schedule>  chlorhexidine 4% Liquid 1 Application(s) Topical <User Schedule>  labetalol Injectable 10 milliGRAM(s) IV Push every 6 hours  polyethylene glycol 3350 17 Gram(s) Oral daily  senna 2 Tablet(s) Oral at bedtime      PRN MEDICATIONS  HYDROmorphone  Injectable 0.5 milliGRAM(s) IV Push every 4 hours PRN  LORazepam   Injectable 0.5 milliGRAM(s) IV Push every 4 hours PRN  ondansetron Injectable 4 milliGRAM(s) IV Push every 6 hours PRN  oxyCODONE    IR 5 milliGRAM(s) Oral every 6 hours PRN  sodium chloride 0.9% lock flush 10 milliLiter(s) IV Push every 1 hour PRN        Vital Signs Last 24 Hrs  T(C): 37.8 (15 Morgan 2025 08:00), Max: 39 (14 Jan 2025 20:00)  T(F): 100 (15 Morgan 2025 08:00), Max: 102.2 (14 Jan 2025 20:00)  HR: 152 (15 Morgan 2025 10:35) (71 - 152)  BP: 112/53 (15 Morgan 2025 10:30) (104/51 - 172/71)  BP(mean): 77 (15 Morgan 2025 10:30) (73 - 114)  RR: 24 (15 Morgan 2025 10:30) (20 - 58)  SpO2: 99% (15 Morgan 2025 10:35) (81% - 100%)    Parameters below as of 15 Morgan 2025 10:30  Patient On (Oxygen Delivery Method): nasal cannula  O2 Flow (L/min): 3      PHYSICAL EXAM  General: adult in NAD  HEENT: clear oropharynx, anicteric sclera, pink conjunctiva  Neck: supple  CV: normal S1/S2 RRR  Lungs: positive air movement b/l ant lungs,clear to auscultation, no wheezes, no rales  Abdomen: soft non-tender non-distended, (+) BS  Ext: no edema  Skin: no rashes and no petechiae  Neuro: alert and oriented X 3, no focal deficits  Central Line: normal    LABS:    Blood Cultures:                           7.9    74.93 )-----------( 14       ( 15 Morgan 2025 04:48 )             23.0         Mean Cell Volume : 87.1 fl  Mean Cell Hemoglobin : 29.9 pg  Mean Cell Hemoglobin Concentration : 34.3 g/dL  Auto Neutrophil # : 5.12 K/uL  Auto Lymphocyte # : 6.43 K/uL  Auto Monocyte # : 63.36 K/uL  Auto Eosinophil # : 0.01 K/uL  Auto Basophil # : 0.01 K/uL  Auto Neutrophil % : 6.8 %  Auto Lymphocyte % : 8.6 %  Auto Monocyte % : 84.6 %  Auto Eosinophil % : 0.0 %  Auto Basophil % : 0.0 %      01-15    122[L]  |  90[L]  |  79[H]  ----------------------------<  226[H]  5.5[H]   |  17[L]  |  2.85[H]    Ca    8.4      15 Morgan 2025 04:48  Phos  6.0     01-15  Mg     2.1     01-15    TPro  6.6  /  Alb  2.9[L]  /  TBili  0.9  /  DBili  x   /  AST  92[H]  /  ALT  31  /  AlkPhos  392[H]  01-15      Mg 2.1  Phos 6.0  Mg 2.1  Phos 5.3  Mg 1.9  Phos 4.2  Mg 1.4  Phos 2.9      PT/INR - ( 14 Jan 2025 22:33 )   PT: 15.8 sec;   INR: 1.39 ratio         PTT - ( 14 Jan 2025 22:33 )  PTT:32.3 sec    LDH 5167  Uric Acid 4.0    LDH 5935  Uric Acid 3.8    LDH 4550  Uric Acid 2.2        RADIOLOGY & ADDITIONAL STUDIES:

## 2025-01-15 NOTE — PROGRESS NOTE ADULT - ASSESSMENT
73 y/o F with PMH breast cancer and colon cancer s/p resection and in remission, CAD s/p CABG x2, T2DM, gout, HTN, HLD directly admitted to MICU after presenting to [facility] w/ hyperleukocytosis w/ blast crises c/f new leukemia.    NEURO:   - currently at baseline A&O x4     PULM:  - Increased WOB; placed on BiPAP  - Suspect in the setting of Type B Lactic acidosis in the setting of AML  - Oxygenation is adequate; will wean as tolerated    CV:  #Elevated Troponin  - likely in setting of demand ischemia, pt w/o chest pain currently  - trop 57, EKG w/o changes  - trend trop to peak    #CAD s/p CABG (2010)   - home meds    GI:   - NPO for BiPAP  - no active issues     /RENAL:   #Hypokalemia  - replete for K>4, Phos >3, Mg >2    #Hyponatremia  - Trend BMP q6h  - Suspect likely SIADH vs hypovolemic hyponatremia;   - Patient NPO;   - Urine Studies limited given patient received fluids before studies collected  - Continue to monitor with q6h BMP  - Max Sodium correction today 132    ID:   #Leukocytosis  - likely 2/2 to blast crisis  - will r/o infectious etiology w/ U/A, UCX, RVP, CXR, blood cx x2   - hold off on abx at this time  - trend fever curve     HEME/ONC:   #Leukostasis  #Blast crisis   #Acute leukemia   #Anemia  #Thrombocytopenia   #Breast ca  #Colon ca    - s/p left breast lumpectomy in 1999  - s/p resection in 2001   - uric acid 9.7, ddimer 17,476 .33, blasts 69% on admission; peripheral smear w/ almost entirely blast-like cells w/o Bran rods  - s/p shiley placement for emergent leukapheresis   - s/p 3mg IV rasburicase x1 in ED       - per heme if uric acid >8, give rasburicase x1; if >12 give 6mg IV x1  - c/w allopurinol 300mg qd for TLS ppx (uptitrated from home dose of 100mg QD)  - c/w hydroxyurea 2000 mg BID for cytoreduction  - per heme recs: trend cbc w/ diff, coags, fibrinogen clauss, d-dimer and TLS labs (uric acid, LDH, mg, phos) daily  - Pending further GOC with family   - transfuse for Hgb >7, Plt<10k, <20 w/ fever< 50k w/ bleed     #DVT Ppx:  - SCDs    #Ethics:  - Will need further discussion with family regarding Trialing Chemo vs full comfort measures, pending a discussion with Heme 73 y/o F with PMH breast cancer and colon cancer s/p resection and in remission, CAD s/p CABG x2, T2DM, gout, HTN, HLD directly admitted to MICU after presenting to [facility] w/ hyperleukocytosis w/ blast crises c/f new leukemia.    NEURO:   - currently at baseline A&O x4       PULM:  - Increased WOB; placed on BiPAP  - Suspect in the setting of Type B Lactic acidosis in the setting of AML  - Oxygenation is adequate; will wean as tolerated    CV:  #Elevated Troponin  - likely in setting of demand ischemia, pt w/o chest pain currently  - trop 57, EKG w/o changes  - trend trop to peak    #CAD s/p CABG (2010)   - home meds    GI:   - NPO for BiPAP  - no active issues     /RENAL:   #Hypokalemia  - replete for K>4, Phos >3, Mg >2    #Hyponatremia  - Trend BMP q6h  - Suspect likely SIADH vs hypovolemic hyponatremia;   - Patient NPO;   - Urine Studies limited given patient received fluids before studies collected  - Continue to monitor with q6h BMP  - Max Sodium correction today 132  - Stopping further lab draws    ID:   #Leukocytosis  - likely 2/2 to blast crisis  - will r/o infectious etiology w/ U/A, UCX, RVP, CXR, blood cx x2   - No Abx    HEME/ONC:   #Leukostasis  #Blast crisis   #Acute leukemia   #Anemia  #Thrombocytopenia   #Breast ca  #Colon ca    - s/p left breast lumpectomy in 1999  - s/p resection in 2001   - uric acid 9.7, ddimer 17,476 .33, blasts 69% on admission; peripheral smear w/ almost entirely blast-like cells w/o Bran rods  - s/p shiley placement for emergent leukapheresis   - s/p 3mg IV rasburicase x1 in ED    -Stopping further lab draws      #DVT Ppx:  - SCDs    #Ethics:  - Comfort care   - Dilaudid 0.5 q4h PRN  - Ativan 0.5 q4h PRN  - Tylenol PRN  - Palliative Consulted

## 2025-01-15 NOTE — PROGRESS NOTE ADULT - ASSESSMENT
Ellie is a 73 y/o F PMH breast cancer in remission('99, '23), colon cancer '01 s/p resection in remission, HTN, HLD, DM2, CAD status post CABG @ [facility] &, vertigo.    Presents to [facility] ED 1/7 c/o generalized back pain x 3 months with some abdominal discomfort over the past 1 week. She has been feeling some profound weakness and fatigue and has been unable to carry on ADLs.  Over the past 3 days or so she has had some dyspnea on exertion. Patient is having some left upper leg discomfort.      - found to have significantly elevated WBC at >280k, s/p leukapheresis   - mild troponin elevation, though no suggestion of acs (Trop I 143--> Trop T 57)  - ekg with sr, rbbb, nsst abn  - TTE with normal BiV function, no WMA noted.   - ASA on hold given significant anemia, thrombocytopenia     - clinically worsening, with blast crisis, tls  - without cp but has been hypoxic/short of breath/lethargic, now on bipap  - af with rvr overnight, got iv bb and amiodarone  - continues to be in af at 120's  - can give extra amiodarone 150 iv if palpitations, or lopressor 2.5 iv q6-8hr, if congruent with goals of care  - family/patient interested in palliative care/hospice    - will follow as needed.

## 2025-01-15 NOTE — PROGRESS NOTE ADULT - ATTENDING COMMENTS
Saw patient in the MICU. She has new diagnosis of likely acute leukemia with a monocytic phenoytpe. Flow cytometry is still pending, but suspect AML based on elevation of monocytes. Would recommend another leukapheresis tonight given continual rising WBC count with goal blast count <100,000. Continue with hydroxyurea.
1. Acute metabolic acidosis from B lactic acidosis from AML. Increase work of breathing from metabolic acidosis. No evidence of pneumonia or chf on POCUS. Give Dilaudid for dyspnea. Then remove BiPAP mask.     2. AML with wbc>130K now. Pt s/p leukophoresis. Heme recommends chemotherapy, however family and patient are declining further therapy.  Pt received 1 unit PRBC  with appropriate response.    3. LR at 100cc/hr  Stop.    4 ID: Continue cefepime.     5. GOC: Family declining further therapy. Comfort care. DNR/DNI
1. Acute metabolic acidosis from B lactic acidosis from AML. Increase work of breathing from metabolic acidosis. No evidence of pneumonia or chf on POCUS. Continue BiPAP for work of breathing.    2. AML with wbc>130K now. Pt s/p leukophoresis. Heme recommends chemotherapy, however family and patient are declining further therapy.  Pt received 1 unit PRBC  with appropriate response.    3. LR at 100cc/hr added. Pt volume depleted . Virtual IVC on POCUS.    4 ID: Continue cefepime. Add caspofungin as per heme.     5. GOC: Family declining further therapy. Comfort care. Palliative consult.

## 2025-01-15 NOTE — CHART NOTE - NSCHARTNOTESELECT_GEN_ALL_CORE
Event Note
MAR Accept Note/Event Note
MICU Accept Note
POCUS/Event Note
Transfer Note
Apheresis/Blood Bank
Event Note
MICU Downgrade/Transfer Note

## 2025-01-15 NOTE — PROVIDER CONTACT NOTE (OTHER) - REASON
Pronouncement of death
Patient has AMS, increased respiratory rate and labor of breathing.
Pt 185/68, , T 99.7F, 96% on 2L NC, RR 20, due for mylotarg
Patient hypertensive 187/85 , , RR 20, 96% 2L NC pt has labored breathing.
Pt spiked fever 100.8 tympanic, Pt EZ=740/87 R leg, 202/81 R lower arm
pt temp 101

## 2025-01-15 NOTE — PROVIDER CONTACT NOTE (OTHER) - SITUATION
Patient hypertensive 187/85 , , RR 20, 96% 2L NC pt has labored breathing.
Pt spiked fever 100.8 tympanic, Pt JT=429/87 R leg, 202/81 R lower arm
Patient has AMS, increased respiratory rate and labor of breathing.
pt temp 101
Patient without spontaneous respirations or pulse.
Pt 185/68, , T 99.7F, 96% on 2L NC, RR 20, due for mylotarg

## 2025-01-15 NOTE — PROGRESS NOTE ADULT - PROBLEM SELECTOR PROBLEM 2
Infectious disease
No

## 2025-01-15 NOTE — CONSULT NOTE ADULT - PROBLEM SELECTOR RECOMMENDATION 3
- dc oxy  - start IV dilaudid to 0.2mg q1h PRN MP  - start IV dilaudid to 0.5mg q1h PRN SP  - Bowel regimen while on opioids. Monitor for constipation - dc oxy  - start IV dilaudid to 0.25mg q1h PRN MP  - start IV dilaudid to 0.5mg q1h PRN SP  - Bowel regimen while on opioids. Monitor for constipation

## 2025-01-15 NOTE — CONSULT NOTE ADULT - PROBLEM SELECTOR RECOMMENDATION 5
- HCP in chart naming Aleksandra Cristobal [phone], Secondary Sravan Lazar [phone]  - DNR/DNI, MOLST in chart     In the event of worsening symptoms, please contact the Palliative Medicine team via pager (if the patient is at [facility] #[phone] or if the patient is at [facility] #[phone]) The Geriatric and Palliative Medicine service has coverage 24 hours a day/ 7 days a week to provide medical recommendations regarding symptom management needs via telephone.    Note incomplete until attested by attending. - HCP in chart naming Aleksandra Cristobal [phone], Secondary Sravan Lazar [phone]  - DNR/DNI, MOLST in chart   - Pending PCU bed when available     In the event of worsening symptoms, please contact the Palliative Medicine team via pager (if the patient is at [facility] #[phone] or if the patient is at [facility] #[phone]) The Geriatric and Palliative Medicine service has coverage 24 hours a day/ 7 days a week to provide medical recommendations regarding symptom management needs via telephone.    Note incomplete until attested by attending.

## 2025-01-17 LAB
CULTURE RESULTS: SIGNIFICANT CHANGE UP
SPECIMEN SOURCE: SIGNIFICANT CHANGE UP

## 2025-01-19 LAB
CULTURE RESULTS: SIGNIFICANT CHANGE UP
CULTURE RESULTS: SIGNIFICANT CHANGE UP
SPECIMEN SOURCE: SIGNIFICANT CHANGE UP
SPECIMEN SOURCE: SIGNIFICANT CHANGE UP